# Patient Record
Sex: FEMALE | Race: WHITE | Employment: UNEMPLOYED | URBAN - METROPOLITAN AREA
[De-identification: names, ages, dates, MRNs, and addresses within clinical notes are randomized per-mention and may not be internally consistent; named-entity substitution may affect disease eponyms.]

---

## 2018-09-07 ENCOUNTER — HOSPITAL ENCOUNTER (OUTPATIENT)
Dept: WOMENS IMAGING | Age: 58
Discharge: HOME | End: 2018-09-09
Payer: COMMERCIAL

## 2018-09-07 ENCOUNTER — HOSPITAL ENCOUNTER (OUTPATIENT)
Age: 58
Discharge: HOME | End: 2018-09-07
Payer: COMMERCIAL

## 2018-09-07 DIAGNOSIS — I10 ESSENTIAL HYPERTENSION: ICD-10-CM

## 2018-09-07 DIAGNOSIS — Z12.31 BREAST CANCER SCREENING BY MAMMOGRAM: ICD-10-CM

## 2018-09-07 DIAGNOSIS — Z80.3 FAMILY HISTORY OF BREAST CANCER: ICD-10-CM

## 2018-09-07 LAB
CHOLESTEROL/HDL RATIO: 6.7
CHOLESTEROL: 329 MG/DL
HDLC SERPL-MCNC: 49 MG/DL
LDL CHOLESTEROL: 236 MG/DL (ref 0–130)
TRIGL SERPL-MCNC: 222 MG/DL
VLDLC SERPL CALC-MCNC: ABNORMAL MG/DL (ref 1–30)

## 2018-09-07 PROCEDURE — 80061 LIPID PANEL: CPT

## 2018-09-07 PROCEDURE — 36415 COLL VENOUS BLD VENIPUNCTURE: CPT

## 2018-09-07 PROCEDURE — 77063 BREAST TOMOSYNTHESIS BI: CPT

## 2018-09-14 PROBLEM — Z80.3 FAMILY HISTORY OF BREAST CANCER: Status: ACTIVE | Noted: 2018-09-14

## 2018-09-14 PROBLEM — F41.9 ANXIETY: Status: ACTIVE | Noted: 2018-09-14

## 2018-09-14 PROBLEM — I10 ESSENTIAL HYPERTENSION: Status: ACTIVE | Noted: 2018-09-14

## 2018-09-14 PROBLEM — F33.1 MODERATE EPISODE OF RECURRENT MAJOR DEPRESSIVE DISORDER (HCC): Status: ACTIVE | Noted: 2018-09-14

## 2018-09-14 PROBLEM — E78.2 MIXED HYPERLIPIDEMIA: Status: ACTIVE | Noted: 2018-09-14

## 2018-11-15 ENCOUNTER — TELEPHONE (OUTPATIENT)
Dept: GASTROENTEROLOGY | Age: 58
End: 2018-11-15

## 2019-02-27 PROBLEM — K21.9 GASTROESOPHAGEAL REFLUX DISEASE WITHOUT ESOPHAGITIS: Status: ACTIVE | Noted: 2019-02-27

## 2019-03-27 ENCOUNTER — HOSPITAL ENCOUNTER (OUTPATIENT)
Age: 59
Setting detail: SPECIMEN
Discharge: HOME | End: 2019-03-27
Payer: COMMERCIAL

## 2019-03-27 DIAGNOSIS — F33.1 MODERATE EPISODE OF RECURRENT MAJOR DEPRESSIVE DISORDER (HCC): ICD-10-CM

## 2019-03-27 DIAGNOSIS — K21.9 GASTROESOPHAGEAL REFLUX DISEASE WITHOUT ESOPHAGITIS: ICD-10-CM

## 2019-03-27 DIAGNOSIS — E78.2 MIXED HYPERLIPIDEMIA: ICD-10-CM

## 2019-03-27 DIAGNOSIS — E55.9 VITAMIN D INSUFFICIENCY: ICD-10-CM

## 2019-03-27 DIAGNOSIS — F41.9 ANXIETY: ICD-10-CM

## 2019-03-27 DIAGNOSIS — R13.10 DYSPHAGIA, UNSPECIFIED TYPE: ICD-10-CM

## 2019-03-27 DIAGNOSIS — I10 ESSENTIAL HYPERTENSION: ICD-10-CM

## 2019-03-27 LAB
ABSOLUTE EOS #: 0.23 K/UL (ref 0–0.44)
ABSOLUTE IMMATURE GRANULOCYTE: <0.03 K/UL (ref 0–0.3)
ABSOLUTE LYMPH #: 2.65 K/UL (ref 1.1–3.7)
ABSOLUTE MONO #: 0.45 K/UL (ref 0.1–1.2)
ALBUMIN SERPL-MCNC: 4.7 G/DL (ref 3.5–5.2)
ALBUMIN/GLOBULIN RATIO: 1.8 (ref 1–2.5)
ALP BLD-CCNC: 74 U/L (ref 35–104)
ALT SERPL-CCNC: 22 U/L (ref 5–33)
ANION GAP SERPL CALCULATED.3IONS-SCNC: 16 MMOL/L (ref 9–17)
AST SERPL-CCNC: 14 U/L
BASOPHILS # BLD: 1 % (ref 0–2)
BASOPHILS ABSOLUTE: 0.05 K/UL (ref 0–0.2)
BILIRUB SERPL-MCNC: 0.26 MG/DL (ref 0.3–1.2)
BUN BLDV-MCNC: 22 MG/DL (ref 8–23)
BUN/CREAT BLD: ABNORMAL (ref 9–20)
CALCIUM SERPL-MCNC: 10.3 MG/DL (ref 8.6–10.4)
CHLORIDE BLD-SCNC: 96 MMOL/L (ref 98–107)
CHOLESTEROL/HDL RATIO: 6
CHOLESTEROL: 251 MG/DL
CO2: 23 MMOL/L (ref 20–31)
CREAT SERPL-MCNC: 1.21 MG/DL (ref 0.5–0.9)
DIFFERENTIAL TYPE: NORMAL
EOSINOPHILS RELATIVE PERCENT: 4 % (ref 1–4)
GFR AFRICAN AMERICAN: 55 ML/MIN
GFR NON-AFRICAN AMERICAN: 45 ML/MIN
GFR SERPL CREATININE-BSD FRML MDRD: ABNORMAL ML/MIN/{1.73_M2}
GFR SERPL CREATININE-BSD FRML MDRD: ABNORMAL ML/MIN/{1.73_M2}
GLUCOSE BLD-MCNC: 91 MG/DL (ref 70–99)
HCT VFR BLD CALC: 40.7 % (ref 36.3–47.1)
HDLC SERPL-MCNC: 42 MG/DL
HEMOGLOBIN: 13.3 G/DL (ref 11.9–15.1)
IMMATURE GRANULOCYTES: 0 %
LDL CHOLESTEROL: 131 MG/DL (ref 0–130)
LYMPHOCYTES # BLD: 40 % (ref 24–43)
MCH RBC QN AUTO: 30.5 PG (ref 25.2–33.5)
MCHC RBC AUTO-ENTMCNC: 32.7 G/DL (ref 28.4–34.8)
MCV RBC AUTO: 93.3 FL (ref 82.6–102.9)
MONOCYTES # BLD: 7 % (ref 3–12)
NRBC AUTOMATED: 0 PER 100 WBC
PDW BLD-RTO: 12.6 % (ref 11.8–14.4)
PLATELET # BLD: 374 K/UL (ref 138–453)
PLATELET ESTIMATE: NORMAL
PMV BLD AUTO: 9.9 FL (ref 8.1–13.5)
POTASSIUM SERPL-SCNC: 4.3 MMOL/L (ref 3.7–5.3)
RBC # BLD: 4.36 M/UL (ref 3.95–5.11)
RBC # BLD: NORMAL 10*6/UL
SEG NEUTROPHILS: 48 % (ref 36–65)
SEGMENTED NEUTROPHILS ABSOLUTE COUNT: 3.17 K/UL (ref 1.5–8.1)
SODIUM BLD-SCNC: 135 MMOL/L (ref 135–144)
THYROXINE, FREE: 1.05 NG/DL (ref 0.93–1.7)
TOTAL PROTEIN: 7.3 G/DL (ref 6.4–8.3)
TRIGL SERPL-MCNC: 392 MG/DL
TSH SERPL DL<=0.05 MIU/L-ACNC: 1.23 MIU/L (ref 0.3–5)
VITAMIN D 25-HYDROXY: 23.6 NG/ML (ref 30–100)
VLDLC SERPL CALC-MCNC: ABNORMAL MG/DL (ref 1–30)
WBC # BLD: 6.6 K/UL (ref 3.5–11.3)
WBC # BLD: NORMAL 10*3/UL

## 2019-04-24 ENCOUNTER — TELEPHONE (OUTPATIENT)
Dept: GASTROENTEROLOGY | Age: 59
End: 2019-04-24

## 2019-04-24 NOTE — TELEPHONE ENCOUNTER
Patient LVM to schedule colon/egd she has referrals for colon screening and for dysphagia. Patient was contacted last year to schedule colon but it was not scheduled. Due to the new dx of dysphagia, patient will need an office visit. Please contact patient to schedule consult.

## 2019-04-29 NOTE — TELEPHONE ENCOUNTER
Tried calling pt back to schedule and phone # on file is restricted and will not take our call.  - 1 attempt on this call

## 2019-10-29 ENCOUNTER — CARE COORDINATION (OUTPATIENT)
Dept: CARE COORDINATION | Age: 59
End: 2019-10-29

## 2019-11-08 ENCOUNTER — CARE COORDINATION (OUTPATIENT)
Dept: CARE COORDINATION | Age: 59
End: 2019-11-08

## 2019-11-12 ENCOUNTER — HOSPITAL ENCOUNTER (OUTPATIENT)
Age: 59
Setting detail: SPECIMEN
Discharge: HOME | End: 2019-11-12
Payer: COMMERCIAL

## 2019-11-12 DIAGNOSIS — E55.9 VITAMIN D INSUFFICIENCY: ICD-10-CM

## 2019-11-12 DIAGNOSIS — I10 ESSENTIAL HYPERTENSION: ICD-10-CM

## 2019-11-12 DIAGNOSIS — F33.1 MODERATE EPISODE OF RECURRENT MAJOR DEPRESSIVE DISORDER (HCC): ICD-10-CM

## 2019-11-12 DIAGNOSIS — K21.9 GASTROESOPHAGEAL REFLUX DISEASE WITHOUT ESOPHAGITIS: ICD-10-CM

## 2019-11-12 DIAGNOSIS — E78.2 MIXED HYPERLIPIDEMIA: ICD-10-CM

## 2019-11-12 DIAGNOSIS — Z11.4 ENCOUNTER FOR SCREENING FOR HIV: ICD-10-CM

## 2019-11-12 DIAGNOSIS — Z11.59 ENCOUNTER FOR HEPATITIS C SCREENING TEST FOR LOW RISK PATIENT: ICD-10-CM

## 2019-11-12 DIAGNOSIS — F41.9 ANXIETY: ICD-10-CM

## 2019-11-12 LAB
ABSOLUTE EOS #: 0.22 K/UL (ref 0–0.44)
ABSOLUTE IMMATURE GRANULOCYTE: <0.03 K/UL (ref 0–0.3)
ABSOLUTE LYMPH #: 2.71 K/UL (ref 1.1–3.7)
ABSOLUTE MONO #: 0.52 K/UL (ref 0.1–1.2)
ALBUMIN SERPL-MCNC: 4.3 G/DL (ref 3.5–5.2)
ALBUMIN/GLOBULIN RATIO: 1.7 (ref 1–2.5)
ALP BLD-CCNC: 71 U/L (ref 35–104)
ALT SERPL-CCNC: 16 U/L (ref 5–33)
ANION GAP SERPL CALCULATED.3IONS-SCNC: 15 MMOL/L (ref 9–17)
AST SERPL-CCNC: 12 U/L
BASOPHILS # BLD: 1 % (ref 0–2)
BASOPHILS ABSOLUTE: 0.06 K/UL (ref 0–0.2)
BILIRUB SERPL-MCNC: 0.2 MG/DL (ref 0.3–1.2)
BUN BLDV-MCNC: 18 MG/DL (ref 8–23)
BUN/CREAT BLD: ABNORMAL (ref 9–20)
CALCIUM SERPL-MCNC: 9.8 MG/DL (ref 8.6–10.4)
CHLORIDE BLD-SCNC: 103 MMOL/L (ref 98–107)
CHOLESTEROL/HDL RATIO: 5.5
CHOLESTEROL: 227 MG/DL
CO2: 24 MMOL/L (ref 20–31)
CREAT SERPL-MCNC: 0.89 MG/DL (ref 0.5–0.9)
DIFFERENTIAL TYPE: NORMAL
EOSINOPHILS RELATIVE PERCENT: 3 % (ref 1–4)
GFR AFRICAN AMERICAN: >60 ML/MIN
GFR NON-AFRICAN AMERICAN: >60 ML/MIN
GFR SERPL CREATININE-BSD FRML MDRD: ABNORMAL ML/MIN/{1.73_M2}
GFR SERPL CREATININE-BSD FRML MDRD: ABNORMAL ML/MIN/{1.73_M2}
GLUCOSE BLD-MCNC: 97 MG/DL (ref 70–99)
HCT VFR BLD CALC: 37.1 % (ref 36.3–47.1)
HDLC SERPL-MCNC: 41 MG/DL
HEMOGLOBIN: 12.8 G/DL (ref 11.9–15.1)
HEPATITIS C ANTIBODY: NONREACTIVE
HIV AG/AB: NONREACTIVE
IMMATURE GRANULOCYTES: 0 %
LDL CHOLESTEROL: 107 MG/DL (ref 0–130)
LYMPHOCYTES # BLD: 40 % (ref 24–43)
MCH RBC QN AUTO: 31.1 PG (ref 25.2–33.5)
MCHC RBC AUTO-ENTMCNC: 34.5 G/DL (ref 28.4–34.8)
MCV RBC AUTO: 90.3 FL (ref 82.6–102.9)
MONOCYTES # BLD: 8 % (ref 3–12)
NRBC AUTOMATED: 0 PER 100 WBC
PDW BLD-RTO: 13.4 % (ref 11.8–14.4)
PLATELET # BLD: 364 K/UL (ref 138–453)
PLATELET ESTIMATE: NORMAL
PMV BLD AUTO: 10.1 FL (ref 8.1–13.5)
POTASSIUM SERPL-SCNC: 3.9 MMOL/L (ref 3.7–5.3)
RBC # BLD: 4.11 M/UL (ref 3.95–5.11)
RBC # BLD: NORMAL 10*6/UL
SEG NEUTROPHILS: 48 % (ref 36–65)
SEGMENTED NEUTROPHILS ABSOLUTE COUNT: 3.26 K/UL (ref 1.5–8.1)
SODIUM BLD-SCNC: 142 MMOL/L (ref 135–144)
TOTAL PROTEIN: 6.9 G/DL (ref 6.4–8.3)
TRIGL SERPL-MCNC: 396 MG/DL
VITAMIN D 25-HYDROXY: 24.7 NG/ML (ref 30–100)
VLDLC SERPL CALC-MCNC: ABNORMAL MG/DL (ref 1–30)
WBC # BLD: 6.8 K/UL (ref 3.5–11.3)
WBC # BLD: NORMAL 10*3/UL

## 2019-12-06 ENCOUNTER — HOSPITAL ENCOUNTER (OUTPATIENT)
Dept: WOMENS IMAGING | Age: 59
Discharge: HOME | End: 2019-12-08
Payer: COMMERCIAL

## 2019-12-06 DIAGNOSIS — Z12.31 ENCOUNTER FOR SCREENING MAMMOGRAM FOR BREAST CANCER: ICD-10-CM

## 2019-12-06 PROCEDURE — 77063 BREAST TOMOSYNTHESIS BI: CPT

## 2019-12-11 ENCOUNTER — HOSPITAL ENCOUNTER (OUTPATIENT)
Dept: WOMENS IMAGING | Age: 59
Discharge: HOME | End: 2019-12-13
Payer: COMMERCIAL

## 2019-12-11 DIAGNOSIS — N64.89 BREAST ASYMMETRY IN FEMALE: ICD-10-CM

## 2019-12-11 PROCEDURE — 76642 ULTRASOUND BREAST LIMITED: CPT

## 2019-12-11 PROCEDURE — G0279 TOMOSYNTHESIS, MAMMO: HCPCS

## 2020-06-11 ENCOUNTER — HOSPITAL ENCOUNTER (OUTPATIENT)
Age: 60
Setting detail: SPECIMEN
Discharge: HOME | End: 2020-06-11
Payer: COMMERCIAL

## 2020-06-11 LAB
ABSOLUTE EOS #: 0.22 K/UL (ref 0–0.44)
ABSOLUTE IMMATURE GRANULOCYTE: 0.03 K/UL (ref 0–0.3)
ABSOLUTE LYMPH #: 1.85 K/UL (ref 1.1–3.7)
ABSOLUTE MONO #: 0.42 K/UL (ref 0.1–1.2)
ALBUMIN SERPL-MCNC: 4.6 G/DL (ref 3.5–5.2)
ALBUMIN/GLOBULIN RATIO: 2.1 (ref 1–2.5)
ALP BLD-CCNC: 31 U/L (ref 35–104)
ALT SERPL-CCNC: 49 U/L (ref 5–33)
ANION GAP SERPL CALCULATED.3IONS-SCNC: 16 MMOL/L (ref 9–17)
AST SERPL-CCNC: 37 U/L
BASOPHILS # BLD: 1 % (ref 0–2)
BASOPHILS ABSOLUTE: 0.05 K/UL (ref 0–0.2)
BILIRUB SERPL-MCNC: 0.19 MG/DL (ref 0.3–1.2)
BUN BLDV-MCNC: 24 MG/DL (ref 8–23)
BUN/CREAT BLD: ABNORMAL (ref 9–20)
CALCIUM SERPL-MCNC: 9.7 MG/DL (ref 8.6–10.4)
CHLORIDE BLD-SCNC: 100 MMOL/L (ref 98–107)
CHOLESTEROL/HDL RATIO: 5.1
CHOLESTEROL: 200 MG/DL
CO2: 22 MMOL/L (ref 20–31)
CREAT SERPL-MCNC: 1.23 MG/DL (ref 0.5–0.9)
DIFFERENTIAL TYPE: ABNORMAL
EOSINOPHILS RELATIVE PERCENT: 4 % (ref 1–4)
GFR AFRICAN AMERICAN: 54 ML/MIN
GFR NON-AFRICAN AMERICAN: 44 ML/MIN
GFR SERPL CREATININE-BSD FRML MDRD: ABNORMAL ML/MIN/{1.73_M2}
GFR SERPL CREATININE-BSD FRML MDRD: ABNORMAL ML/MIN/{1.73_M2}
GLUCOSE BLD-MCNC: 105 MG/DL (ref 70–99)
HCT VFR BLD CALC: 37 % (ref 36.3–47.1)
HDLC SERPL-MCNC: 39 MG/DL
HEMOGLOBIN: 11.7 G/DL (ref 11.9–15.1)
IMMATURE GRANULOCYTES: 1 %
LDL CHOLESTEROL: 101 MG/DL (ref 0–130)
LYMPHOCYTES # BLD: 32 % (ref 24–43)
MCH RBC QN AUTO: 30 PG (ref 25.2–33.5)
MCHC RBC AUTO-ENTMCNC: 31.6 G/DL (ref 28.4–34.8)
MCV RBC AUTO: 94.9 FL (ref 82.6–102.9)
MONOCYTES # BLD: 7 % (ref 3–12)
NRBC AUTOMATED: 0 PER 100 WBC
PDW BLD-RTO: 12.8 % (ref 11.8–14.4)
PLATELET # BLD: 389 K/UL (ref 138–453)
PLATELET ESTIMATE: ABNORMAL
PMV BLD AUTO: 10.3 FL (ref 8.1–13.5)
POTASSIUM SERPL-SCNC: 4.5 MMOL/L (ref 3.7–5.3)
RBC # BLD: 3.9 M/UL (ref 3.95–5.11)
RBC # BLD: ABNORMAL 10*6/UL
SEG NEUTROPHILS: 55 % (ref 36–65)
SEGMENTED NEUTROPHILS ABSOLUTE COUNT: 3.29 K/UL (ref 1.5–8.1)
SODIUM BLD-SCNC: 138 MMOL/L (ref 135–144)
TOTAL PROTEIN: 6.8 G/DL (ref 6.4–8.3)
TRIGL SERPL-MCNC: 298 MG/DL
TSH SERPL DL<=0.05 MIU/L-ACNC: 1.19 MIU/L (ref 0.3–5)
VITAMIN D 25-HYDROXY: 21.3 NG/ML (ref 30–100)
VLDLC SERPL CALC-MCNC: ABNORMAL MG/DL (ref 1–30)
WBC # BLD: 5.9 K/UL (ref 3.5–11.3)
WBC # BLD: ABNORMAL 10*3/UL

## 2021-07-15 ENCOUNTER — HOSPITAL ENCOUNTER (OUTPATIENT)
Dept: WOMENS IMAGING | Age: 61
Discharge: HOME | End: 2021-07-17
Payer: COMMERCIAL

## 2021-07-15 DIAGNOSIS — Z12.31 ENCOUNTER FOR SCREENING MAMMOGRAM FOR BREAST CANCER: ICD-10-CM

## 2021-07-15 PROCEDURE — 77063 BREAST TOMOSYNTHESIS BI: CPT

## 2021-07-21 ENCOUNTER — HOSPITAL ENCOUNTER (OUTPATIENT)
Age: 61
Setting detail: SPECIMEN
Discharge: HOME | End: 2021-07-21
Payer: COMMERCIAL

## 2021-07-21 DIAGNOSIS — E55.9 VITAMIN D INSUFFICIENCY: ICD-10-CM

## 2021-07-21 DIAGNOSIS — R73.01 ELEVATED FASTING GLUCOSE: ICD-10-CM

## 2021-07-21 DIAGNOSIS — F41.9 ANXIETY: ICD-10-CM

## 2021-07-21 DIAGNOSIS — I10 ESSENTIAL HYPERTENSION: ICD-10-CM

## 2021-07-21 DIAGNOSIS — K21.9 GASTROESOPHAGEAL REFLUX DISEASE WITHOUT ESOPHAGITIS: ICD-10-CM

## 2021-07-21 DIAGNOSIS — E78.2 MIXED HYPERLIPIDEMIA: ICD-10-CM

## 2021-07-21 DIAGNOSIS — F33.1 MODERATE EPISODE OF RECURRENT MAJOR DEPRESSIVE DISORDER (HCC): ICD-10-CM

## 2021-07-21 DIAGNOSIS — Z13.1 ENCOUNTER FOR SCREENING FOR DIABETES MELLITUS: ICD-10-CM

## 2021-07-21 DIAGNOSIS — F17.200 NICOTINE DEPENDENCE WITH CURRENT USE: ICD-10-CM

## 2021-07-21 LAB
ABSOLUTE EOS #: 0.19 K/UL (ref 0–0.44)
ABSOLUTE IMMATURE GRANULOCYTE: 0.03 K/UL (ref 0–0.3)
ABSOLUTE LYMPH #: 2.15 K/UL (ref 1.1–3.7)
ABSOLUTE MONO #: 0.55 K/UL (ref 0.1–1.2)
ALBUMIN SERPL-MCNC: 4.9 G/DL (ref 3.5–5.2)
ALBUMIN/GLOBULIN RATIO: 1.8 (ref 1–2.5)
ALP BLD-CCNC: 33 U/L (ref 35–104)
ALT SERPL-CCNC: 30 U/L (ref 5–33)
ANION GAP SERPL CALCULATED.3IONS-SCNC: 19 MMOL/L (ref 9–17)
AST SERPL-CCNC: 26 U/L
BASOPHILS # BLD: 1 % (ref 0–2)
BASOPHILS ABSOLUTE: 0.05 K/UL (ref 0–0.2)
BILIRUB SERPL-MCNC: 0.28 MG/DL (ref 0.3–1.2)
BUN BLDV-MCNC: 27 MG/DL (ref 8–23)
BUN/CREAT BLD: ABNORMAL (ref 9–20)
CALCIUM SERPL-MCNC: 10.6 MG/DL (ref 8.6–10.4)
CHLORIDE BLD-SCNC: 104 MMOL/L (ref 98–107)
CHOLESTEROL, FASTING: 190 MG/DL
CHOLESTEROL/HDL RATIO: 3.9
CO2: 20 MMOL/L (ref 20–31)
CREAT SERPL-MCNC: 1.3 MG/DL (ref 0.5–0.9)
DIFFERENTIAL TYPE: NORMAL
EOSINOPHILS RELATIVE PERCENT: 3 % (ref 1–4)
GFR AFRICAN AMERICAN: 50 ML/MIN
GFR NON-AFRICAN AMERICAN: 42 ML/MIN
GFR SERPL CREATININE-BSD FRML MDRD: ABNORMAL ML/MIN/{1.73_M2}
GFR SERPL CREATININE-BSD FRML MDRD: ABNORMAL ML/MIN/{1.73_M2}
GLUCOSE BLD-MCNC: 90 MG/DL (ref 70–99)
HCT VFR BLD CALC: 41.3 % (ref 36.3–47.1)
HDLC SERPL-MCNC: 49 MG/DL
HEMOGLOBIN: 12.8 G/DL (ref 11.9–15.1)
IMMATURE GRANULOCYTES: 0 %
LDL CHOLESTEROL: 97 MG/DL (ref 0–130)
LYMPHOCYTES # BLD: 30 % (ref 24–43)
MCH RBC QN AUTO: 29.6 PG (ref 25.2–33.5)
MCHC RBC AUTO-ENTMCNC: 31 G/DL (ref 28.4–34.8)
MCV RBC AUTO: 95.6 FL (ref 82.6–102.9)
MONOCYTES # BLD: 8 % (ref 3–12)
NRBC AUTOMATED: 0 PER 100 WBC
PDW BLD-RTO: 13.3 % (ref 11.8–14.4)
PLATELET # BLD: 438 K/UL (ref 138–453)
PLATELET ESTIMATE: NORMAL
PMV BLD AUTO: 10.4 FL (ref 8.1–13.5)
POTASSIUM SERPL-SCNC: 4.5 MMOL/L (ref 3.7–5.3)
RBC # BLD: 4.32 M/UL (ref 3.95–5.11)
RBC # BLD: NORMAL 10*6/UL
SEG NEUTROPHILS: 58 % (ref 36–65)
SEGMENTED NEUTROPHILS ABSOLUTE COUNT: 4.14 K/UL (ref 1.5–8.1)
SODIUM BLD-SCNC: 143 MMOL/L (ref 135–144)
TOTAL PROTEIN: 7.6 G/DL (ref 6.4–8.3)
TRIGLYCERIDE, FASTING: 219 MG/DL
VLDLC SERPL CALC-MCNC: ABNORMAL MG/DL (ref 1–30)
WBC # BLD: 7.1 K/UL (ref 3.5–11.3)
WBC # BLD: NORMAL 10*3/UL

## 2021-07-22 LAB
ESTIMATED AVERAGE GLUCOSE: 128 MG/DL
HBA1C MFR BLD: 6.1 % (ref 4–6)
VITAMIN D 25-HYDROXY: 36.9 NG/ML (ref 30–100)

## 2021-07-28 ENCOUNTER — HOSPITAL ENCOUNTER (OUTPATIENT)
Dept: ULTRASOUND IMAGING | Age: 61
Discharge: HOME | End: 2021-07-30
Payer: COMMERCIAL

## 2021-07-28 DIAGNOSIS — R79.9 ELEVATED BUN: ICD-10-CM

## 2021-07-28 DIAGNOSIS — R79.89 ELEVATED SERUM CREATININE: ICD-10-CM

## 2021-07-28 DIAGNOSIS — R94.4 DECREASED GFR: ICD-10-CM

## 2021-07-28 PROCEDURE — 76770 US EXAM ABDO BACK WALL COMP: CPT

## 2021-09-07 ENCOUNTER — HOSPITAL ENCOUNTER (OUTPATIENT)
Age: 61
Setting detail: SPECIMEN
Discharge: HOME | End: 2021-09-07
Payer: COMMERCIAL

## 2021-09-07 DIAGNOSIS — I12.9 BENIGN HYPERTENSION WITH CKD (CHRONIC KIDNEY DISEASE) STAGE III (HCC): ICD-10-CM

## 2021-09-07 DIAGNOSIS — N18.32 STAGE 3B CHRONIC KIDNEY DISEASE (HCC): ICD-10-CM

## 2021-09-07 DIAGNOSIS — N18.30 BENIGN HYPERTENSION WITH CKD (CHRONIC KIDNEY DISEASE) STAGE III (HCC): ICD-10-CM

## 2021-09-07 DIAGNOSIS — N18.32 ANEMIA IN STAGE 3B CHRONIC KIDNEY DISEASE (HCC): ICD-10-CM

## 2021-09-07 DIAGNOSIS — E83.52 HYPERCALCEMIA: ICD-10-CM

## 2021-09-07 DIAGNOSIS — D63.1 ANEMIA IN STAGE 3B CHRONIC KIDNEY DISEASE (HCC): ICD-10-CM

## 2021-09-07 LAB
-: NORMAL
ABSOLUTE EOS #: 0.25 K/UL (ref 0–0.44)
ABSOLUTE IMMATURE GRANULOCYTE: 0.03 K/UL (ref 0–0.3)
ABSOLUTE LYMPH #: 2.26 K/UL (ref 1.1–3.7)
ABSOLUTE MONO #: 0.56 K/UL (ref 0.1–1.2)
AMORPHOUS: NORMAL
ANION GAP SERPL CALCULATED.3IONS-SCNC: 16 MMOL/L (ref 9–17)
BACTERIA: NORMAL
BASOPHILS # BLD: 1 % (ref 0–2)
BASOPHILS ABSOLUTE: 0.06 K/UL (ref 0–0.2)
BILIRUBIN URINE: NEGATIVE
BUN BLDV-MCNC: 32 MG/DL (ref 8–23)
CALCIUM SERPL-MCNC: 10.4 MG/DL (ref 8.6–10.4)
CASTS UA: NORMAL /LPF (ref 0–8)
CHLORIDE BLD-SCNC: 103 MMOL/L (ref 98–107)
CO2: 20 MMOL/L (ref 20–31)
COLOR: YELLOW
CREAT SERPL-MCNC: 1.25 MG/DL (ref 0.5–0.9)
CREATININE URINE: 46.5 MG/DL (ref 28–217)
CRYSTALS, UA: NORMAL /HPF
DIFFERENTIAL TYPE: NORMAL
EOSINOPHILS RELATIVE PERCENT: 4 % (ref 1–4)
EPITHELIAL CELLS UA: NORMAL /HPF (ref 0–5)
GFR AFRICAN AMERICAN: 53 ML/MIN
GFR NON-AFRICAN AMERICAN: 43 ML/MIN
GFR SERPL CREATININE-BSD FRML MDRD: ABNORMAL ML/MIN/{1.73_M2}
GFR SERPL CREATININE-BSD FRML MDRD: ABNORMAL ML/MIN/{1.73_M2}
GLUCOSE URINE: NEGATIVE
HCT VFR BLD CALC: 38.4 % (ref 36.3–47.1)
HEMOGLOBIN: 12.3 G/DL (ref 11.9–15.1)
IMMATURE GRANULOCYTES: 0 %
KETONES, URINE: NEGATIVE
LEUKOCYTE ESTERASE, URINE: NEGATIVE
LYMPHOCYTES # BLD: 31 % (ref 24–43)
MAGNESIUM: 2 MG/DL (ref 1.6–2.6)
MCH RBC QN AUTO: 29.1 PG (ref 25.2–33.5)
MCHC RBC AUTO-ENTMCNC: 32 G/DL (ref 28.4–34.8)
MCV RBC AUTO: 91 FL (ref 82.6–102.9)
MICROALBUMIN/CREAT 24H UR: <12 MG/L
MICROALBUMIN/CREAT UR-RTO: NORMAL MCG/MG CREAT
MONOCYTES # BLD: 8 % (ref 3–12)
MUCUS: NORMAL
NITRITE, URINE: NEGATIVE
NRBC AUTOMATED: 0 PER 100 WBC
OTHER OBSERVATIONS UA: NORMAL
PDW BLD-RTO: 13 % (ref 11.8–14.4)
PH UA: 6.5 (ref 5–8)
PHOSPHORUS: 3.2 MG/DL (ref 2.6–4.5)
PLATELET # BLD: 445 K/UL (ref 138–453)
PLATELET ESTIMATE: NORMAL
PMV BLD AUTO: 10.4 FL (ref 8.1–13.5)
POTASSIUM SERPL-SCNC: 4.2 MMOL/L (ref 3.7–5.3)
PROTEIN UA: NEGATIVE
RBC # BLD: 4.22 M/UL (ref 3.95–5.11)
RBC # BLD: NORMAL 10*6/UL
RBC UA: NORMAL /HPF (ref 0–4)
RENAL EPITHELIAL, UA: NORMAL /HPF
SEG NEUTROPHILS: 56 % (ref 36–65)
SEGMENTED NEUTROPHILS ABSOLUTE COUNT: 4.03 K/UL (ref 1.5–8.1)
SODIUM BLD-SCNC: 139 MMOL/L (ref 135–144)
SPECIFIC GRAVITY UA: 1.01 (ref 1–1.03)
TRICHOMONAS: NORMAL
TURBIDITY: CLEAR
URINE HGB: NEGATIVE
UROBILINOGEN, URINE: NORMAL
VITAMIN D 25-HYDROXY: 33.7 NG/ML (ref 30–100)
WBC # BLD: 7.2 K/UL (ref 3.5–11.3)
WBC # BLD: NORMAL 10*3/UL
WBC UA: NORMAL /HPF (ref 0–5)
YEAST: NORMAL

## 2021-10-25 ENCOUNTER — HOSPITAL ENCOUNTER (OUTPATIENT)
Age: 61
Setting detail: SPECIMEN
Discharge: HOME | End: 2021-10-25
Payer: COMMERCIAL

## 2021-10-25 DIAGNOSIS — I12.9 BENIGN HYPERTENSION WITH CKD (CHRONIC KIDNEY DISEASE) STAGE III (HCC): ICD-10-CM

## 2021-10-25 DIAGNOSIS — N18.32 STAGE 3B CHRONIC KIDNEY DISEASE (HCC): ICD-10-CM

## 2021-10-25 DIAGNOSIS — N18.30 BENIGN HYPERTENSION WITH CKD (CHRONIC KIDNEY DISEASE) STAGE III (HCC): ICD-10-CM

## 2021-10-25 LAB
CALCIUM TIMED UR: ABNORMAL MG/X H
CALCIUM URINE: 16.3 MG/DL
CALCIUM, URINE: 345 MG/24 H (ref 20–275)
CREAT SERPL-MCNC: 1.38 MG/DL (ref 0.5–0.9)
CREATININE CLEARANCE: 55.2 ML/MIN/BSA (ref 71–151)
CREATININE URINE: 54.7 MG/DL (ref 28–217)
HOURS COLLECTED: 24 H
LENGTH OF COLLECTION: 24 H
PATIENT HEIGHT: 163 CM
PATIENT WEIGHT: 77 KG
PROTEIN 24 HOUR URINE: <85 MG/24 H
PROTEIN,TOT TIMED UR: NORMAL MG/X H
SODIUM 24 HOUR URINE: 140 MMOL/24 H (ref 40–220)
SODIUM TIMED UR: NORMAL MMOL/X H
SODIUM URINE: 66 MMOL/L
URINE TOTAL PROTEIN: <4 MG/DL
VOLUME: 2117 ML

## 2021-11-05 PROBLEM — M65.30 ACQUIRED TRIGGER FINGER: Status: ACTIVE | Noted: 2021-08-19

## 2022-07-21 ENCOUNTER — HOSPITAL ENCOUNTER (OUTPATIENT)
Age: 62
Setting detail: SPECIMEN
Discharge: HOME | End: 2022-07-21

## 2022-07-21 ENCOUNTER — OFFICE VISIT (OUTPATIENT)
Dept: GASTROENTEROLOGY | Age: 62
End: 2022-07-21
Payer: COMMERCIAL

## 2022-07-21 VITALS
BODY MASS INDEX: 29.91 KG/M2 | HEIGHT: 64 IN | HEART RATE: 76 BPM | DIASTOLIC BLOOD PRESSURE: 82 MMHG | SYSTOLIC BLOOD PRESSURE: 138 MMHG | WEIGHT: 175.2 LBS

## 2022-07-21 DIAGNOSIS — K21.9 GASTROESOPHAGEAL REFLUX DISEASE, UNSPECIFIED WHETHER ESOPHAGITIS PRESENT: ICD-10-CM

## 2022-07-21 DIAGNOSIS — K21.9 GASTROESOPHAGEAL REFLUX DISEASE, UNSPECIFIED WHETHER ESOPHAGITIS PRESENT: Primary | ICD-10-CM

## 2022-07-21 LAB
ABSOLUTE EOS #: 0.12 K/UL (ref 0–0.44)
ABSOLUTE IMMATURE GRANULOCYTE: 0.03 K/UL (ref 0–0.3)
ABSOLUTE LYMPH #: 2.66 K/UL (ref 1.1–3.7)
ABSOLUTE MONO #: 0.73 K/UL (ref 0.1–1.2)
ALBUMIN SERPL-MCNC: 5.1 G/DL (ref 3.5–5.2)
ALBUMIN/GLOBULIN RATIO: 2.1 (ref 1–2.5)
ALP BLD-CCNC: 42 U/L (ref 35–104)
ALT SERPL-CCNC: 31 U/L (ref 5–33)
AST SERPL-CCNC: 34 U/L
BASOPHILS # BLD: 1 % (ref 0–2)
BASOPHILS ABSOLUTE: 0.09 K/UL (ref 0–0.2)
BILIRUB SERPL-MCNC: 0.25 MG/DL (ref 0.3–1.2)
BILIRUBIN DIRECT: <0.08 MG/DL
BILIRUBIN, INDIRECT: ABNORMAL MG/DL (ref 0–1)
EOSINOPHILS RELATIVE PERCENT: 1 % (ref 1–4)
HCT VFR BLD CALC: 42.5 % (ref 36.3–47.1)
HEMOGLOBIN: 13.7 G/DL (ref 11.9–15.1)
IMMATURE GRANULOCYTES: 0 %
LYMPHOCYTES # BLD: 30 % (ref 24–43)
MCH RBC QN AUTO: 29.5 PG (ref 25.2–33.5)
MCHC RBC AUTO-ENTMCNC: 32.2 G/DL (ref 28.4–34.8)
MCV RBC AUTO: 91.6 FL (ref 82.6–102.9)
MONOCYTES # BLD: 8 % (ref 3–12)
NRBC AUTOMATED: 0 PER 100 WBC
PDW BLD-RTO: 12.8 % (ref 11.8–14.4)
PLATELET # BLD: 430 K/UL (ref 138–453)
PMV BLD AUTO: 10.4 FL (ref 8.1–13.5)
RBC # BLD: 4.64 M/UL (ref 3.95–5.11)
SEG NEUTROPHILS: 60 % (ref 36–65)
SEGMENTED NEUTROPHILS ABSOLUTE COUNT: 5.31 K/UL (ref 1.5–8.1)
TOTAL PROTEIN: 7.5 G/DL (ref 6.4–8.3)
WBC # BLD: 8.9 K/UL (ref 3.5–11.3)

## 2022-07-21 PROCEDURE — 3017F COLORECTAL CA SCREEN DOC REV: CPT | Performed by: INTERNAL MEDICINE

## 2022-07-21 PROCEDURE — 99203 OFFICE O/P NEW LOW 30 MIN: CPT | Performed by: INTERNAL MEDICINE

## 2022-07-21 PROCEDURE — G8417 CALC BMI ABV UP PARAM F/U: HCPCS | Performed by: INTERNAL MEDICINE

## 2022-07-21 PROCEDURE — 1036F TOBACCO NON-USER: CPT | Performed by: INTERNAL MEDICINE

## 2022-07-21 PROCEDURE — G8427 DOCREV CUR MEDS BY ELIG CLIN: HCPCS | Performed by: INTERNAL MEDICINE

## 2022-07-21 ASSESSMENT — ENCOUNTER SYMPTOMS
SHORTNESS OF BREATH: 0
ABDOMINAL DISTENTION: 0
TROUBLE SWALLOWING: 1
VOICE CHANGE: 0
CONSTIPATION: 0
RECTAL PAIN: 0
VOMITING: 1
BLOOD IN STOOL: 0
DIARRHEA: 1
NAUSEA: 1
ABDOMINAL PAIN: 0
COUGH: 1
ANAL BLEEDING: 0
WHEEZING: 0
CHOKING: 1

## 2022-07-21 NOTE — PROGRESS NOTES
Reason for Referral: GERD      Sam Logan PA-C  [address] [facility]  [address] [facility],[address]  [place],  [address] [place] [address]    Chief Complaint   Patient presents with    New Patient    Gastroesophageal Reflux     Burning in chest, chest pressure, back pain. Everything she eats causes heartburn. HISTORY OF PRESENT ILLNESS: Ms.Cynthia Kitty De La Fuente is a 60 y.o. female with a past history remarkable for active smoker approximately 2 to 4 cigarettes/day x 30 years, chronic GERD symptoms, unresponsive to PPI therapy, currently on Pepcid twice daily which has been quite effective for the patient, referred for evaluation of longstanding acid reflux symptoms. Patient reports prior upper endoscopy performed at [facility] in [address] [place] [address]. Negative for any obvious structural causes. He denies any postprandial dyspepsia. Denies any dysphagia symptoms. No obvious alarming symptoms aside from longstanding acid reflux. Patient reports approximately 3 more symptoms per week of acid reflux heartburn and a mild associated postprandial dyspepsia. Denies any NSAID use. Smoker: active smoker 2-4 cig/day, x 30 yrs. Drinking history: none   Illicit drugs: Cannibus, edibles, smoke,   Abdominal surgeries: Appendectomy, hysterectomy. Prior Colonoscopy: Cologuard-- negative    Prior EGD: [place] [address]   FH of GI issues: None       Past Medical,Family, and Social History reviewed and does contribute to the patient presentingcondition. Patient's PMH/PSH,SH,PSYCH Hx, MEDs, ALLERGIES, and ROS were all reviewed and updated in the appropriate sections.     PAST MEDICAL HISTORY:  Past Medical History:   Diagnosis Date    Anxiety 9/14/2018    Gastroesophageal reflux disease without esophagitis 2/27/2019    Hyperlipidemia     Hypertension     Moderate episode of recurrent major depressive disorder ([facility] [address] 9/14/2018       Past Surgical History:   Procedure Laterality Date    APPENDECTOMY      TOTAL ABDOMINAL HYSTERECTOMY         CURRENT MEDICATIONS:    Current Outpatient Medications:     Multiple Vitamins-Minerals (THERAPEUTIC MULTIVITAMIN-MINERALS) tablet, Take 1 tablet by mouth daily women's 50+ advanced, Disp: , Rfl:     metoprolol succinate (TOPROL XL) 100 MG extended release tablet, take 1 tablet by mouth once daily, Disp: 90 tablet, Rfl: 2    hydroCHLOROthiazide (HYDRODIURIL) 50 MG tablet, Take 1 tablet by mouth every morning, Disp: 90 tablet, Rfl: 2    hydrALAZINE (APRESOLINE) 25 MG tablet, Take one pill TID, Disp: 270 tablet, Rfl: 2    fenofibrate (TRIGLIDE) 160 MG tablet, take 1 tablet by mouth once daily, Disp: 90 tablet, Rfl: 2    buPROPion (WELLBUTRIN SR) 150 MG extended release tablet, take 1 tablet by mouth twice a day, Disp: 180 tablet, Rfl: 2    atorvastatin (LIPITOR) 40 MG tablet, take 1 tablet by mouth at bedtime, Disp: 90 tablet, Rfl: 2    triamcinolone (KENALOG) 0.1 % ointment, triamcinolone acetonide 0.1 % topical ointment  apply to affected area twice a day, Disp: , Rfl:     aspirin 81 MG chewable tablet, aspirin 81 mg chewable tablet  Chew 1 tablet every day by oral route., Disp: , Rfl:     Cholecalciferol (VITAMIN D) 50 MCG (2000 UT) CAPS capsule, Take by mouth, Disp: , Rfl:     famotidine (PEPCID) 20 MG tablet, take 1 tablet by mouth twice a day, Disp: 60 tablet, Rfl: 2    ALLERGIES:   No Known Allergies    FAMILY HISTORY:       Problem Relation Age of Onset    Cancer Mother     Breast Cancer Mother     Diabetes Father     High Blood Pressure Father     High Cholesterol Father          SOCIAL HISTORY:   Social History     Socioeconomic History    Marital status:      Spouse name: Not on file    Number of children: Not on file    Years of education: Not on file    Highest education level: Not on file   Occupational History    Not on file   Tobacco Use    Smoking status: Former     Packs/day: 0.50     Years: 18.00     Pack years: 9.00     Types: Cigarettes     Quit date: 2020     Years since quittin.5 Genitourinary:  Negative for difficulty urinating. Neurological:  Positive for dizziness (when food is stuck in throat). Negative for seizures, weakness, numbness and headaches. Hematological:  Does not bruise/bleed easily. Psychiatric/Behavioral:  Negative for confusion and sleep disturbance. The patient is not nervous/anxious. PHYSICAL EXAMINATION: Vital signs reviewed per the nursing documentation. /82   Pulse 76   Ht 5' 4\" (1.626 m)   Wt 175 lb 3.2 oz (79.5 kg)   BMI 30.07 kg/m²   Body mass index is 30.07 kg/m². Physical Exam    Physical Exam   Constitutional: Patient is oriented to person, place, and time. Patient appears well-developed and well-nourished. HENT:   Head: Normocephalic and atraumatic. Eyes: Pupils are equal, round, and reactive to light. EOM are normal.   Neck: Normal range of motion. Neck supple. No JVD present. No tracheal deviation present. No thyromegaly present. Cardiovascular: Normal rate, regular rhythm, normal heart sounds and intact distal pulses. Pulmonary/Chest: Effort normal and breath sounds normal. No stridor. No respiratory distress. He has no wheezes. He has no rales. He exhibits no tenderness. Abdominal: Soft. Bowel sounds are normal. He exhibits no distension and no mass. There is no tenderness. There is no rebound and no guarding. No hernia. Musculoskeletal: Normal range of motion. Lymphadenopathy:    Patient has no cervical adenopathy. Neurological: Patient is alert and oriented to person, place, and time. Psychiatric: Patient has a normal mood and affect.  Patient behavior is normal.       LABORATORY DATA: Reviewed  Lab Results   Component Value Date    WBC 8.9 07/21/2022    HGB 13.7 07/21/2022    HCT 42.5 07/21/2022    MCV 91.6 07/21/2022     07/21/2022     09/07/2021    K 4.2 09/07/2021     09/07/2021    CO2 20 09/07/2021    BUN 32 (H) 09/07/2021    CREATININE 1.38 (H) 10/25/2021    LABALBU 5.1 07/21/2022 BILITOT 0.25 (L) 07/21/2022    ALKPHOS 42 07/21/2022    AST 34 (H) 07/21/2022    ALT 31 07/21/2022         Lab Results   Component Value Date    RBC 4.64 07/21/2022    HGB 13.7 07/21/2022    MCV 91.6 07/21/2022    MCH 29.5 07/21/2022    MCHC 32.2 07/21/2022    RDW 12.8 07/21/2022    MPV 10.4 07/21/2022    BASOPCT 1 07/21/2022    LYMPHSABS 2.66 07/21/2022    MONOSABS 0.73 07/21/2022    NEUTROABS 5.31 07/21/2022    EOSABS 0.12 07/21/2022    BASOSABS 0.09 07/21/2022         DIAGNOSTIC TESTING:     No results found. IMPRESSION: Ms.Cynthia Aniat Marx is a 60 y.o. female with a past history remarkable for active smoker approximately 2 to 4 cigarettes/day x 30 years, chronic GERD symptoms, unresponsive to PPI therapy, currently on Pepcid twice daily which has been quite effective for the patient, referred for evaluation of longstanding acid reflux symptoms. Patient reports prior upper endoscopy performed at [facility] in [address] [place] [address]. Negative for any obvious structural causes. He denies any postprandial dyspepsia. Denies any dysphagia symptoms. No obvious alarming symptoms aside from longstanding acid reflux. Patient reports approximately 3 more symptoms per week of acid reflux heartburn and a mild associated postprandial dyspepsia. Denies any NSAID use. Assessment  1. Gastroesophageal reflux disease, unspecified whether esophagitis present        [facility] was [facility] today for new patient and gastroesophageal reflux. Diagnoses and all orders for this visit:    Gastroesophageal reflux disease, unspecified whether esophagitis present-typical symptoms, frequent symptoms and only responsive to Pepcid complete over-the-counter twice daily. Patient continue with this for the time being. May consider alternative regimens in the future. Patient was unresponsive to PPI therapy in the past, has tried Prevacid, Nexium, Protonix and Prilosec without significant relief. Strongly by smoking cessation.   Dietary modifications recommended. We will plan for diagnostic upper endoscopy to further evaluate for any possible structural causes of the patient's longstanding acid reflux.  -     Hepatic Function Panel; Future  -     CBC with Auto Differential; Future  -     EGD; Future           RTC: 3 months. Additional comments: Thank you for allowing me to participate in the care of Ms. Cantua. For any further questions please do not hesitate to contact me. I have reviewed and agree with the MA/LPN ROS please refer to their documentation from today's encounter on a separate note. Kassandra Canchola MD, MPH   Board Certified in Gastroenterology  Board Certified in [address] [place] [address] #: [phone]          this note is created with the assistance of a speech recognition program.  While intending to generate a document that actually reflects the content of the visit, the document can still have some errors including those of syntax and sound a like substitutions which may escape proof reading. It such instances, actual meaning can be extrapolated by contextual diversion.

## 2022-08-01 ENCOUNTER — HOSPITAL ENCOUNTER (OUTPATIENT)
Dept: WOMENS IMAGING | Age: 62
Discharge: HOME | End: 2022-08-03
Payer: COMMERCIAL

## 2022-08-01 DIAGNOSIS — Z12.31 ENCOUNTER FOR SCREENING MAMMOGRAM FOR BREAST CANCER: ICD-10-CM

## 2022-08-01 PROCEDURE — 77063 BREAST TOMOSYNTHESIS BI: CPT

## 2022-08-08 ENCOUNTER — ANESTHESIA EVENT (OUTPATIENT)
Dept: OPERATING ROOM | Age: 62
End: 2022-08-08
Payer: COMMERCIAL

## 2022-08-08 ENCOUNTER — HOSPITAL ENCOUNTER (OUTPATIENT)
Age: 62
Setting detail: SPECIMEN
Discharge: HOME | End: 2022-08-08

## 2022-08-08 DIAGNOSIS — N18.30 BENIGN HYPERTENSION WITH CKD (CHRONIC KIDNEY DISEASE) STAGE III (HCC): ICD-10-CM

## 2022-08-08 DIAGNOSIS — F33.1 MODERATE EPISODE OF RECURRENT MAJOR DEPRESSIVE DISORDER (HCC): ICD-10-CM

## 2022-08-08 DIAGNOSIS — N18.31 STAGE 3A CHRONIC KIDNEY DISEASE (HCC): ICD-10-CM

## 2022-08-08 DIAGNOSIS — E78.2 MIXED HYPERLIPIDEMIA: ICD-10-CM

## 2022-08-08 DIAGNOSIS — E55.9 VITAMIN D INSUFFICIENCY: ICD-10-CM

## 2022-08-08 DIAGNOSIS — I10 ESSENTIAL HYPERTENSION: ICD-10-CM

## 2022-08-08 DIAGNOSIS — F17.200 NICOTINE DEPENDENCE WITH CURRENT USE: ICD-10-CM

## 2022-08-08 DIAGNOSIS — K21.9 GASTROESOPHAGEAL REFLUX DISEASE WITHOUT ESOPHAGITIS: ICD-10-CM

## 2022-08-08 DIAGNOSIS — I12.9 BENIGN HYPERTENSION WITH CKD (CHRONIC KIDNEY DISEASE) STAGE III (HCC): ICD-10-CM

## 2022-08-08 DIAGNOSIS — F41.9 ANXIETY: ICD-10-CM

## 2022-08-08 DIAGNOSIS — R73.03 PRE-DIABETES: ICD-10-CM

## 2022-08-08 LAB
ABSOLUTE EOS #: 0.17 K/UL (ref 0–0.44)
ABSOLUTE IMMATURE GRANULOCYTE: 0.03 K/UL (ref 0–0.3)
ABSOLUTE LYMPH #: 2.2 K/UL (ref 1.1–3.7)
ABSOLUTE MONO #: 0.56 K/UL (ref 0.1–1.2)
ALBUMIN SERPL-MCNC: 4.9 G/DL (ref 3.5–5.2)
ALBUMIN/GLOBULIN RATIO: 2.1 (ref 1–2.5)
ALP BLD-CCNC: 39 U/L (ref 35–104)
ALT SERPL-CCNC: 38 U/L (ref 5–33)
ANION GAP SERPL CALCULATED.3IONS-SCNC: 16 MMOL/L (ref 9–17)
AST SERPL-CCNC: 27 U/L
BASOPHILS # BLD: 1 % (ref 0–2)
BASOPHILS ABSOLUTE: 0.06 K/UL (ref 0–0.2)
BILIRUB SERPL-MCNC: 0.32 MG/DL (ref 0.3–1.2)
BUN BLDV-MCNC: 28 MG/DL (ref 8–23)
CALCIUM SERPL-MCNC: 10.5 MG/DL (ref 8.6–10.4)
CHLORIDE BLD-SCNC: 102 MMOL/L (ref 98–107)
CHOLESTEROL/HDL RATIO: 4
CHOLESTEROL: 152 MG/DL
CO2: 25 MMOL/L (ref 20–31)
CREAT SERPL-MCNC: 1.27 MG/DL (ref 0.5–0.9)
EOSINOPHILS RELATIVE PERCENT: 2 % (ref 1–4)
ESTIMATED AVERAGE GLUCOSE: 128 MG/DL
GFR AFRICAN AMERICAN: 52 ML/MIN
GFR NON-AFRICAN AMERICAN: 43 ML/MIN
GFR SERPL CREATININE-BSD FRML MDRD: ABNORMAL ML/MIN/{1.73_M2}
GLUCOSE BLD-MCNC: 112 MG/DL (ref 70–99)
HBA1C MFR BLD: 6.1 % (ref 4–6)
HCT VFR BLD CALC: 41.1 % (ref 36.3–47.1)
HDLC SERPL-MCNC: 38 MG/DL
HEMOGLOBIN: 14 G/DL (ref 11.9–15.1)
IMMATURE GRANULOCYTES: 0 %
LDL CHOLESTEROL: 66 MG/DL (ref 0–130)
LYMPHOCYTES # BLD: 31 % (ref 24–43)
MCH RBC QN AUTO: 30.9 PG (ref 25.2–33.5)
MCHC RBC AUTO-ENTMCNC: 34.1 G/DL (ref 28.4–34.8)
MCV RBC AUTO: 90.7 FL (ref 82.6–102.9)
MONOCYTES # BLD: 8 % (ref 3–12)
NRBC AUTOMATED: 0 PER 100 WBC
PDW BLD-RTO: 12.6 % (ref 11.8–14.4)
PLATELET # BLD: 424 K/UL (ref 138–453)
PMV BLD AUTO: 10.5 FL (ref 8.1–13.5)
POTASSIUM SERPL-SCNC: 4.2 MMOL/L (ref 3.7–5.3)
RBC # BLD: 4.53 M/UL (ref 3.95–5.11)
SEG NEUTROPHILS: 58 % (ref 36–65)
SEGMENTED NEUTROPHILS ABSOLUTE COUNT: 4.18 K/UL (ref 1.5–8.1)
SODIUM BLD-SCNC: 143 MMOL/L (ref 135–144)
TOTAL PROTEIN: 7.2 G/DL (ref 6.4–8.3)
TRIGL SERPL-MCNC: 240 MG/DL
VITAMIN D 25-HYDROXY: 41.7 NG/ML
WBC # BLD: 7.2 K/UL (ref 3.5–11.3)

## 2022-08-08 RX ORDER — SODIUM CHLORIDE 0.9 % (FLUSH) 0.9 %
5-40 SYRINGE (ML) INJECTION EVERY 12 HOURS SCHEDULED
Status: CANCELLED | OUTPATIENT
Start: 2022-08-08

## 2022-08-08 RX ORDER — SODIUM CHLORIDE 9 MG/ML
INJECTION, SOLUTION INTRAVENOUS PRN
Status: CANCELLED | OUTPATIENT
Start: 2022-08-08

## 2022-08-08 RX ORDER — SODIUM CHLORIDE 0.9 % (FLUSH) 0.9 %
5-40 SYRINGE (ML) INJECTION PRN
Status: CANCELLED | OUTPATIENT
Start: 2022-08-08

## 2022-08-08 RX ORDER — SODIUM CHLORIDE, SODIUM LACTATE, POTASSIUM CHLORIDE, CALCIUM CHLORIDE 600; 310; 30; 20 MG/100ML; MG/100ML; MG/100ML; MG/100ML
INJECTION, SOLUTION INTRAVENOUS CONTINUOUS
Status: CANCELLED | OUTPATIENT
Start: 2022-08-08

## 2022-08-08 RX ORDER — LIDOCAINE HYDROCHLORIDE 10 MG/ML
1 INJECTION, SOLUTION EPIDURAL; INFILTRATION; INTRACAUDAL; PERINEURAL
Status: CANCELLED | OUTPATIENT
Start: 2022-08-08 | End: 2022-08-08

## 2022-08-08 NOTE — DISCHARGE INSTRUCTIONS
Normal changes you may experience after a EGD:  Activity   You have had anesthesia today  Do not drive, operate heavy equipment, consume alcoholic beverages, or make any important decisions  for 24 hours   Take your time changing positions today. You may feel light headed or dizzy if you move too quickly. Rest for the next 24 hours. Diet   You can eat your normal diet when you feel well. You should start off with bland foods like chicken soup, toast, or yogurt. Then advance as tolerated. Drink plenty of fluids (unless your doctor tells you not to). Your urine should be very lightly colored without a strong odor. Medicines   Continue your home medications as ordered by your physician.      Call your doctor now or seek immediate medical care if: [phone]  You are passing blood rectally or vomiting blood (color of blood may be red or black)  You have coffee ground looking vomit  Severe abdominal pain or tenderness    You have a fever, chills or excessive sweating   You have persistent nausea or vomiting   Redness or swelling at the IV site

## 2022-08-09 ENCOUNTER — ANESTHESIA (OUTPATIENT)
Dept: OPERATING ROOM | Age: 62
End: 2022-08-09
Payer: COMMERCIAL

## 2022-08-09 ENCOUNTER — HOSPITAL ENCOUNTER (OUTPATIENT)
Age: 62
Setting detail: OUTPATIENT SURGERY
Discharge: HOME | End: 2022-08-09
Attending: INTERNAL MEDICINE | Admitting: INTERNAL MEDICINE
Payer: COMMERCIAL

## 2022-08-09 VITALS
OXYGEN SATURATION: 97 % | TEMPERATURE: 98.2 F | SYSTOLIC BLOOD PRESSURE: 141 MMHG | HEART RATE: 62 BPM | BODY MASS INDEX: 29.88 KG/M2 | RESPIRATION RATE: 13 BRPM | WEIGHT: 175 LBS | DIASTOLIC BLOOD PRESSURE: 90 MMHG | HEIGHT: 64 IN

## 2022-08-09 DIAGNOSIS — K21.9 GASTROESOPHAGEAL REFLUX DISEASE, UNSPECIFIED WHETHER ESOPHAGITIS PRESENT: ICD-10-CM

## 2022-08-09 PROCEDURE — 2709999900 HC NON-CHARGEABLE SUPPLY: Performed by: INTERNAL MEDICINE

## 2022-08-09 PROCEDURE — 6360000002 HC RX W HCPCS: Performed by: NURSE ANESTHETIST, CERTIFIED REGISTERED

## 2022-08-09 PROCEDURE — 7100000011 HC PHASE II RECOVERY - ADDTL 15 MIN: Performed by: INTERNAL MEDICINE

## 2022-08-09 PROCEDURE — 88305 TISSUE EXAM BY PATHOLOGIST: CPT

## 2022-08-09 PROCEDURE — 3700000000 HC ANESTHESIA ATTENDED CARE: Performed by: INTERNAL MEDICINE

## 2022-08-09 PROCEDURE — 43239 EGD BIOPSY SINGLE/MULTIPLE: CPT | Performed by: INTERNAL MEDICINE

## 2022-08-09 PROCEDURE — 7100000010 HC PHASE II RECOVERY - FIRST 15 MIN: Performed by: INTERNAL MEDICINE

## 2022-08-09 PROCEDURE — 2500000003 HC RX 250 WO HCPCS: Performed by: NURSE ANESTHETIST, CERTIFIED REGISTERED

## 2022-08-09 PROCEDURE — 2580000003 HC RX 258: Performed by: NURSE ANESTHETIST, CERTIFIED REGISTERED

## 2022-08-09 PROCEDURE — 3609012400 HC EGD TRANSORAL BIOPSY SINGLE/MULTIPLE: Performed by: INTERNAL MEDICINE

## 2022-08-09 RX ORDER — SODIUM CHLORIDE, SODIUM LACTATE, POTASSIUM CHLORIDE, CALCIUM CHLORIDE 600; 310; 30; 20 MG/100ML; MG/100ML; MG/100ML; MG/100ML
INJECTION, SOLUTION INTRAVENOUS CONTINUOUS PRN
Status: DISCONTINUED | OUTPATIENT
Start: 2022-08-09 | End: 2022-08-09 | Stop reason: SDUPTHER

## 2022-08-09 RX ORDER — MORPHINE SULFATE 2 MG/ML
1 INJECTION, SOLUTION INTRAMUSCULAR; INTRAVENOUS EVERY 5 MIN PRN
Status: DISCONTINUED | OUTPATIENT
Start: 2022-08-09 | End: 2022-08-09 | Stop reason: HOSPADM

## 2022-08-09 RX ORDER — SODIUM CHLORIDE 0.9 % (FLUSH) 0.9 %
5-40 SYRINGE (ML) INJECTION PRN
Status: DISCONTINUED | OUTPATIENT
Start: 2022-08-09 | End: 2022-08-09 | Stop reason: HOSPADM

## 2022-08-09 RX ORDER — ONDANSETRON 2 MG/ML
4 INJECTION INTRAMUSCULAR; INTRAVENOUS
Status: DISCONTINUED | OUTPATIENT
Start: 2022-08-09 | End: 2022-08-09 | Stop reason: HOSPADM

## 2022-08-09 RX ORDER — LIDOCAINE HYDROCHLORIDE 10 MG/ML
INJECTION, SOLUTION EPIDURAL; INFILTRATION; INTRACAUDAL; PERINEURAL PRN
Status: DISCONTINUED | OUTPATIENT
Start: 2022-08-09 | End: 2022-08-09 | Stop reason: SDUPTHER

## 2022-08-09 RX ORDER — SODIUM CHLORIDE 9 MG/ML
25 INJECTION, SOLUTION INTRAVENOUS PRN
Status: DISCONTINUED | OUTPATIENT
Start: 2022-08-09 | End: 2022-08-09 | Stop reason: HOSPADM

## 2022-08-09 RX ORDER — DIPHENHYDRAMINE HYDROCHLORIDE 50 MG/ML
12.5 INJECTION INTRAMUSCULAR; INTRAVENOUS
Status: DISCONTINUED | OUTPATIENT
Start: 2022-08-09 | End: 2022-08-09 | Stop reason: HOSPADM

## 2022-08-09 RX ORDER — SODIUM CHLORIDE 0.9 % (FLUSH) 0.9 %
5-40 SYRINGE (ML) INJECTION EVERY 12 HOURS SCHEDULED
Status: DISCONTINUED | OUTPATIENT
Start: 2022-08-09 | End: 2022-08-09 | Stop reason: HOSPADM

## 2022-08-09 RX ORDER — PROPOFOL 10 MG/ML
INJECTION, EMULSION INTRAVENOUS PRN
Status: DISCONTINUED | OUTPATIENT
Start: 2022-08-09 | End: 2022-08-09 | Stop reason: SDUPTHER

## 2022-08-09 RX ADMIN — LIDOCAINE HYDROCHLORIDE 50 MG: 10 INJECTION, SOLUTION EPIDURAL; INFILTRATION; INTRACAUDAL; PERINEURAL at 09:25

## 2022-08-09 RX ADMIN — PROPOFOL 50 MG: 10 INJECTION, EMULSION INTRAVENOUS at 09:28

## 2022-08-09 RX ADMIN — PROPOFOL 100 MG: 10 INJECTION, EMULSION INTRAVENOUS at 09:26

## 2022-08-09 RX ADMIN — SODIUM CHLORIDE, POTASSIUM CHLORIDE, SODIUM LACTATE AND CALCIUM CHLORIDE: 600; 310; 30; 20 INJECTION, SOLUTION INTRAVENOUS at 09:20

## 2022-08-09 RX ADMIN — PROPOFOL 30 MG: 10 INJECTION, EMULSION INTRAVENOUS at 09:30

## 2022-08-09 ASSESSMENT — PAIN - FUNCTIONAL ASSESSMENT: PAIN_FUNCTIONAL_ASSESSMENT: 0-10

## 2022-08-09 NOTE — ANESTHESIA POSTPROCEDURE EVALUATION
POST- ANESTHESIA EVALUATION       Pt Name: Stella Austin  MRN: [id number]  [place]: 1960  Date of evaluation: 8/9/2022  Time:  10:18 AM      BP (!) 141/90   Pulse 62   Temp 98.2 °F (36.8 °C) (Temporal)   Resp 13   Ht 5' 4\" (1.626 m)   Wt 175 lb (79.4 kg)   SpO2 97%   BMI 30.04 kg/m²      Consciousness Level  Awake  Cardiopulmonary Status  Stable  Pain Adequately Treated YES  Nausea / Vomiting  NO  Adequate Hydration  YES  Anesthesia Related Complications NONE      Electronically signed by iKa Rojas MD on 8/9/2022 at 10:18 AM       Department of Anesthesiology  Postprocedure Note    Patient: Stella Austin  MRN: [id number]  [place]: 1960  Date of evaluation: 8/9/2022      Procedure Summary     Date: 08/09/22 Room / Location: [address]    Anesthesia Start: 1881 Anesthesia Stop: 7778    Procedure: EGD BIOPSY Diagnosis:       Gastroesophageal reflux disease, unspecified whether esophagitis present      (Gastroesophageal reflux disease, unspecified whether esophagitis present [K21.9])    Surgeons: Stephen Carter MD Responsible Provider: Kia Rojas MD    Anesthesia Type: MAC ASA Status: 2          Anesthesia Type: No value filed.     Zoë Phase I: Zoë Score: 10    Zoë Phase II: Zoë Score: 10      Anesthesia Post Evaluation

## 2022-08-09 NOTE — H&P
Procedure History and Physical    Pre-Procedural Diagnosis: GERD    Indications:  same    Procedure Planned: endoscopy     History Obtained From:  patient    HISTORY OF PRESENT ILLNESS:       The patient is a 60 y.o. female who presents for the above procedure. Past Medical History:    Past Medical History:   Diagnosis Date    Anxiety 2018    Gastroesophageal reflux disease without esophagitis 2019    Hyperlipidemia     Hypertension     Moderate episode of recurrent major depressive disorder ([facility] [address] 2018       Past Surgical History:    Past Surgical History:   Procedure Laterality Date    APPENDECTOMY      FINGER TRIGGER RELEASE Right     thumb    HYSTERECTOMY, TOTAL ABDOMINAL (CERVIX REMOVED)         Medications:  No current facility-administered medications for this encounter. Allergies:   No Known Allergies              Social   Social History     Tobacco Use    Smoking status: Former     Packs/day: 0.50     Years: 18.00     Pack years: 9.00     Types: Cigarettes     Quit date: 2020     Years since quittin.6    Smokeless tobacco: Never   Substance Use Topics    Alcohol use: No        PSYCH HISTORY:  Depression No  Anxiety No  Suicide No       Family History   Problem Relation Age of Onset    Cancer Mother     Breast Cancer Mother     Diabetes Father     High Blood Pressure Father     High Cholesterol Father       No family history of colon cancer, Crohn's disease, or ulcerative colitis    Problems with Sedation/Anesthesia in the past? no    REVIEW OF SYSTEMS:  12 point review of systems negative other than mentioned above.       PHYSICAL EXAM:    Vitals:  BP (!) 174/89   Pulse 67   Temp 97.6 °F (36.4 °C) (Temporal)   Resp 16   Ht 5' 4\" (1.626 m)   Wt 175 lb (79.4 kg)   SpO2 95%   BMI 30.04 kg/m²     Focused Exam related to procedure:    General appearance: NAD, conversant   Eyes: anicteric sclerae, moist conjunctivae; no lid-lag; PERRLA   Lungs: CTA, with normal

## 2022-08-09 NOTE — OP NOTE
Operative Note      Patient: Dipak Merino  YOB: 1960  MRN: [id number]    Date of Procedure: 8/9/2022    Pre-Op Diagnosis: Gastroesophageal reflux disease, unspecified whether esophagitis present [K21.9]    Post-Op Diagnosis: Hiatal hernia, gastritis. Procedure(s):  EGD BIOPSY    Surgeon(s):  Sally Murrieta MD    Assistant:   * No surgical staff found *    Anesthesia: Monitor Anesthesia Care    Estimated Blood Loss (mL): Minimal    Complications: None    Specimens:   ID Type Source Tests Collected by Time Destination   A :  STOMACH BIOPSY R/O H.PYLORI  Tissue Stomach SURGICAL PATHOLOGY Sally Murrieta MD 8/9/2022 8433        Implants:  * No implants in log *      Drains: * No LDAs found *              [place] [facility]    EGD    PROCEDURE DATE: 08/09/22    REFERRING PHYSICIAN: No ref. provider found     PRIMARY CARE PROVIDER: Norah Puente PA-C    ATTENDING PHYSICIAN: Sally Murrieta MD     HISTORY: Ms. Dipak Merino is a 60 y.o. female who presents to the  Endoscopy unit for upper endoscopy. The patient's clinical history is remarkable for anxiety, HL, GERD, referred for refractory GERD symptoms. She is currently medically stable and appropriate for the planned procedure. PREOPERATIVE DIAGNOSIS: refractory GERD symptoms. PROCEDURES:   1) Transoral Upper Endoscopy with cold biopsy. POSTOPERATIVE DIAGNOSIS:    1) Small sliding 2 cm hiatal hernia. Type 1. No esophagitis. 2) Mild gastritis, no ulcerations, no erosions. 3) Normal appearing duodenal mucosa     MEDICATIONS:   MAC per anesthesia     EBL:  <10cc    INSTRUMENT: Olympus GIF-H190  flexible Gastroscope. PREPARATION: The nature and character of the procedure as well as risks, benefits, and alternatives were discussed with the patient and informed consent was obtained.  Complications were said to include, but were not limited to: medication allergy, medication reaction, cardiovascular and respiratory problems, bleeding, perforation, infection, and/or missed diagnosis. Following arrival in the endoscopy room, the patient was placed in the left lateral decubitus position and final time-out accomplished in the presence of the nursing staff. Baseline vital signs were obtained and reviewed, and IV sedation was subsequently initiated. FINDINGS:   Esophagus: The esophagus was inspected to the Z-line. The endoscopic exam showed hiatal hernia. Stomach: The stomach was inspected in both forward and retroflex fashion and was appropriately distensible. The cardia, fundus, incisura, antrum and pylorus were identified via direct visualization. The endoscopic exam showed mild gastritis. Duodenum: The proximal small bowel was inspected through the bulb, sweep, and second portion of the duodenum. The endoscopic exam showed normal appearing duodenal mucosa. IMPRESSION:    1) Small sliding 2 cm hiatal hernia. Type 1. No esophagitis. 2) Mild gastritis, no ulcerations, no erosions. 3) Normal appearing duodenal mucosa       RECOMMENDATIONS:   1) Follow up path in GI clinic. Continue with anti-reflux lifestyle, continue with anti-reflux therapy. [facility]  Gastroenterology   08/09/22    this note is created with the assistance of a speech recognition program.  While intending to generate a document that actually reflects the content of the visit, the document can still have some errors including those of syntax and sound a like substitutions which may escape proof reading. It such instances, actual meaning can be extrapolated by contextual diversion. The patient was counseled at length about the risks of siva Covid-19 during their perioperative period and any recovery window from their procedure. The patient was made aware that siva Covid-19  may worsen their prognosis for recovering from their procedure  and lend to a higher morbidity and/or mortality risk.   All material

## 2022-08-09 NOTE — ANESTHESIA PRE PROCEDURE
Department of Anesthesiology  Preprocedure Note       Name:  Rafiq Rivers   Age:  60 y.o.  :  1960                                          MRN:  [id number]         Date:  2022      Surgeon: Francesca Madrigal):  Stanislav Carlos MD    Procedure: Procedure(s):  EGD BIOPSY    Medications prior to admission:   Prior to Admission medications    Medication Sig Start Date End Date Taking? Authorizing Provider   famotidine (PEPCID) 20 MG tablet take 1 tablet by mouth twice a day 22   Сергей Mendosa PA-C   Multiple Vitamins-Minerals (THERAPEUTIC MULTIVITAMIN-MINERALS) tablet Take 1 tablet by mouth daily women's 50+ advanced    Historical Provider, MD   metoprolol succinate (TOPROL XL) 100 MG extended release tablet take 1 tablet by mouth once daily 22   Сергей Mendosa PA-C   hydroCHLOROthiazide (HYDRODIURIL) 50 MG tablet Take 1 tablet by mouth every morning 22   Сергей Mendosa PA-C   hydrALAZINE (APRESOLINE) 25 MG tablet Take one pill TID 22   Сергей Mendosa PA-C   fenofibrate (TRIGLIDE) 160 MG tablet take 1 tablet by mouth once daily 22   Сергей Mendosa PA-C   buPROPion [facility] SR) 150 MG extended release tablet take 1 tablet by mouth twice a day 22   Сергей Mendosa PA-C   atorvastatin (LIPITOR) 40 MG tablet take 1 tablet by mouth at bedtime 22   Сергей Mendosa PA-C   triamcinolone (KENALOG) 0.1 % ointment triamcinolone acetonide 0.1 % topical ointment   apply to affected area twice a day    Historical Provider, MD   aspirin 81 MG chewable tablet aspirin 81 mg chewable tablet   Chew 1 tablet every day by oral route. Historical Provider, MD   Cholecalciferol (VITAMIN D) 50 MCG (2000) CAPS capsule Take by mouth    Historical Provider, MD       Current medications:    No current facility-administered medications for this encounter.        Allergies:  No Known Allergies    Problem List:    Patient Active Problem List   Diagnosis Code    Family history of breast cancer Z80.3  Essential hypertension I10    Moderate episode of recurrent major depressive disorder (HCC) F33.1    Anxiety F41.9    Mixed hyperlipidemia E78.2    Gastroesophageal reflux disease without esophagitis K21.9    Acquired trigger finger M65.30       Past Medical History:        Diagnosis Date    Anxiety 2018    Gastroesophageal reflux disease without esophagitis 2019    Hyperlipidemia     Hypertension     Moderate episode of recurrent major depressive disorder ([address] 2018       Past Surgical History:        Procedure Laterality Date    APPENDECTOMY      FINGER TRIGGER RELEASE Right     thumb    HYSTERECTOMY, TOTAL ABDOMINAL (CERVIX REMOVED)         Social History:    Social History     Tobacco Use    Smoking status: Former     Packs/day: 0.50     Years: 18.00     Pack years: 9.00     Types: Cigarettes     Quit date: 2020     Years since quittin.6    Smokeless tobacco: Never   Substance Use Topics    Alcohol use: No                                Counseling given: Not Answered      Vital Signs (Current):   Vitals:    22 0801   BP: (!) 174/89   Pulse: 67   Resp: 16   Temp: 97.6 °F (36.4 °C)   TempSrc: Temporal   SpO2: 95%   Weight: 175 lb (79.4 kg)   Height: 5' 4\" (1.626 m)                                              BP Readings from Last 3 Encounters:   22 (!) 174/89   22 138/82   22 110/80       NPO Status: Time of last liquid consumption:                         Time of last solid consumption:                         Date of last liquid consumption: 22                        Date of last solid food consumption: 22    BMI:   Wt Readings from Last 3 Encounters:   22 175 lb (79.4 kg)   22 175 lb 3.2 oz (79.5 kg)   22 176 lb 9.6 oz (80.1 kg)     Body mass index is 30.04 kg/m².     CBC:   Lab Results   Component Value Date/Time    WBC 7.2 2022 08:23 AM    RBC 4.53 2022 08:23 AM    HGB 14.0 2022 08:23 AM HCT 41.1 08/08/2022 08:23 AM    MCV 90.7 08/08/2022 08:23 AM    RDW 12.6 08/08/2022 08:23 AM     08/08/2022 08:23 AM       CMP:   Lab Results   Component Value Date/Time     08/08/2022 08:23 AM    K 4.2 08/08/2022 08:23 AM     08/08/2022 08:23 AM    CO2 25 08/08/2022 08:23 AM    BUN 28 08/08/2022 08:23 AM    CREATININE 1.27 08/08/2022 08:23 AM    GFRAA 52 08/08/2022 08:23 AM    LABGLOM 43 08/08/2022 08:23 AM    GLUCOSE 112 08/08/2022 08:23 AM    PROT 7.2 08/08/2022 08:23 AM    CALCIUM 10.5 08/08/2022 08:23 AM    BILITOT 0.32 08/08/2022 08:23 AM    ALKPHOS 39 08/08/2022 08:23 AM    AST 27 08/08/2022 08:23 AM    ALT 38 08/08/2022 08:23 AM       POC Tests: No results for input(s): POCGLU, POCNA, POCK, POCCL, POCBUN, POCHEMO, POCHCT in the last 72 hours.     Coags: No results found for: PROTIME, INR, APTT    HCG (If Applicable): No results found for: PREGTESTUR, PREGSERUM, HCG, HCGQUANT     ABGs: No results found for: PHART, PO2ART, QIA7XNK, AGI8NSL, BEART, R7ATXHNF     Type & Screen (If Applicable):  No results found for: LABABO, LABRH    Drug/Infectious Status (If Applicable):  Lab Results   Component Value Date/Time    HEPCAB NONREACTIVE 11/12/2019 10:55 AM       COVID-19 Screening (If Applicable): No results found for: COVID19        Anesthesia Evaluation  Patient summary reviewed and Nursing notes reviewed no history of anesthetic complications:   Airway: Mallampati: II  TM distance: >3 FB   Neck ROM: full  Mouth opening: > = 3 FB   Dental: normal exam         Pulmonary:Negative Pulmonary ROS and normal exam  breath sounds clear to auscultation                             Cardiovascular:    (+) hypertension: no interval change, hyperlipidemia        Rhythm: regular  Rate: normal                    Neuro/Psych:   (+) psychiatric history: stable with treatmentdepression/anxiety             GI/Hepatic/Renal:   (+) GERD: no interval change,           Endo/Other: Negative Endo/Other ROS Abdominal:       Abdomen: soft. Vascular: negative vascular ROS. Other Findings:           Anesthesia Plan      MAC     ASA 2             Anesthetic plan and risks discussed with patient. Plan discussed with CRNA.                     Mervin Mendoza MD   8/9/2022

## 2022-08-11 LAB — SURGICAL PATHOLOGY REPORT: NORMAL

## 2022-10-04 ENCOUNTER — HOSPITAL ENCOUNTER (OUTPATIENT)
Age: 62
Setting detail: SPECIMEN
Discharge: HOME | End: 2022-10-04

## 2022-10-04 DIAGNOSIS — N18.30 BENIGN HYPERTENSION WITH CKD (CHRONIC KIDNEY DISEASE) STAGE III (HCC): ICD-10-CM

## 2022-10-04 DIAGNOSIS — I12.9 BENIGN HYPERTENSION WITH CKD (CHRONIC KIDNEY DISEASE) STAGE III (HCC): ICD-10-CM

## 2022-10-04 DIAGNOSIS — N18.31 STAGE 3A CHRONIC KIDNEY DISEASE (HCC): ICD-10-CM

## 2022-10-04 LAB
ABSOLUTE EOS #: 0.23 K/UL (ref 0–0.44)
ABSOLUTE IMMATURE GRANULOCYTE: 0.04 K/UL (ref 0–0.3)
ABSOLUTE LYMPH #: 2.88 K/UL (ref 1.1–3.7)
ABSOLUTE MONO #: 0.63 K/UL (ref 0.1–1.2)
ANION GAP SERPL CALCULATED.3IONS-SCNC: 24 MMOL/L (ref 9–17)
BASOPHILS # BLD: 1 % (ref 0–2)
BASOPHILS ABSOLUTE: 0.09 K/UL (ref 0–0.2)
BUN BLDV-MCNC: 32 MG/DL (ref 8–23)
CALCIUM SERPL-MCNC: 10.9 MG/DL (ref 8.6–10.4)
CHLORIDE BLD-SCNC: 101 MMOL/L (ref 98–107)
CO2: 19 MMOL/L (ref 20–31)
CREAT SERPL-MCNC: 1.55 MG/DL (ref 0.5–0.9)
EOSINOPHILS RELATIVE PERCENT: 3 % (ref 1–4)
GFR SERPL CREATININE-BSD FRML MDRD: 37 ML/MIN/1.73M2
HCT VFR BLD CALC: 43.3 % (ref 36.3–47.1)
HEMOGLOBIN: 14.1 G/DL (ref 11.9–15.1)
IMMATURE GRANULOCYTES: 1 %
LYMPHOCYTES # BLD: 36 % (ref 24–43)
MAGNESIUM: 1.8 MG/DL (ref 1.6–2.6)
MCH RBC QN AUTO: 30.4 PG (ref 25.2–33.5)
MCHC RBC AUTO-ENTMCNC: 32.6 G/DL (ref 28.4–34.8)
MCV RBC AUTO: 93.3 FL (ref 82.6–102.9)
MONOCYTES # BLD: 8 % (ref 3–12)
NRBC AUTOMATED: 0 PER 100 WBC
PDW BLD-RTO: 13.3 % (ref 11.8–14.4)
PHOSPHORUS: 3.6 MG/DL (ref 2.6–4.5)
PLATELET # BLD: 470 K/UL (ref 138–453)
PMV BLD AUTO: 10.9 FL (ref 8.1–13.5)
POTASSIUM SERPL-SCNC: 4 MMOL/L (ref 3.7–5.3)
RBC # BLD: 4.64 M/UL (ref 3.95–5.11)
SEG NEUTROPHILS: 51 % (ref 36–65)
SEGMENTED NEUTROPHILS ABSOLUTE COUNT: 4.16 K/UL (ref 1.5–8.1)
SODIUM BLD-SCNC: 144 MMOL/L (ref 135–144)
WBC # BLD: 8 K/UL (ref 3.5–11.3)

## 2022-10-10 ENCOUNTER — HOSPITAL ENCOUNTER (OUTPATIENT)
Age: 62
Setting detail: SPECIMEN
Discharge: HOME | End: 2022-10-10

## 2022-10-10 DIAGNOSIS — I12.9 BENIGN HYPERTENSION WITH CKD (CHRONIC KIDNEY DISEASE) STAGE III (HCC): ICD-10-CM

## 2022-10-10 DIAGNOSIS — N18.30 BENIGN HYPERTENSION WITH CKD (CHRONIC KIDNEY DISEASE) STAGE III (HCC): ICD-10-CM

## 2022-10-10 DIAGNOSIS — N18.32 STAGE 3B CHRONIC KIDNEY DISEASE (HCC): ICD-10-CM

## 2022-10-10 LAB
FREE KAPPA/LAMBDA RATIO: 0.8 (ref 0.26–1.65)
KAPPA FREE LIGHT CHAINS QNT: 1.86 MG/DL (ref 0.37–1.94)
LAMBDA FREE LIGHT CHAINS QNT: 2.32 MG/DL (ref 0.57–2.63)
PTH INTACT: 14.2 PG/ML (ref 14–72)

## 2022-10-12 ENCOUNTER — TELEPHONE (OUTPATIENT)
Dept: ONCOLOGY | Age: 62
End: 2022-10-12

## 2022-10-12 NOTE — TELEPHONE ENCOUNTER
CT Lung Screening scheduled for 10/21/22. Patient does not meet criteria based on pack history. Patient has pack year of 9, criteria is pack year of 20 or greater. Scheduling to call patient to cancel. CT LUNG SCREENING CRITERIA  Patient is between the ages of 49-80. Is currently smoking or is a former smoker who has quit smoking within the last 15 years  Has at least a 20 pack-year smoking history (regardless of patient being a current or former smoker). Pack history is packs per day times years smoked equals pack history. IE:  1 pack a day X 20 years = 20 pack year history. Has not had a CT lung screening or any CT chest exam within the last year  Patient is asymptomatic (no signs/symptoms of lung cancer such as shortness of breath, cough, coughing up blood or unexplained significant weight loss). This patient does not have a condition that limits life expectancy to <5 years  This patient has participated in a shared decision-making session during which potential risks and benefits of CT Lung Screening were discussed. This patient was informed of the importance of adherence to annual screening, impact of comorbidities, and ability/willingness to undergo diagnosis and treatment. This patient was informed of the importance of smoking cessation and/or maintaining smoking abstinence. If applicable, this patient was offered information on smoking cessation counseling services. ORDER MUST INCLUDE: THIS INFORMATION IS AUTOMATICALLY ON ORDER IF ENTERED IN [facility]  PACK HISTORY   QUIT DATE IF THEY HAVE QUIT  DIAGNOSIS: Z87.891 PERSONAL HISTORY OF TABACCO USE OR PERSONAL HISTORY OF NICOTINE DEPENDENCE. (BOTH HAS THE SAME Z CODE) THIS IS REQUIRED FOR MEDICARE/MEDICAID. FOR PRIVATE INSURANCES DIAGNOSIS CODE CAN BE: Z12.2 ENCOUNTER FOR SCREENING FOR MALIGNANT NEOPLASM OF RESPIRATORY ORGANS OR Z87.891 PERSONAL HISTORY OF TABACCO USE OR PERSONAL HISTORY OF NICOTINE DEPENDENCE.

## 2022-10-25 ENCOUNTER — OFFICE VISIT (OUTPATIENT)
Dept: GASTROENTEROLOGY | Age: 62
End: 2022-10-25
Payer: COMMERCIAL

## 2022-10-25 VITALS
DIASTOLIC BLOOD PRESSURE: 102 MMHG | WEIGHT: 177 LBS | HEIGHT: 64 IN | SYSTOLIC BLOOD PRESSURE: 160 MMHG | BODY MASS INDEX: 30.22 KG/M2

## 2022-10-25 DIAGNOSIS — K21.9 GASTROESOPHAGEAL REFLUX DISEASE WITHOUT ESOPHAGITIS: Primary | ICD-10-CM

## 2022-10-25 PROCEDURE — 1036F TOBACCO NON-USER: CPT | Performed by: INTERNAL MEDICINE

## 2022-10-25 PROCEDURE — G8484 FLU IMMUNIZE NO ADMIN: HCPCS | Performed by: INTERNAL MEDICINE

## 2022-10-25 PROCEDURE — G8417 CALC BMI ABV UP PARAM F/U: HCPCS | Performed by: INTERNAL MEDICINE

## 2022-10-25 PROCEDURE — 3017F COLORECTAL CA SCREEN DOC REV: CPT | Performed by: INTERNAL MEDICINE

## 2022-10-25 PROCEDURE — G8428 CUR MEDS NOT DOCUMENT: HCPCS | Performed by: INTERNAL MEDICINE

## 2022-10-25 PROCEDURE — 99213 OFFICE O/P EST LOW 20 MIN: CPT | Performed by: INTERNAL MEDICINE

## 2022-10-25 RX ORDER — OMEPRAZOLE 20 MG/1
20 TABLET, DELAYED RELEASE ORAL 2 TIMES DAILY
Qty: 60 TABLET | Refills: 3 | Status: SHIPPED | OUTPATIENT
Start: 2022-10-25

## 2022-10-25 ASSESSMENT — ENCOUNTER SYMPTOMS
DIARRHEA: 0
WHEEZING: 0
VOMITING: 0
SHORTNESS OF BREATH: 0
NAUSEA: 1
ANAL BLEEDING: 0
ABDOMINAL PAIN: 0
BLOOD IN STOOL: 0
CHOKING: 0
VOICE CHANGE: 0
ABDOMINAL DISTENTION: 0
CONSTIPATION: 0
TROUBLE SWALLOWING: 1
COUGH: 0
RECTAL PAIN: 0

## 2022-10-25 NOTE — PROGRESS NOTES
Reason for Referral: GERD      No referring provider defined for this encounter. Chief Complaint   Patient presents with    Gastroesophageal Reflux     Pt feeling good,  Pt had made diet changes. HISTORY OF PRESENT ILLNESS: Ms.Cynthia Eugene Traore is a 60 y.o. female with a past history remarkable for active smoker approximately 2 to 4 cigarettes/day x 30 years, chronic GERD symptoms, unresponsive to PPI therapy, currently on Pepcid twice daily which has been quite effective for the patient, referred for evaluation of longstanding acid reflux symptoms. Patient reports prior upper endoscopy performed at [facility] in [address] [place] [address]. Negative for any obvious structural causes. He denies any postprandial dyspepsia. Denies any dysphagia symptoms. No obvious alarming symptoms aside from longstanding acid reflux. Patient reports approximately 3 more symptoms per week of acid reflux heartburn and a mild associated postprandial dyspepsia. Denies any NSAID use. Smoker: active smoker 2-4 cig/day, x 30 yrs. Drinking history: none   Illicit drugs: Cannibus, edibles, smoke,   Abdominal surgeries: Appendectomy, hysterectomy. Prior Colonoscopy: Cologuard-- negative    Prior EGD: \[facility] [place] [facility]\""og 22   FH of GI issues: None       Past Medical,Family, and Social History reviewed and does contribute to the patient presentingcondition. Patient's PMH/PSH,SH,PSYCH Hx, MEDs, ALLERGIES, and ROS were all reviewed and updated in the appropriate sections.     PAST MEDICAL HISTORY:  Past Medical History:   Diagnosis Date    Anxiety 9/14/2018    Gastroesophageal reflux disease without esophagitis 2/27/2019    Hyperlipidemia     Hypertension     Moderate episode of recurrent major depressive disorder ([facility] [address] 9/14/2018       Past Surgical History:   Procedure Laterality Date    APPENDECTOMY      FINGER TRIGGER RELEASE Right     thumb    HYSTERECTOMY, TOTAL ABDOMINAL (CERVIX REMOVED)      UPPER GASTROINTESTINAL ENDOSCOPY N/A 8/9/2022    EGD BIOPSY performed by Zoraida Wheeler MD at [address]:    Current Outpatient Medications:     omeprazole (PRILOSEC OTC) 20 MG tablet, Take 1 tablet by mouth in the morning and at bedtime, Disp: 60 tablet, Rfl: 3    famotidine (PEPCID) 20 MG tablet, take 1 tablet by mouth twice a day, Disp: 60 tablet, Rfl: 2    Omega-3 Fatty Acids (FISH OIL) 1200 MG CAPS, Take by mouth in the morning and at bedtime, Disp: , Rfl:     hydrALAZINE (APRESOLINE) 25 MG tablet, Take 2 tablets by mouth 3 times daily Take one pill TID, Disp: 90 tablet, Rfl: 2    Multiple Vitamins-Minerals (THERAPEUTIC MULTIVITAMIN-MINERALS) tablet, Take 1 tablet by mouth daily women's 50+ advanced, Disp: , Rfl:     metoprolol succinate (TOPROL XL) 100 MG extended release tablet, take 1 tablet by mouth once daily, Disp: 90 tablet, Rfl: 2    fenofibrate (TRIGLIDE) 160 MG tablet, take 1 tablet by mouth once daily, Disp: 90 tablet, Rfl: 2    buPROPion (WELLBUTRIN SR) 150 MG extended release tablet, take 1 tablet by mouth twice a day, Disp: 180 tablet, Rfl: 2    atorvastatin (LIPITOR) 40 MG tablet, take 1 tablet by mouth at bedtime, Disp: 90 tablet, Rfl: 2    triamcinolone (KENALOG) 0.1 % ointment, , Disp: , Rfl:     aspirin 81 MG chewable tablet, aspirin 81 mg chewable tablet  Chew 1 tablet every day by oral route., Disp: , Rfl:     Cholecalciferol (VITAMIN D) 50 MCG (2000 UT) CAPS capsule, Take by mouth, Disp: , Rfl:     ALLERGIES:   No Known Allergies    FAMILY HISTORY:       Problem Relation Age of Onset    Cancer Mother     Breast Cancer Mother     Diabetes Father     High Blood Pressure Father     High Cholesterol Father          SOCIAL HISTORY:   Social History     Socioeconomic History    Marital status:      Spouse name: Not on file    Number of children: Not on file    Years of education: Not on file    Highest education level: Not on file   Occupational History    Not on file   Tobacco Use    Smoking status: Former     Packs/day: 0.50     Years: 18.00     Pack years: 9.00     Types: Cigarettes     Quit date: 2020     Years since quittin.8    Smokeless tobacco: Never   Vaping Use    Vaping Use: Never used   Substance and Sexual Activity    Alcohol use: No    Drug use: No    Sexual activity: Not on file   Other Topics Concern    Not on file   Social History Narrative    Not on file     Social Determinants of Health     Financial Resource Strain: High Risk    Difficulty of Paying Living Expenses: Hard   Food Insecurity: Food Insecurity Present    Worried About Running Out of Food in the Last Year: Sometimes true    Ran Out of Food in the Last Year: Sometimes true   Transportation Needs: Not on file   Physical Activity: Not on file   Stress: Not on file   Social Connections: Not on file   Intimate Partner Violence: Not on file   Housing Stability: Not on file         REVIEW OF SYSTEMS: A 12-point review of systems was obtained and pertinent positives and negatives were listed below. REVIEW OF SYSTEMS:     Constitutional: No fever, no chills, no lethargy, no weakness. HEENT:  No headache, otalgia, itchy eyes, nasal discharge or sore throat. Cardiac:  No chest pain, dyspnea, orthopnea or PND. Chest:   No cough, phlegm or wheezing. Abdomen:      Detailed by MA   Neuro:  No focal weakness, abnormal movements or seizure like activity. Skin:   No rashes, no itching. :   No hematuria, no pyuria, no dysuria, no flank pain. Extremities:  No swelling or joint pains. ROS was otherwise negative    Review of Systems   Constitutional:  Negative for appetite change, fatigue and unexpected weight change. HENT:  Positive for trouble swallowing. Negative for voice change. Eyes:  Negative for visual disturbance. Respiratory:  Negative for cough, choking, shortness of breath and wheezing. Cardiovascular:  Negative for chest pain, palpitations and leg swelling.    Gastrointestinal:  Positive for nausea. Negative for abdominal distention, abdominal pain, anal bleeding, blood in stool, constipation, diarrhea, rectal pain and vomiting. Genitourinary:  Negative for difficulty urinating. Neurological:  Positive for dizziness (when food is stuck in throat). Negative for seizures, weakness, numbness and headaches. Hematological:  Does not bruise/bleed easily. Psychiatric/Behavioral:  Negative for confusion and sleep disturbance. The patient is not nervous/anxious. PHYSICAL EXAMINATION: Vital signs reviewed per the nursing documentation. BP (!) 160/102 (Site: Left Upper Arm, Position: Sitting, Cuff Size: Small Adult)   Ht 5' 4\" (1.626 m)   Wt 177 lb (80.3 kg)   BMI 30.38 kg/m²   Body mass index is 30.38 kg/m². Physical Exam    Physical Exam   Constitutional: Patient is oriented to person, place, and time. Patient appears well-developed and well-nourished. HENT:   Head: Normocephalic and atraumatic. Eyes: Pupils are equal, round, and reactive to light. EOM are normal.   Neck: Normal range of motion. Neck supple. No JVD present. No tracheal deviation present. No thyromegaly present. Cardiovascular: Normal rate, regular rhythm, normal heart sounds and intact distal pulses. Pulmonary/Chest: Effort normal and breath sounds normal. No stridor. No respiratory distress. He has no wheezes. He has no rales. He exhibits no tenderness. Abdominal: Soft. Bowel sounds are normal. He exhibits no distension and no mass. There is no tenderness. There is no rebound and no guarding. No hernia. Musculoskeletal: Normal range of motion. Lymphadenopathy:    Patient has no cervical adenopathy. Neurological: Patient is alert and oriented to person, place, and time. Psychiatric: Patient has a normal mood and affect.  Patient behavior is normal.       LABORATORY DATA: Reviewed  Lab Results   Component Value Date    WBC 8.0 10/04/2022    HGB 14.1 10/04/2022    HCT 43.3 10/04/2022    MCV 93.3 10/04/2022     (H) 10/04/2022     10/04/2022    K 4.0 10/04/2022     10/04/2022    CO2 19 (L) 10/04/2022    BUN 32 (H) 10/04/2022    CREATININE 1.55 (H) 10/04/2022    LABALBU 4.9 08/08/2022    BILITOT 0.32 08/08/2022    ALKPHOS 39 08/08/2022    AST 27 08/08/2022    ALT 38 (H) 08/08/2022         Lab Results   Component Value Date    RBC 4.64 10/04/2022    HGB 14.1 10/04/2022    MCV 93.3 10/04/2022    MCH 30.4 10/04/2022    MCHC 32.6 10/04/2022    RDW 13.3 10/04/2022    MPV 10.9 10/04/2022    BASOPCT 1 10/04/2022    LYMPHSABS 2.88 10/04/2022    MONOSABS 0.63 10/04/2022    NEUTROABS 4.16 10/04/2022    EOSABS 0.23 10/04/2022    BASOSABS 0.09 10/04/2022         DIAGNOSTIC TESTING:     No results found. IMPRESSION: Ms.Cynthia Lilly Michelle is a 60 y.o. female with a past history remarkable for active smoker approximately 2 to 4 cigarettes/day x 30 years, chronic GERD symptoms, unresponsive to PPI therapy, currently on Pepcid twice daily which has been quite effective for the patient, referred for evaluation of longstanding acid reflux symptoms. Patient reports prior upper endoscopy performed at [facility] in [facility]. Negative for any obvious structural causes. He denies any postprandial dyspepsia. Denies any dysphagia symptoms. No obvious alarming symptoms aside from longstanding acid reflux. Patient reports approximately 3 more symptoms per week of acid reflux heartburn and a mild associated postprandial dyspepsia. Denies any NSAID use. Assessment  1. Gastroesophageal reflux disease without esophagitis        Adriana Jimenez was seen today for new patient and gastroesophageal reflux. Diagnoses and all orders for this visit:    Gastroesophageal reflux disease, unspecified whether esophagitis present-typical symptoms, frequent symptoms and only responsive to Pepcid complete over-the-counter twice daily. Patient continue with this for the time being.   May consider alternative regimens in the future. Patient was unresponsive to PPI therapy in the past, has tried Prevacid, Nexium, Protonix and Prilosec without significant relief. Strongly advised smoking cessation. Per patient, able to do so dietary modifications recommended. Patient has mild refractory symptoms despite no obvious abdominal cholecystectomy the patients symptoms. Patient was identified to have a 2 cm hiatal hernia. RTC: 3 months. Additional comments: Thank you for allowing me to participate in the care of Ms. Cantua. For any further questions please do not hesitate to contact me. I have reviewed and agree with the MA/LPN ROS please refer to their documentation from today's encounter on a separate note. Keith Carpenter MD, MPH   Board Certified in Gastroenterology  Board Certified in [address] [place] [address] #: [phone]          this note is created with the assistance of a speech recognition program.  While intending to generate a document that actually reflects the content of the visit, the document can still have some errors including those of syntax and sound a like substitutions which may escape proof reading. It such instances, actual meaning can be extrapolated by contextual diversion.

## 2023-02-02 ENCOUNTER — TELEPHONE (OUTPATIENT)
Dept: ONCOLOGY | Age: 63
End: 2023-02-02

## 2023-02-02 NOTE — LETTER
2/2/2023        [facility]    Dear Saida Romero:    Your healthcare provider has ordered a low dose CT scan of the chest for lung cancer screening. Enclosed you will find information about CT lung screening and smoking cessation resources. If you are unable to keep you appointment for you CT lung screening, please call our scheduling department at [phone]. Keep in mind that CT lung screening does not take the place of smoking cessation. Please do not hesitate to contact me if you have any questions or concerns.     [address] [facility] Screening [facility]  [phone]

## 2023-02-09 ENCOUNTER — HOSPITAL ENCOUNTER (OUTPATIENT)
Dept: CT IMAGING | Age: 63
Discharge: HOME | End: 2023-02-11
Payer: COMMERCIAL

## 2023-02-09 ENCOUNTER — HOSPITAL ENCOUNTER (OUTPATIENT)
Age: 63
Discharge: HOME | End: 2023-02-09
Payer: COMMERCIAL

## 2023-02-09 ENCOUNTER — HOSPITAL ENCOUNTER (OUTPATIENT)
Dept: NON INVASIVE DIAGNOSTICS | Age: 63
Discharge: HOME | End: 2023-02-09
Payer: COMMERCIAL

## 2023-02-09 VITALS — HEIGHT: 64 IN | BODY MASS INDEX: 29.02 KG/M2 | WEIGHT: 170 LBS

## 2023-02-09 DIAGNOSIS — Z82.49 FAMILY HISTORY OF CARDIAC DISORDER IN FATHER: ICD-10-CM

## 2023-02-09 DIAGNOSIS — R07.89 CHEST PRESSURE: ICD-10-CM

## 2023-02-09 DIAGNOSIS — Z87.891 FORMER CIGARETTE SMOKER: ICD-10-CM

## 2023-02-09 DIAGNOSIS — R00.2 PALPITATIONS: ICD-10-CM

## 2023-02-09 DIAGNOSIS — I10 ESSENTIAL HYPERTENSION: ICD-10-CM

## 2023-02-09 DIAGNOSIS — Z76.89 ESTABLISHING CARE WITH NEW DOCTOR, ENCOUNTER FOR: ICD-10-CM

## 2023-02-09 LAB
EKG ATRIAL RATE: 71 BPM
EKG P AXIS: 79 DEGREES
EKG P-R INTERVAL: 176 MS
EKG Q-T INTERVAL: 382 MS
EKG QRS DURATION: 86 MS
EKG QTC CALCULATION (BAZETT): 415 MS
EKG R AXIS: 48 DEGREES
EKG T AXIS: 65 DEGREES
EKG VENTRICULAR RATE: 71 BPM
LV EF: 55 %
LVEF MODALITY: NORMAL

## 2023-02-09 PROCEDURE — 71271 CT THORAX LUNG CANCER SCR C-: CPT

## 2023-02-09 PROCEDURE — 93306 TTE W/DOPPLER COMPLETE: CPT

## 2023-02-09 PROCEDURE — 93005 ELECTROCARDIOGRAM TRACING: CPT

## 2023-02-10 ENCOUNTER — HOSPITAL ENCOUNTER (OUTPATIENT)
Age: 63
Setting detail: SPECIMEN
Discharge: HOME | End: 2023-02-10

## 2023-02-10 DIAGNOSIS — R00.2 PALPITATIONS: ICD-10-CM

## 2023-02-10 DIAGNOSIS — Z82.49 FAMILY HISTORY OF CARDIAC DISORDER IN FATHER: ICD-10-CM

## 2023-02-10 DIAGNOSIS — I10 ESSENTIAL HYPERTENSION: ICD-10-CM

## 2023-02-10 DIAGNOSIS — R07.89 CHEST PRESSURE: ICD-10-CM

## 2023-02-10 DIAGNOSIS — F41.9 ANXIETY: ICD-10-CM

## 2023-02-10 DIAGNOSIS — Z87.891 FORMER CIGARETTE SMOKER: ICD-10-CM

## 2023-02-10 DIAGNOSIS — E78.2 MIXED HYPERLIPIDEMIA: ICD-10-CM

## 2023-02-10 DIAGNOSIS — E66.3 OVERWEIGHT (BMI 25.0-29.9): ICD-10-CM

## 2023-02-10 DIAGNOSIS — Z76.89 ESTABLISHING CARE WITH NEW DOCTOR, ENCOUNTER FOR: ICD-10-CM

## 2023-02-10 DIAGNOSIS — E55.9 VITAMIN D DEFICIENCY: ICD-10-CM

## 2023-02-10 DIAGNOSIS — E78.1 HYPERTRIGLYCERIDEMIA: ICD-10-CM

## 2023-02-10 LAB
25(OH)D3 SERPL-MCNC: 53.8 NG/ML
ABSOLUTE EOS #: 0.14 K/UL (ref 0–0.44)
ABSOLUTE IMMATURE GRANULOCYTE: <0.03 K/UL (ref 0–0.3)
ABSOLUTE LYMPH #: 2.15 K/UL (ref 1.1–3.7)
ABSOLUTE MONO #: 0.57 K/UL (ref 0.1–1.2)
ALBUMIN SERPL-MCNC: 4.8 G/DL (ref 3.5–5.2)
ALBUMIN/GLOBULIN RATIO: 2 (ref 1–2.5)
ALP SERPL-CCNC: 58 U/L (ref 35–104)
ALT SERPL-CCNC: 23 U/L (ref 5–33)
ANION GAP SERPL CALCULATED.3IONS-SCNC: 15 MMOL/L (ref 9–17)
AST SERPL-CCNC: 21 U/L
BASOPHILS # BLD: 1 % (ref 0–2)
BASOPHILS ABSOLUTE: 0.07 K/UL (ref 0–0.2)
BILIRUB SERPL-MCNC: 0.2 MG/DL (ref 0.3–1.2)
BUN SERPL-MCNC: 24 MG/DL (ref 8–23)
CALCIUM SERPL-MCNC: 10.4 MG/DL (ref 8.6–10.4)
CHLORIDE SERPL-SCNC: 105 MMOL/L (ref 98–107)
CHOLEST SERPL-MCNC: 149 MG/DL
CHOLESTEROL/HDL RATIO: 3.2
CO2 SERPL-SCNC: 21 MMOL/L (ref 20–31)
CREAT SERPL-MCNC: 1.03 MG/DL (ref 0.5–0.9)
EOSINOPHILS RELATIVE PERCENT: 2 % (ref 1–4)
GFR SERPL CREATININE-BSD FRML MDRD: >60 ML/MIN/1.73M2
GLUCOSE SERPL-MCNC: 90 MG/DL (ref 70–99)
HCT VFR BLD AUTO: 44.1 % (ref 36.3–47.1)
HDLC SERPL-MCNC: 46 MG/DL
HGB BLD-MCNC: 13.7 G/DL (ref 11.9–15.1)
IMMATURE GRANULOCYTES: 0 %
LDLC SERPL CALC-MCNC: 77 MG/DL (ref 0–130)
LYMPHOCYTES # BLD: 29 % (ref 24–43)
MCH RBC QN AUTO: 28.9 PG (ref 25.2–33.5)
MCHC RBC AUTO-ENTMCNC: 31.1 G/DL (ref 28.4–34.8)
MCV RBC AUTO: 93 FL (ref 82.6–102.9)
MONOCYTES # BLD: 8 % (ref 3–12)
NRBC AUTOMATED: 0 PER 100 WBC
PDW BLD-RTO: 12.7 % (ref 11.8–14.4)
PLATELET # BLD AUTO: 460 K/UL (ref 138–453)
PMV BLD AUTO: 10.6 FL (ref 8.1–13.5)
POTASSIUM SERPL-SCNC: 5 MMOL/L (ref 3.7–5.3)
PROT SERPL-MCNC: 7.2 G/DL (ref 6.4–8.3)
RBC # BLD: 4.74 M/UL (ref 3.95–5.11)
SEG NEUTROPHILS: 60 % (ref 36–65)
SEGMENTED NEUTROPHILS ABSOLUTE COUNT: 4.36 K/UL (ref 1.5–8.1)
SODIUM SERPL-SCNC: 141 MMOL/L (ref 135–144)
TRIGL SERPL-MCNC: 129 MG/DL
TSH SERPL-ACNC: 0.59 UIU/ML (ref 0.3–5)
WBC # BLD AUTO: 7.3 K/UL (ref 3.5–11.3)

## 2023-02-12 LAB
EST. AVERAGE GLUCOSE BLD GHB EST-MCNC: 120 MG/DL
HBA1C MFR BLD: 5.8 % (ref 4–6)

## 2023-02-15 RX ORDER — OMEPRAZOLE 20 MG/1
CAPSULE, DELAYED RELEASE ORAL
Qty: 60 CAPSULE | Refills: 2 | Status: SHIPPED | OUTPATIENT
Start: 2023-02-15

## 2023-03-23 ENCOUNTER — OFFICE VISIT (OUTPATIENT)
Dept: GASTROENTEROLOGY | Age: 63
End: 2023-03-23
Payer: COMMERCIAL

## 2023-03-23 VITALS
SYSTOLIC BLOOD PRESSURE: 138 MMHG | DIASTOLIC BLOOD PRESSURE: 78 MMHG | WEIGHT: 176 LBS | BODY MASS INDEX: 30.05 KG/M2 | HEIGHT: 64 IN

## 2023-03-23 DIAGNOSIS — K21.9 GASTROESOPHAGEAL REFLUX DISEASE WITHOUT ESOPHAGITIS: Primary | ICD-10-CM

## 2023-03-23 PROCEDURE — G8428 CUR MEDS NOT DOCUMENT: HCPCS | Performed by: INTERNAL MEDICINE

## 2023-03-23 PROCEDURE — 3078F DIAST BP <80 MM HG: CPT | Performed by: INTERNAL MEDICINE

## 2023-03-23 PROCEDURE — 99213 OFFICE O/P EST LOW 20 MIN: CPT | Performed by: INTERNAL MEDICINE

## 2023-03-23 PROCEDURE — 1036F TOBACCO NON-USER: CPT | Performed by: INTERNAL MEDICINE

## 2023-03-23 PROCEDURE — G8482 FLU IMMUNIZE ORDER/ADMIN: HCPCS | Performed by: INTERNAL MEDICINE

## 2023-03-23 PROCEDURE — 3075F SYST BP GE 130 - 139MM HG: CPT | Performed by: INTERNAL MEDICINE

## 2023-03-23 PROCEDURE — G8417 CALC BMI ABV UP PARAM F/U: HCPCS | Performed by: INTERNAL MEDICINE

## 2023-03-23 PROCEDURE — 3017F COLORECTAL CA SCREEN DOC REV: CPT | Performed by: INTERNAL MEDICINE

## 2023-03-23 RX ORDER — AMLODIPINE BESYLATE 5 MG/1
5 TABLET ORAL DAILY
COMMUNITY
Start: 2023-03-17

## 2023-03-23 RX ORDER — OMEPRAZOLE 20 MG/1
20 CAPSULE, DELAYED RELEASE ORAL 2 TIMES DAILY
Qty: 60 CAPSULE | Refills: 3 | Status: SHIPPED | OUTPATIENT
Start: 2023-03-23

## 2023-03-23 ASSESSMENT — ENCOUNTER SYMPTOMS
SORE THROAT: 0
TROUBLE SWALLOWING: 0
VOICE CHANGE: 0
VOMITING: 0
DIARRHEA: 0
WHEEZING: 0
CHOKING: 0
ABDOMINAL PAIN: 0
BLOOD IN STOOL: 0
ANAL BLEEDING: 0
COUGH: 0
ABDOMINAL DISTENTION: 0
RECTAL PAIN: 0
CONSTIPATION: 1
NAUSEA: 0
SHORTNESS OF BREATH: 0

## 2023-03-23 NOTE — PROGRESS NOTES
obtained and pertinent positives and negatives were listed below. REVIEW OF SYSTEMS:     Constitutional: No fever, no chills, no lethargy, no weakness. HEENT:  No headache, otalgia, itchy eyes, nasal discharge or sore throat. Cardiac:  No chest pain, dyspnea, orthopnea or PND. Chest:   No cough, phlegm or wheezing. Abdomen:      Detailed by MA   Neuro:  No focal weakness, abnormal movements or seizure like activity. Skin:   No rashes, no itching. :   No hematuria, no pyuria, no dysuria, no flank pain. Extremities:  No swelling or joint pains. ROS was otherwise negative    Review of Systems   Constitutional:  Negative for appetite change, fatigue and unexpected weight change. HENT:  Negative for sore throat, trouble swallowing and voice change. Eyes:  Negative for visual disturbance. Respiratory:  Negative for cough, choking, shortness of breath and wheezing. Cardiovascular:  Negative for chest pain, palpitations and leg swelling. Gastrointestinal:  Positive for constipation. Negative for abdominal distention, abdominal pain, anal bleeding, blood in stool, diarrhea, nausea, rectal pain and vomiting. Genitourinary:  Negative for difficulty urinating. Neurological:  Positive for dizziness (when food is stuck in throat). Negative for seizures, weakness, numbness and headaches. Hematological:  Does not bruise/bleed easily. Psychiatric/Behavioral:  Negative for confusion and sleep disturbance. The patient is not nervous/anxious. PHYSICAL EXAMINATION: Vital signs reviewed per the nursing documentation. /78 (Site: Left Lower Arm, Position: Sitting, Cuff Size: Small Adult)   Ht 5' 4\" (1.626 m)   Wt 176 lb (79.8 kg)   BMI 30.21 kg/m²   Body mass index is 30.21 kg/m². Physical Exam    Physical Exam   Constitutional: Patient is oriented to person, place, and time. Patient appears well-developed and well-nourished. HENT:   Head: Normocephalic and atraumatic.    Eyes: Pupils

## 2023-03-29 ENCOUNTER — HOSPITAL ENCOUNTER (EMERGENCY)
Age: 63
Discharge: HOME | End: 2023-03-29
Attending: EMERGENCY MEDICINE
Payer: COMMERCIAL

## 2023-03-29 ENCOUNTER — NURSE TRIAGE (OUTPATIENT)
Dept: OTHER | Facility: CLINIC | Age: 63
End: 2023-03-29

## 2023-03-29 VITALS
HEART RATE: 81 BPM | WEIGHT: 175 LBS | HEIGHT: 64 IN | TEMPERATURE: 97.7 F | SYSTOLIC BLOOD PRESSURE: 189 MMHG | OXYGEN SATURATION: 97 % | RESPIRATION RATE: 16 BRPM | BODY MASS INDEX: 29.88 KG/M2 | DIASTOLIC BLOOD PRESSURE: 102 MMHG

## 2023-03-29 DIAGNOSIS — R51.9 FACIAL PAIN: ICD-10-CM

## 2023-03-29 DIAGNOSIS — R22.0 FACIAL SWELLING: Primary | ICD-10-CM

## 2023-03-29 PROCEDURE — 99282 EMERGENCY DEPT VISIT SF MDM: CPT

## 2023-03-29 PROCEDURE — 82947 ASSAY GLUCOSE BLOOD QUANT: CPT

## 2023-03-29 ASSESSMENT — ENCOUNTER SYMPTOMS
ABDOMINAL PAIN: 0
DIARRHEA: 0
BACK PAIN: 0
SORE THROAT: 0
EYE PAIN: 0
COUGH: 0
FACIAL SWELLING: 1
EYE REDNESS: 0
VOMITING: 0
SHORTNESS OF BREATH: 0

## 2023-03-29 ASSESSMENT — LIFESTYLE VARIABLES
HOW OFTEN DO YOU HAVE A DRINK CONTAINING ALCOHOL: NEVER
HOW MANY STANDARD DRINKS CONTAINING ALCOHOL DO YOU HAVE ON A TYPICAL DAY: PATIENT DOES NOT DRINK

## 2023-03-29 NOTE — ED PROVIDER NOTES
Years of education: None    Highest education level: None   Tobacco Use    Smoking status: Former     Packs/day: 1.00     Years: 30.00     Pack years: 30.00     Types: Cigarettes     Quit date: 2022     Years since quittin.6    Smokeless tobacco: Never    Tobacco comments:     I quit smoking 2022   Vaping Use    Vaping Use: Never used   Substance and Sexual Activity    Alcohol use: Not Currently     Comment: I quit drinking alcohol 5+yrs ago when I quit opiods    Drug use: Yes     Frequency: 4.0 times per week     Types: Marijuana [place])     Comment: Gummies    Sexual activity: Not Currently     Partners: Male     Social Determinants of Health     Financial Resource Strain: Low Risk     Difficulty of Paying Living Expenses: Not hard at all   Food Insecurity: No Food Insecurity    Worried About Running Out of Food in the Last Year: Never true    Ran Out of Food in the Last Year: Never true   Physical Activity: Insufficiently Active    Days of Exercise per Week: 2 days    Minutes of Exercise per Session: 30 min   Intimate Partner Violence: Not At Risk    Fear of Current or Ex-Partner: No    Emotionally Abused: No    Physically Abused: No    Sexually Abused: No       REVIEW OF SYSTEMS     Review of Systems   Constitutional:  Negative for fever. HENT:  Positive for facial swelling. Negative for ear pain and sore throat. Eyes:  Negative for pain and redness. Respiratory:  Negative for cough and shortness of breath. Cardiovascular:  Negative for chest pain. Gastrointestinal:  Negative for abdominal pain, diarrhea and vomiting. Genitourinary:  Negative for dysuria and frequency. Musculoskeletal:  Negative for arthralgias and back pain. Neurological:  Negative for dizziness and weakness. Except as noted above the remainder of the review of systems was reviewed and is negative.   SCREENINGS        [place] Coma Scale  Eye Opening: Spontaneous  Best Verbal Response: Oriented  Best Motor available at the time of this note:  No orders to display       LABS:  Labs Reviewed - No data to display    All other labs were within normal range or not returned as of this dictation. Please note, any cultures that may have been sent were not resulted at the time of this patient visit. EMERGENCY DEPARTMENT COURSE and Medical Decision Making:     Vitals:    Vitals:    03/29/23 1251   BP: (!) 189/102   Pulse: 81   Resp: 16   Temp: 97.7 °F (36.5 °C)   TempSrc: Oral   SpO2: 97%   Weight: 175 lb (79.4 kg)   Height: 5' 4\" (1.626 m)       PROCEDURES: (None if blank)  Procedures       MDM  Number of Diagnoses or Management Options  Facial pain  Facial swelling  Diagnosis management comments: Patient could have symptoms consistent with early shingles with pins-and-needles to the left side of her face in a dermatomal pattern. This also could be from things such as salivary stone. Advised patient to follow-up closely with her PCP. She has an appointment next week. I do not feel like these are stroke symptoms. Patient has no weakness to her face arms or legs. She denies any numbness in a strokelike pattern. She also reports a painful process. Strict return precautions and follow up instructions were discussed with the patient with which the patient agrees    ED Medications administered this visit:  Medications - No data to display      FINAL IMPRESSION      1. Facial swelling    2.  Facial pain          DISPOSITION/PLAN   DISPOSITION Decision To Discharge 03/29/2023 01:23:04 PM      PATIENT REFERRED TO:  Sherry Walsh, DEMOND - [facility]  [address] [address]  [phone]    Schedule an appointment as soon as possible for a visit       DISCHARGE MEDICATIONS:  Discharge Medication List as of 3/29/2023  1:35 PM                 Stacia Benitez DO (electronically signed)  Attending Physician, Emergency Department         Stacia Benitez DO  03/29/23 6808

## 2023-03-29 NOTE — DISCHARGE INSTRUCTIONS
Your symptoms could be secondary to salivary stone or early shingles. You can also try decongestants. Please follow up with PCP. Seek earlier care if rash develops. If you have numbness/weakness to arms or legs seek earlier care. You can try to take motrin for pain and swelling. Follow instructions on the bottle.

## 2023-03-30 ENCOUNTER — HOSPITAL ENCOUNTER (OUTPATIENT)
Age: 63
Setting detail: SPECIMEN
Discharge: HOME | End: 2023-03-30

## 2023-03-30 DIAGNOSIS — N18.32 STAGE 3B CHRONIC KIDNEY DISEASE (HCC): ICD-10-CM

## 2023-03-30 DIAGNOSIS — I10 ESSENTIAL HYPERTENSION: ICD-10-CM

## 2023-03-30 DIAGNOSIS — N18.30 BENIGN HYPERTENSION WITH CKD (CHRONIC KIDNEY DISEASE) STAGE III (HCC): ICD-10-CM

## 2023-03-30 DIAGNOSIS — E83.52 HYPERCALCEMIA: ICD-10-CM

## 2023-03-30 DIAGNOSIS — I12.9 BENIGN HYPERTENSION WITH CKD (CHRONIC KIDNEY DISEASE) STAGE III (HCC): ICD-10-CM

## 2023-03-30 LAB
BILIRUBIN URINE: NEGATIVE
COLOR: YELLOW
CREATININE URINE: 34 MG/DL (ref 28–217)
EPITHELIAL CELLS UA: ABNORMAL /HPF (ref 0–5)
GLUCOSE BLD-MCNC: 96 MG/DL (ref 65–105)
GLUCOSE UR STRIP.AUTO-MCNC: NEGATIVE MG/DL
HCT VFR BLD AUTO: 40.3 % (ref 36.3–47.1)
HGB BLD-MCNC: 12.6 G/DL (ref 11.9–15.1)
KETONES UR STRIP.AUTO-MCNC: NEGATIVE MG/DL
LEUKOCYTE ESTERASE UR QL STRIP.AUTO: ABNORMAL
MICROALBUMIN/CREAT 24H UR: <12 MG/L
MICROALBUMIN/CREAT UR-RTO: NORMAL MCG/MG CREAT
NITRITE UR QL STRIP.AUTO: NEGATIVE
PROT UR STRIP.AUTO-MCNC: 6.5 MG/DL (ref 5–8)
PROT UR STRIP.AUTO-MCNC: NEGATIVE MG/DL
RBC CLUMPS #/AREA URNS AUTO: ABNORMAL /HPF (ref 0–4)
SPECIFIC GRAVITY UA: 1.01 (ref 1–1.03)
TURBIDITY: CLEAR
URINE HGB: NEGATIVE
UROBILINOGEN, URINE: NORMAL
WBC UA: ABNORMAL /HPF (ref 0–5)

## 2023-03-31 LAB
25(OH)D3 SERPL-MCNC: 47 NG/ML
ANION GAP SERPL CALCULATED.3IONS-SCNC: 16 MMOL/L (ref 9–17)
BUN SERPL-MCNC: 25 MG/DL (ref 8–23)
CALCIUM SERPL-MCNC: 10.2 MG/DL (ref 8.6–10.4)
CHLORIDE SERPL-SCNC: 106 MMOL/L (ref 98–107)
CO2 SERPL-SCNC: 21 MMOL/L (ref 20–31)
CREAT SERPL-MCNC: 1.11 MG/DL (ref 0.5–0.9)
GFR SERPL CREATININE-BSD FRML MDRD: 56 ML/MIN/1.73M2
MAGNESIUM SERPL-MCNC: 1.8 MG/DL (ref 1.6–2.6)
PHOSPHATE SERPL-MCNC: 3.3 MG/DL (ref 2.6–4.5)
POTASSIUM SERPL-SCNC: 4.7 MMOL/L (ref 3.7–5.3)
SODIUM SERPL-SCNC: 143 MMOL/L (ref 135–144)

## 2023-09-11 RX ORDER — OMEPRAZOLE 20 MG/1
20 CAPSULE, DELAYED RELEASE ORAL 2 TIMES DAILY
Qty: 60 CAPSULE | Refills: 3 | Status: SHIPPED | OUTPATIENT
Start: 2023-09-11

## 2023-10-12 ENCOUNTER — HOSPITAL ENCOUNTER (OUTPATIENT)
Dept: WOMENS IMAGING | Age: 63
Discharge: HOME | End: 2023-10-14
Payer: COMMERCIAL

## 2023-10-12 DIAGNOSIS — Z12.31 VISIT FOR SCREENING MAMMOGRAM: ICD-10-CM

## 2023-10-12 PROCEDURE — 77063 BREAST TOMOSYNTHESIS BI: CPT

## 2023-12-15 ENCOUNTER — HOSPITAL ENCOUNTER (OUTPATIENT)
Age: 63
Setting detail: SPECIMEN
Discharge: HOME | End: 2023-12-15

## 2023-12-15 DIAGNOSIS — E78.2 MIXED HYPERLIPIDEMIA: ICD-10-CM

## 2023-12-15 DIAGNOSIS — I12.9 BENIGN HYPERTENSION WITH CKD (CHRONIC KIDNEY DISEASE) STAGE III (HCC): ICD-10-CM

## 2023-12-15 DIAGNOSIS — N18.32 STAGE 3B CHRONIC KIDNEY DISEASE (HCC): ICD-10-CM

## 2023-12-15 DIAGNOSIS — N18.30 BENIGN HYPERTENSION WITH CKD (CHRONIC KIDNEY DISEASE) STAGE III (HCC): ICD-10-CM

## 2023-12-15 DIAGNOSIS — Z13.21 ENCOUNTER FOR VITAMIN DEFICIENCY SCREENING: ICD-10-CM

## 2023-12-15 LAB
BACTERIA URNS QL MICRO: ABNORMAL
BILIRUB UR QL STRIP: NEGATIVE
CASTS #/AREA URNS LPF: ABNORMAL /LPF (ref 0–8)
CLARITY UR: CLEAR
COLOR UR: YELLOW
CREAT UR-MCNC: 145.7 MG/DL (ref 28–217)
EPI CELLS #/AREA URNS HPF: ABNORMAL /HPF (ref 0–5)
GLUCOSE UR STRIP-MCNC: NEGATIVE MG/DL
HCT VFR BLD AUTO: 41.6 % (ref 36.3–47.1)
HGB BLD-MCNC: 13.2 G/DL (ref 11.9–15.1)
HGB UR QL STRIP.AUTO: NEGATIVE
KETONES UR STRIP-MCNC: NEGATIVE MG/DL
LEUKOCYTE ESTERASE UR QL STRIP: ABNORMAL
MICROALBUMIN UR-MCNC: 26 MG/L
MICROALBUMIN/CREAT UR-RTO: 18 MCG/MG CREAT
NITRITE UR QL STRIP: NEGATIVE
PH UR STRIP: 5.5 [PH] (ref 5–8)
PROT UR STRIP-MCNC: NEGATIVE MG/DL
RBC #/AREA URNS HPF: ABNORMAL /HPF (ref 0–4)
SP GR UR STRIP: 1.02 (ref 1–1.03)
UROBILINOGEN UR STRIP-ACNC: NORMAL EU/DL (ref 0–1)
WBC #/AREA URNS HPF: ABNORMAL /HPF (ref 0–5)

## 2023-12-16 LAB
25(OH)D3 SERPL-MCNC: 68.3 NG/ML
ANION GAP SERPL CALCULATED.3IONS-SCNC: 15 MMOL/L (ref 9–17)
BUN SERPL-MCNC: 26 MG/DL (ref 8–23)
CALCIUM SERPL-MCNC: 9.5 MG/DL (ref 8.6–10.4)
CHLORIDE SERPL-SCNC: 105 MMOL/L (ref 98–107)
CHOLEST SERPL-MCNC: 144 MG/DL
CHOLESTEROL/HDL RATIO: 3.1
CO2 SERPL-SCNC: 20 MMOL/L (ref 20–31)
CREAT SERPL-MCNC: 1 MG/DL (ref 0.5–0.9)
GFR SERPL CREATININE-BSD FRML MDRD: >60 ML/MIN/1.73M2
HDLC SERPL-MCNC: 47 MG/DL
LDLC SERPL CALC-MCNC: 73 MG/DL (ref 0–130)
MAGNESIUM SERPL-MCNC: 2 MG/DL (ref 1.6–2.6)
PHOSPHATE SERPL-MCNC: 2.9 MG/DL (ref 2.6–4.5)
POTASSIUM SERPL-SCNC: 4.6 MMOL/L (ref 3.7–5.3)
SODIUM SERPL-SCNC: 140 MMOL/L (ref 135–144)
TRIGL SERPL-MCNC: 122 MG/DL

## 2023-12-20 PROBLEM — I05.9 MITRAL VALVE DISORDER: Status: ACTIVE | Noted: 2023-12-20

## 2024-01-17 ENCOUNTER — OFFICE VISIT (OUTPATIENT)
Dept: INFECTIOUS DISEASES | Age: 64
End: 2024-01-17
Payer: MEDICARE

## 2024-01-17 VITALS
WEIGHT: 175 LBS | BODY MASS INDEX: 29.88 KG/M2 | TEMPERATURE: 97.9 F | DIASTOLIC BLOOD PRESSURE: 80 MMHG | HEIGHT: 64 IN | SYSTOLIC BLOOD PRESSURE: 150 MMHG

## 2024-01-17 DIAGNOSIS — B00.9 RECURRENT HERPES SIMPLEX: Primary | ICD-10-CM

## 2024-01-17 PROCEDURE — 1123F ACP DISCUSS/DSCN MKR DOCD: CPT | Performed by: INTERNAL MEDICINE

## 2024-01-17 PROCEDURE — 3077F SYST BP >= 140 MM HG: CPT | Performed by: INTERNAL MEDICINE

## 2024-01-17 PROCEDURE — 99214 OFFICE O/P EST MOD 30 MIN: CPT | Performed by: INTERNAL MEDICINE

## 2024-01-17 PROCEDURE — 3079F DIAST BP 80-89 MM HG: CPT | Performed by: INTERNAL MEDICINE

## 2024-01-17 RX ORDER — ACYCLOVIR 400 MG/1
400 TABLET ORAL
Qty: 150 TABLET | Refills: 11 | Status: SHIPPED | OUTPATIENT
Start: 2024-01-17 | End: 2024-02-16

## 2024-01-17 ASSESSMENT — ENCOUNTER SYMPTOMS
EYE DISCHARGE: 0
ABDOMINAL DISTENTION: 0
APNEA: 0
COLOR CHANGE: 0

## 2024-01-17 NOTE — PROGRESS NOTES
[facility] - Initial Consult Note  Today's Date: 1/17/2024    Impression :   Referred for VZV new pt 1/17/24 for recurrent Zoster rash  Left scalp shingles 12/2023 and 11/2023 - 2 attacks vs a prolonged one ?? 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 - takes valtrex as needed  Past drug uses no iv - stopped 2017 -- no hep C or hep B - uses cannabis drops -  Healthy and no wot loss - CT chest no large LN - clinically no large LN or organs -no night sweats but the menopause sweats -  Aches all over x many years  Poor drive x many years - hence not new   Had the shingles vaccine 2018  Never had HSV 1 that she knows of  1/17/24 no lesions of herpes at this time and scalp clear  CKD stage 2 - creat 1 - Joel  HTN  Depression - GERD  Fam hx breast cancer  Pt has R invaginated nipple - old and same -   1/17/24 breast exam neg - no + LN  Sciatica x years  No etoh - stop cig 2018  3 mm LLL pulm nodule on CT 2/2023 -  FU w serial CTs w  PCP  Hysterectomy in the 30s and since fatigue and and aches and no drive      Recommendations   Get the new shingles vaccine for better immunity and less recurrences  I kind of doubt the double shingles - I suspect the buttock lesions used to be HSV2  Agrees to get daily acyclovir 200 mg x long term  - drinks lots of fluids w it  If blisters on the scalp or buttock, asking primary to unroof and take a viral, on a viral media and send for ID     Diagnosis Orders   1. Recurrent herpes simplex  acyclovir (ZOVIRAX) 400 MG tablet          Return in about 3 months (around 4/17/2024).      History of Present Illness:   Cynthia J Cantua is a 64 y.o.-year-old  female who presents with   Chief Complaint   Patient presents with    Frequent Infections     NEW PATENT    Visit 1/17/24 new pt  Referred for VZV new pt 1/17/24 for

## 2024-01-22 SDOH — HEALTH STABILITY: PHYSICAL HEALTH: ON AVERAGE, HOW MANY MINUTES DO YOU ENGAGE IN EXERCISE AT THIS LEVEL?: 40 MIN

## 2024-01-22 SDOH — HEALTH STABILITY: PHYSICAL HEALTH: ON AVERAGE, HOW MANY DAYS PER WEEK DO YOU ENGAGE IN MODERATE TO STRENUOUS EXERCISE (LIKE A BRISK WALK)?: 4 DAYS

## 2024-01-23 ENCOUNTER — OFFICE VISIT (OUTPATIENT)
Dept: ORTHOPEDIC SURGERY | Age: 64
End: 2024-01-23
Payer: MEDICARE

## 2024-01-23 VITALS — WEIGHT: 175.04 LBS | RESPIRATION RATE: 14 BRPM | HEIGHT: 64 IN | BODY MASS INDEX: 29.88 KG/M2

## 2024-01-23 DIAGNOSIS — M54.41 CHRONIC RIGHT-SIDED LOW BACK PAIN WITH RIGHT-SIDED SCIATICA: Primary | ICD-10-CM

## 2024-01-23 DIAGNOSIS — G89.29 CHRONIC RIGHT-SIDED LOW BACK PAIN WITH RIGHT-SIDED SCIATICA: Primary | ICD-10-CM

## 2024-01-23 PROCEDURE — 1123F ACP DISCUSS/DSCN MKR DOCD: CPT | Performed by: ORTHOPAEDIC SURGERY

## 2024-01-23 PROCEDURE — 99203 OFFICE O/P NEW LOW 30 MIN: CPT | Performed by: ORTHOPAEDIC SURGERY

## 2024-01-23 NOTE — PROGRESS NOTES
Patient ID: Cynthia Cantua is a 64 y.o. female    Chief Compliant:  Chief Complaint   Patient presents with    Lower Back Pain        Diagnostic imaging:    AP lateral lumbar spine largely age-appropriate normal hips no acute fractures age-appropriate spondylosis    Assessment and Plan:  1. Chronic right-sided low back pain with right-sided sciatica        Patient with chronic low back right radicular leg pain into predominantly the buttock patient with a history of severe sciatica with all 3 of her children this was over 20 years ago.    Patient started walking and her neurogenic claudication and buttock pain worsened recently this year    PT lumbar spine    Follow up 6 weeks    HPI:  This is a 64 y.o. female who presents to the clinic today as a new patient for low back evaluation.     Patient complains of right sided low back pain that radiates into her right buttock that has been ongoing for over 20 years.     She reports that she was bed ridden with sciatic pain during all three of her pregnancies.     Recently she re-aggravated this with walking and notes that this is interfering with her daily life and she is not able to walk as long as she used to.     Review of Systems   All other systems reviewed and are negative.      Past History:    Current Outpatient Medications:     acyclovir (ZOVIRAX) 400 MG tablet, Take 1 tablet by mouth 5 times daily, Disp: 150 tablet, Rfl: 11    hydrALAZINE (APRESOLINE) 50 MG tablet, take 1 tablet by mouth three times a day, Disp: 270 tablet, Rfl: 1    gabapentin (NEURONTIN) 100 MG capsule, Take 1 capsule by mouth 2 times daily for 60 days. Intended supply: 30 days, Disp: 60 capsule, Rfl: 1    amLODIPine (NORVASC) 10 MG tablet, Take 1 tablet by mouth daily, Disp: 90 tablet, Rfl: 1    omeprazole (PRILOSEC) 20 MG delayed release capsule, Take 1 capsule by mouth 2 times daily, Disp: 60 capsule, Rfl: 3    Elastic Bandages & Supports (LUMBAR BACK BRACE/SUPPORT PAD) MISC, 1 each by Does

## 2024-02-05 ENCOUNTER — TELEPHONE (OUTPATIENT)
Dept: ONCOLOGY | Age: 64
End: 2024-02-05

## 2024-02-12 ENCOUNTER — HOSPITAL ENCOUNTER (OUTPATIENT)
Dept: CT IMAGING | Age: 64
Discharge: HOME | End: 2024-02-14
Payer: MEDICARE

## 2024-02-12 VITALS — BODY MASS INDEX: 29.88 KG/M2 | WEIGHT: 175 LBS | HEIGHT: 64 IN

## 2024-02-12 DIAGNOSIS — Z87.891 FORMER SMOKER: ICD-10-CM

## 2024-02-12 PROCEDURE — 71271 CT THORAX LUNG CANCER SCR C-: CPT

## 2024-03-19 ENCOUNTER — OFFICE VISIT (OUTPATIENT)
Dept: ORTHOPEDIC SURGERY | Age: 64
End: 2024-03-19

## 2024-03-19 VITALS — BODY MASS INDEX: 29.88 KG/M2 | HEIGHT: 64 IN | WEIGHT: 175 LBS | RESPIRATION RATE: 14 BRPM

## 2024-03-19 DIAGNOSIS — M72.2 PLANTAR FASCIITIS OF RIGHT FOOT: ICD-10-CM

## 2024-03-19 DIAGNOSIS — M54.41 CHRONIC RIGHT-SIDED LOW BACK PAIN WITH RIGHT-SIDED SCIATICA: Primary | ICD-10-CM

## 2024-03-19 DIAGNOSIS — G89.29 CHRONIC RIGHT-SIDED LOW BACK PAIN WITH RIGHT-SIDED SCIATICA: Primary | ICD-10-CM

## 2024-03-19 RX ORDER — BETAMETHASONE SODIUM PHOSPHATE AND BETAMETHASONE ACETATE 3; 3 MG/ML; MG/ML
12 INJECTION, SUSPENSION INTRA-ARTICULAR; INTRALESIONAL; INTRAMUSCULAR; SOFT TISSUE ONCE
Status: COMPLETED | OUTPATIENT
Start: 2024-03-19 | End: 2024-03-19

## 2024-03-19 RX ORDER — LIDOCAINE HYDROCHLORIDE 10 MG/ML
2 INJECTION, SOLUTION INFILTRATION; PERINEURAL ONCE
Status: COMPLETED | OUTPATIENT
Start: 2024-03-19 | End: 2024-03-19

## 2024-03-19 RX ADMIN — LIDOCAINE HYDROCHLORIDE 2 ML: 10 INJECTION, SOLUTION INFILTRATION; PERINEURAL at 08:47

## 2024-03-19 RX ADMIN — BETAMETHASONE SODIUM PHOSPHATE AND BETAMETHASONE ACETATE 12 MG: 3; 3 INJECTION, SUSPENSION INTRA-ARTICULAR; INTRALESIONAL; INTRAMUSCULAR; SOFT TISSUE at 08:46

## 2024-03-19 NOTE — PROGRESS NOTES
Patient ID: Cynthia Cantua is a 64 y.o. female    Chief Compliant:  Chief Complaint   Patient presents with    Lower Back Pain        Diagnostic imaging:        Assessment and Plan:  1. Chronic right-sided low back pain with right-sided sciatica    2. Plantar fasciitis of right foot        Chronic low back pain with right radicular leg pain into the buttock    Patient has been through physical therapy with aggravation of her pain.    Patient with new complaint of rather severe plantar fasciitis of the right foot    Ordered a splint for the patient to wear on her foot at night and advised patient to stretch her calves and hamstrings regularly, injection provided    MRI lumbar spine    Follow up after imaging    Procedure Note: right  heel plantar fasciitis injection  Celestone Injection   An informed verbal consent for the procedure was obtained and risks including, but not limited to: allergy to medications, injection, bleeding, stiffness of joint, recurrence of symptoms, loss of function, swelling, drainage, irrigation, need for surgery and pseudo-septic inflammation, were explained to the patient. Also, discussed was the potential for further injections, irrigation and debridement and surgery. Alternate means of treatment have also been discussed with the patient.    Following an appropriate discussion with the patient regarding the risks and benefits of the procedure she consented to proceed. her right  plantar fascia  was prepped using alcohol swab. Using aseptic technique and through a plantar approach, her right  plantar fascia  was injected superficially with 6 cc mixture of 2 cc Lidocaine without epi, 2 cc of Marcaine and 2 cc of 6 mg/mL Celestone into the right  plantar fascia .A band aid was applied to the injection site. she tolerated the injection with no immediate adverse reactions.      HPI:  This is a 64 y.o. female who presents to the clinic today for chronic low back pain with right radicular leg

## 2024-03-26 ENCOUNTER — HOSPITAL ENCOUNTER (OUTPATIENT)
Dept: MRI IMAGING | Age: 64
Discharge: HOME | End: 2024-03-28
Attending: ORTHOPAEDIC SURGERY
Payer: MEDICARE

## 2024-03-26 DIAGNOSIS — G89.29 CHRONIC RIGHT-SIDED LOW BACK PAIN WITH RIGHT-SIDED SCIATICA: ICD-10-CM

## 2024-03-26 DIAGNOSIS — M54.41 CHRONIC RIGHT-SIDED LOW BACK PAIN WITH RIGHT-SIDED SCIATICA: ICD-10-CM

## 2024-03-26 PROCEDURE — 72148 MRI LUMBAR SPINE W/O DYE: CPT

## 2024-04-09 ENCOUNTER — OFFICE VISIT (OUTPATIENT)
Dept: ORTHOPEDIC SURGERY | Age: 64
End: 2024-04-09
Payer: COMMERCIAL

## 2024-04-09 VITALS — HEIGHT: 64 IN | BODY MASS INDEX: 29.88 KG/M2 | RESPIRATION RATE: 14 BRPM | WEIGHT: 175.04 LBS

## 2024-04-09 DIAGNOSIS — M72.2 PLANTAR FASCIITIS OF RIGHT FOOT: ICD-10-CM

## 2024-04-09 DIAGNOSIS — M54.41 CHRONIC RIGHT-SIDED LOW BACK PAIN WITH RIGHT-SIDED SCIATICA: Primary | ICD-10-CM

## 2024-04-09 DIAGNOSIS — G89.29 CHRONIC RIGHT-SIDED LOW BACK PAIN WITH RIGHT-SIDED SCIATICA: Primary | ICD-10-CM

## 2024-04-09 PROCEDURE — 3017F COLORECTAL CA SCREEN DOC REV: CPT | Performed by: ORTHOPAEDIC SURGERY

## 2024-04-09 PROCEDURE — G8400 PT W/DXA NO RESULTS DOC: HCPCS | Performed by: ORTHOPAEDIC SURGERY

## 2024-04-09 PROCEDURE — 1036F TOBACCO NON-USER: CPT | Performed by: ORTHOPAEDIC SURGERY

## 2024-04-09 PROCEDURE — G8417 CALC BMI ABV UP PARAM F/U: HCPCS | Performed by: ORTHOPAEDIC SURGERY

## 2024-04-09 PROCEDURE — 1090F PRES/ABSN URINE INCON ASSESS: CPT | Performed by: ORTHOPAEDIC SURGERY

## 2024-04-09 PROCEDURE — 99213 OFFICE O/P EST LOW 20 MIN: CPT | Performed by: ORTHOPAEDIC SURGERY

## 2024-04-09 PROCEDURE — 1123F ACP DISCUSS/DSCN MKR DOCD: CPT | Performed by: ORTHOPAEDIC SURGERY

## 2024-04-09 PROCEDURE — G8427 DOCREV CUR MEDS BY ELIG CLIN: HCPCS | Performed by: ORTHOPAEDIC SURGERY

## 2024-04-09 NOTE — PROGRESS NOTES
Patient ID: Cynthia Cantua is a 64 y.o. female    Chief Compliant:  Chief Complaint   Patient presents with    Lower Back Pain     MRI lumbar review        Diagnostic imaging:    MRI lumbar spine is reviewed age-appropriate surprisingly little degenerative changes and nerve impingement actually no nerve impingement noted    Assessment and Plan:  1. Chronic right-sided low back pain with right-sided sciatica    2. Plantar fasciitis of right foot        Chronic right sided low back pain with right radicular leg pain into the buttock    Pain management for right SI joint injection versus spinal injection    Severe plantar fasciitis of right foot, significant improvement post injection    Follow up 10 weeks    HPI:  This is a 64 y.o. female who presents to the clinic today for MRI results.     Patient complains of chronic low back pain with right radicular leg pain into the buttock.     Patient also had complains of severe plantar fasciitis of her right foot. Patient was provided a plantar fasciitis injection at last visit with drastic improvement.     Review of Systems   All other systems reviewed and are negative.      Past History:    Current Outpatient Medications:     gabapentin (NEURONTIN) 100 MG capsule, Take 1 capsule by mouth 2 times daily for 180 days., Disp: 60 capsule, Rfl: 5    hydrALAZINE (APRESOLINE) 50 MG tablet, take 1 tablet by mouth three times a day, Disp: 270 tablet, Rfl: 1    amLODIPine (NORVASC) 10 MG tablet, Take 1 tablet by mouth daily, Disp: 90 tablet, Rfl: 1    omeprazole (PRILOSEC) 20 MG delayed release capsule, Take 1 capsule by mouth 2 times daily, Disp: 60 capsule, Rfl: 3    Elastic Bandages & Supports (LUMBAR BACK BRACE/SUPPORT PAD) MISC, 1 each by Does not apply route daily, Disp: 1 each, Rfl: 0    tiZANidine (ZANAFLEX) 2 MG tablet, Take 1 tablet by mouth 3 times daily as needed (back pain), Disp: 30 tablet, Rfl: 0    buPROPion (WELLBUTRIN SR) 150 MG extended release tablet, take 1

## 2024-04-22 ENCOUNTER — HOSPITAL ENCOUNTER (OUTPATIENT)
Dept: PAIN MANAGEMENT | Age: 64
Discharge: HOME | End: 2024-04-22
Payer: COMMERCIAL

## 2024-04-22 VITALS — WEIGHT: 175 LBS | HEIGHT: 64 IN | BODY MASS INDEX: 29.88 KG/M2

## 2024-04-22 DIAGNOSIS — G89.29 CHRONIC RIGHT-SIDED LOW BACK PAIN WITHOUT SCIATICA: ICD-10-CM

## 2024-04-22 DIAGNOSIS — M51.36 LUMBAR DEGENERATIVE DISC DISEASE: ICD-10-CM

## 2024-04-22 DIAGNOSIS — M54.50 CHRONIC RIGHT-SIDED LOW BACK PAIN WITHOUT SCIATICA: ICD-10-CM

## 2024-04-22 DIAGNOSIS — M53.3 SACROILIAC JOINT PAIN: Primary | ICD-10-CM

## 2024-04-22 DIAGNOSIS — M47.817 LUMBOSACRAL SPONDYLOSIS WITHOUT MYELOPATHY: ICD-10-CM

## 2024-04-22 PROBLEM — M51.369 LUMBAR DEGENERATIVE DISC DISEASE: Status: ACTIVE | Noted: 2024-04-22

## 2024-04-22 PROCEDURE — 99203 OFFICE O/P NEW LOW 30 MIN: CPT

## 2024-04-22 PROCEDURE — 99204 OFFICE O/P NEW MOD 45 MIN: CPT | Performed by: STUDENT IN AN ORGANIZED HEALTH CARE EDUCATION/TRAINING PROGRAM

## 2024-04-22 ASSESSMENT — PAIN SCALES - GENERAL: PAINLEVEL_OUTOF10: 5

## 2024-04-22 NOTE — PROGRESS NOTES
plan for right sacroiliac joint injections.     Neurologically, it appears the patient has full strength and normal sensation. There is no evidence of radiculopathy or myelopathy on examination. There are no red flags in the patient's history. The patient has failed conservative measures including outpatient physical therapy, greater than 3 medications for pain relief, a self-directed therapy program, as well as activity modification all within the last 6 weeks over 3 months. The patient's pain is moderate to severe that causes functional deficit measured on pain and disability scale. The patient reports worsening quality of life.    PLAN:  Medications:    For nonopioid therapy, the following medications were prescribed:    -Continue medication prescribed by primary care physician    Opioid therapy:  -Not indicated    Interventions:  -Plan for right sacroiliac joint injection    Imaging:  -Reviewed lumbar MRI with patient in room    Behavioral Therapies:  -Continue daily stretching and home exercise program    Referrals:  -None    Follow-up Plan:  -After procedure    Patient was offered intervention where appropriate.     Multi-modal Pain Therapy:  The patient was explicitly considered for multimodal and interdisciplinary therapy. Non-opioid and non-pharmacological opportunities to enhance analgesia and quality of life have been and will continue to be pursued.    Apolinar Carreno,   Interventional Pain Management/PM&R   [facility] [place]    Orders Placed This Encounter    SI JOINT INJECTIONS / SI NERVE BLOCK     Standing Status:   Future     Standing Expiration Date:   4/22/2025     Scheduling Instructions:      Right SIJ injection

## 2024-05-20 ENCOUNTER — HOSPITAL ENCOUNTER (OUTPATIENT)
Dept: PAIN MANAGEMENT | Facility: CLINIC | Age: 64
Discharge: HOME | End: 2024-05-20

## 2024-05-30 DIAGNOSIS — Z12.11 SCREEN FOR COLON CANCER: Primary | ICD-10-CM

## 2024-05-30 NOTE — TELEPHONE ENCOUNTER
Procedure scheduled/Dr Crouch  Procedure: colon  Dx: screen  Date: 9/25/24   Time: 830am   Hospital: [facility] phone call: TBD  Bowel Prep instructions given: miralax/dulc   In office/via phone: phone   Clearance needed: none

## 2024-05-31 RX ORDER — POLYETHYLENE GLYCOL 3350 17 G/17G
POWDER, FOR SOLUTION ORAL
Qty: 238 G | Refills: 0 | Status: SHIPPED | OUTPATIENT
Start: 2024-05-31

## 2024-05-31 RX ORDER — BISACODYL 5 MG/1
TABLET, DELAYED RELEASE ORAL
Qty: 4 TABLET | Refills: 0 | Status: SHIPPED | OUTPATIENT
Start: 2024-05-31

## 2024-06-04 ENCOUNTER — HOSPITAL ENCOUNTER (OUTPATIENT)
Dept: PAIN MANAGEMENT | Facility: CLINIC | Age: 64
Discharge: HOME | End: 2024-06-04
Payer: MEDICARE

## 2024-06-04 VITALS
OXYGEN SATURATION: 96 % | HEIGHT: 64 IN | RESPIRATION RATE: 13 BRPM | TEMPERATURE: 97.7 F | SYSTOLIC BLOOD PRESSURE: 135 MMHG | DIASTOLIC BLOOD PRESSURE: 80 MMHG | HEART RATE: 85 BPM | WEIGHT: 175 LBS | BODY MASS INDEX: 29.88 KG/M2

## 2024-06-04 DIAGNOSIS — R52 PAIN MANAGEMENT: ICD-10-CM

## 2024-06-04 PROCEDURE — G0260 INJ FOR SACROILIAC JT ANESTH: HCPCS

## 2024-06-04 PROCEDURE — 99152 MOD SED SAME PHYS/QHP 5/>YRS: CPT | Performed by: STUDENT IN AN ORGANIZED HEALTH CARE EDUCATION/TRAINING PROGRAM

## 2024-06-04 PROCEDURE — 27096 INJECT SACROILIAC JOINT: CPT | Performed by: STUDENT IN AN ORGANIZED HEALTH CARE EDUCATION/TRAINING PROGRAM

## 2024-06-04 PROCEDURE — 6360000002 HC RX W HCPCS: Performed by: STUDENT IN AN ORGANIZED HEALTH CARE EDUCATION/TRAINING PROGRAM

## 2024-06-04 PROCEDURE — 2500000003 HC RX 250 WO HCPCS: Performed by: STUDENT IN AN ORGANIZED HEALTH CARE EDUCATION/TRAINING PROGRAM

## 2024-06-04 RX ORDER — TRIAMCINOLONE ACETONIDE 40 MG/ML
INJECTION, SUSPENSION INTRA-ARTICULAR; INTRAMUSCULAR
Status: COMPLETED | OUTPATIENT
Start: 2024-06-04 | End: 2024-06-04

## 2024-06-04 RX ORDER — LIDOCAINE HYDROCHLORIDE 10 MG/ML
INJECTION, SOLUTION EPIDURAL; INFILTRATION; INTRACAUDAL; PERINEURAL
Status: COMPLETED | OUTPATIENT
Start: 2024-06-04 | End: 2024-06-04

## 2024-06-04 RX ORDER — MIDAZOLAM HYDROCHLORIDE 2 MG/2ML
INJECTION, SOLUTION INTRAMUSCULAR; INTRAVENOUS
Status: COMPLETED | OUTPATIENT
Start: 2024-06-04 | End: 2024-06-04

## 2024-06-04 RX ADMIN — LIDOCAINE HYDROCHLORIDE 1 ML: 10 INJECTION, SOLUTION EPIDURAL; INFILTRATION; INTRACAUDAL at 12:33

## 2024-06-04 RX ADMIN — TRIAMCINOLONE ACETONIDE 40 MG: 40 INJECTION, SUSPENSION INTRA-ARTICULAR; INTRAMUSCULAR at 12:33

## 2024-06-04 RX ADMIN — MIDAZOLAM HYDROCHLORIDE 2 MG: 1 INJECTION, SOLUTION INTRAMUSCULAR; INTRAVENOUS at 12:31

## 2024-06-04 ASSESSMENT — PAIN - FUNCTIONAL ASSESSMENT: PAIN_FUNCTIONAL_ASSESSMENT: NONE - DENIES PAIN

## 2024-06-04 ASSESSMENT — PAIN DESCRIPTION - DESCRIPTORS: DESCRIPTORS: ACHING

## 2024-06-04 ASSESSMENT — PAIN DESCRIPTION - PAIN TYPE: TYPE: CHRONIC PAIN

## 2024-06-04 ASSESSMENT — PAIN SCALES - GENERAL: PAINLEVEL_OUTOF10: 6

## 2024-06-04 ASSESSMENT — PAIN DESCRIPTION - ORIENTATION: ORIENTATION: LOWER;RIGHT

## 2024-06-04 ASSESSMENT — PAIN DESCRIPTION - FREQUENCY: FREQUENCY: CONTINUOUS

## 2024-06-04 ASSESSMENT — PAIN DESCRIPTION - LOCATION: LOCATION: BACK

## 2024-06-04 NOTE — OP NOTE
PROCEDURE PERFORMED: Right Sacroiliac joint injection    PREOPERATIVE DIAGNOSIS: Lumbago and sacroiliac joint pain    INDICATIONS: Chronic low back pain    The patient's history and physical exam were reviewed.  The risk, benefits, and alternatives of the procedure were discussed and all questions were answered to the patient's satisfaction.  The patient agreed to proceed and written informed consent was obtained.    POSTOPERATIVE DIAGNOSIS: Same    PHYSICIAN:  Dr. Apolinar Carreno DO    ANESTHESIA:  LOCAL    ASSISTANT:  NONE    PATHOLOGY:  NONE    ESTIMATED BLOOD LOSS:  N/A    IMPLANTS:  NONE    PROCEDURE DESCRIPTION: Right sacroiliac joint injection using fluoroscopy    The patient was placed on the operative bed in prone position.  The area was prepped with  Chlorhexidine.  The area was then draped in a sterile fashion.  The targeted side of the sacroiliac joint was identified and marked under AP fluoroscopy.  The fluoroscopic beam was then obliqued until the anterior and posterior margins of the joint were aligned.  The inferior margin of the joint was identified.  A 25-gauge 3-1/2 inch Quincke needle was directed toward the identified point under fluoroscopic guidance.  The joint space was then entered and negative aspiration was confirmed. Then, after negative aspiration, the injectate was easily injected.  The injectate consisted of 40 mg triamcinolone and 1 mL lidocaine 1%.  The needle was then removed.  The needle site was dressed appropriately.    The patient was transferred to the postoperative care unit in stable condition.  Written discharge instructions were given to the patient.    COMPLICATIONS:  There were no apparent complications.  The patient tolerated the procedure well.     SEDATION NOTE:     ASA CLASSIFICATION  2  MP   CLASSIFICATION  2     Moderate intravenous conscious sedation was supervised by Dr. Carreno  The patient was independently monitored by a Registered Nurse assigned to the

## 2024-06-04 NOTE — H&P
1993     Quit date: 2022     Years since quittin.8    Smokeless tobacco: Never    Tobacco comments:     I quit smoking 2022   Vaping Use    Vaping Use: Never used   Substance and Sexual Activity    Alcohol use: Not Currently     Comment: I quit drinking alcohol 5+yrs ago when I quit opiods    Drug use: Yes     Frequency: 4.0 times per week     Types: Marijuana (Weed)     Comment: Gummies    Sexual activity: Not Currently     Partners: Male   Other Topics Concern    Not on file   Social History Narrative    Not on file     Social Determinants of Health     Financial Resource Strain: Low Risk  (2024)    Overall Financial Resource Strain (CARDIA)     Difficulty of Paying Living Expenses: Not hard at all   Food Insecurity: No Food Insecurity (2024)    Hunger Vital Sign     Worried About Running Out of Food in the Last Year: Never true     Ran Out of Food in the Last Year: Never true   Transportation Needs: Unknown (2024)    PRAPARE - Transportation     Lack of Transportation (Medical): Not on file     Lack of Transportation (Non-Medical): No   Physical Activity: Sufficiently Active (2024)    Exercise Vital Sign     Days of Exercise per Week: 4 days     Minutes of Exercise per Session: 40 min   Stress: Not on file   Social Connections: Not on file   Intimate Partner Violence: Not At Risk (2023)    Humiliation, Afraid, Rape, and Kick questionnaire     Fear of Current or Ex-Partner: No     Emotionally Abused: No     Physically Abused: No     Sexually Abused: No   Housing Stability: Unknown (2024)    Housing Stability Vital Sign     Unable to Pay for Housing in the Last Year: Not on file     Number of Places Lived in the Last Year: Not on file     Unstable Housing in the Last Year: No       Medications & Allergies:   Current Outpatient Medications   Medication Instructions    amLODIPine (NORVASC) 10 mg, Oral, DAILY    aspirin 81 MG chewable tablet aspirin 81 mg chewable

## 2024-06-18 ENCOUNTER — OFFICE VISIT (OUTPATIENT)
Dept: PODIATRY | Age: 64
End: 2024-06-18
Payer: MEDICARE

## 2024-06-18 VITALS — BODY MASS INDEX: 29.88 KG/M2 | HEIGHT: 64 IN | WEIGHT: 175 LBS

## 2024-06-18 DIAGNOSIS — M72.2 PLANTAR FASCIITIS OF RIGHT FOOT: Primary | ICD-10-CM

## 2024-06-18 DIAGNOSIS — M79.671 PAIN IN RIGHT FOOT: ICD-10-CM

## 2024-06-18 PROCEDURE — G8400 PT W/DXA NO RESULTS DOC: HCPCS | Performed by: PODIATRIST

## 2024-06-18 PROCEDURE — 20550 NJX 1 TENDON SHEATH/LIGAMENT: CPT | Performed by: PODIATRIST

## 2024-06-18 PROCEDURE — G8417 CALC BMI ABV UP PARAM F/U: HCPCS | Performed by: PODIATRIST

## 2024-06-18 PROCEDURE — 1036F TOBACCO NON-USER: CPT | Performed by: PODIATRIST

## 2024-06-18 PROCEDURE — 1090F PRES/ABSN URINE INCON ASSESS: CPT | Performed by: PODIATRIST

## 2024-06-18 PROCEDURE — 1123F ACP DISCUSS/DSCN MKR DOCD: CPT | Performed by: PODIATRIST

## 2024-06-18 PROCEDURE — G8427 DOCREV CUR MEDS BY ELIG CLIN: HCPCS | Performed by: PODIATRIST

## 2024-06-18 PROCEDURE — 3017F COLORECTAL CA SCREEN DOC REV: CPT | Performed by: PODIATRIST

## 2024-06-18 PROCEDURE — 99203 OFFICE O/P NEW LOW 30 MIN: CPT | Performed by: PODIATRIST

## 2024-06-18 RX ORDER — BUPIVACAINE HYDROCHLORIDE 5 MG/ML
30 INJECTION, SOLUTION PERINEURAL ONCE
Status: COMPLETED | OUTPATIENT
Start: 2024-06-18 | End: 2024-06-18

## 2024-06-18 RX ADMIN — BUPIVACAINE HYDROCHLORIDE 150 MG: 5 INJECTION, SOLUTION PERINEURAL at 10:25

## 2024-06-18 ASSESSMENT — ENCOUNTER SYMPTOMS
DIARRHEA: 0
NAUSEA: 0
COLOR CHANGE: 0
BACK PAIN: 0
SHORTNESS OF BREATH: 0

## 2024-06-18 NOTE — PROGRESS NOTES
Cynthia J Cantua is a 64 y.o. female who presents to the office today with chief complaint of pain to the bottom of the right heel.  Chief Complaint   Patient presents with    Foot Pain     Right foot, was given injection by Dr. Saucedo, helped for a few weeks    Symptoms began about 6 month(s) ago. Patient denies injury to the right foot. Patient states that the pain is greatest when getting out of bed and after periods of rest. Pain is rated 8 out of 10 at it's worst and is described as intermittent. Treatments prior to today's visit include: Patient got an injection by an orthopedic doctor in April of this year. Patient states that this helped for a couple of weeks, but the pain has since returned.      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 I do use cannabis gummies for anxiety and pain       Prior to Admission medications    Medication Sig Start Date End Date Taking? Authorizing Provider   polyethylene glycol (GLYCOLAX) 17 GM/SCOOP powder Please follow instructions provided to you by your provider. 5/31/24  Yes Yinka Crouch MD   bisacodyl 5 MG EC tablet Please follow instructions given to you by your provider. 5/31/24  Yes Yinka Crouch MD   clobetasol (TEMOVATE) 0.05 % cream Apply topically 2 times daily. 5/29/24  Yes Tamara Muller APRN - CNP   metoprolol succinate (TOPROL XL) 100 MG extended release tablet take 1 tablet by mouth once daily 5/8/24  Yes Tamara Muller APRN - CNP   omeprazole (PRILOSEC) 20 MG delayed release capsule take 1 capsule by mouth twice a day 4/27/24  Yes Mary Jean MD

## 2024-06-26 ENCOUNTER — HOSPITAL ENCOUNTER (OUTPATIENT)
Dept: PAIN MANAGEMENT | Age: 64
Discharge: HOME | End: 2024-06-26
Payer: MEDICARE

## 2024-06-26 VITALS — WEIGHT: 175 LBS | BODY MASS INDEX: 29.88 KG/M2 | HEIGHT: 64 IN

## 2024-06-26 DIAGNOSIS — M54.16 LUMBAR RADICULOPATHY: Primary | ICD-10-CM

## 2024-06-26 DIAGNOSIS — M47.817 LUMBOSACRAL SPONDYLOSIS WITHOUT MYELOPATHY: ICD-10-CM

## 2024-06-26 DIAGNOSIS — M51.36 LUMBAR DEGENERATIVE DISC DISEASE: ICD-10-CM

## 2024-06-26 DIAGNOSIS — M53.3 SACROILIAC JOINT PAIN: ICD-10-CM

## 2024-06-26 PROCEDURE — 99214 OFFICE O/P EST MOD 30 MIN: CPT | Performed by: STUDENT IN AN ORGANIZED HEALTH CARE EDUCATION/TRAINING PROGRAM

## 2024-06-26 PROCEDURE — 99213 OFFICE O/P EST LOW 20 MIN: CPT

## 2024-06-26 ASSESSMENT — PAIN SCALES - GENERAL: PAINLEVEL_OUTOF10: 3

## 2024-06-26 NOTE — PROGRESS NOTES
Chronic Pain Clinic Note     Encounter Date: 6/26/2024     SUBJECTIVE:  Chief Complaint   Patient presents with    Back Pain       History of Present Illness:   Cynthia J Cantua is a 64 y.o. female who presents with back pain    Medication Refill: n/a    Current Complaints of Pain:   Location: back   Radiation: right leg  Severity: Moderate  Pain Numerical Score - 3 today   Average: 5     Highest: 8  Lowest: 3  Character/Quality: Complains of pain that is aching, burning, sharp  Timing: Constant  Associated symptoms: none  Numbness: no  Weakness: no  Exacerbating factors: standing sitting  Alleviating factors: laying down  Length of time pain has been present: Started about 6 months ago  Inciting event/injury: no  Bowel/Bladder incontinence: no  Falls: no  Physical Therapy: about 2/3 weeks ago at [facility]    History of Interventions:   Surgery: No previous lumbar/cervical surgeries  Injections: Yes    Imaging:    MRI LUMBAR 3/27/24    FINDINGS:  BONES/ALIGNMENT: Alignment is normal.  No acute fracture is identified.  There is mild multilevel disc desiccation.  Intervertebral disc heights are  maintained.  No spondylolysis.     SPINAL CORD: The conus medullaris is normal in size and signal intensities  and terminates normally.     SOFT TISSUES: No paraspinal mass is identified.     L1-L2: There is no disc bulge or protrusion present.  There is no significant  spinal canal stenosis or neural foraminal narrowing present.     L2-L3: A disc bulge and facet arthropathy is present.  No significant spinal  canal stenosis or neural foraminal narrowing is present.     L3-L4: A disc bulge and facet arthropathy is present.  There is mild left  neural foraminal narrowing.  No spinal canal stenosis or right neural  foraminal narrowing is present.     L4-L5: There is no disc bulge or protrusion present.  There is no significant  spinal canal stenosis or neural foraminal narrowing present.  There is  bilateral

## 2024-07-02 ENCOUNTER — OFFICE VISIT (OUTPATIENT)
Dept: PODIATRY | Age: 64
End: 2024-07-02
Payer: MEDICARE

## 2024-07-02 VITALS — WEIGHT: 175 LBS | HEIGHT: 64 IN | BODY MASS INDEX: 29.88 KG/M2

## 2024-07-02 DIAGNOSIS — M72.2 PLANTAR FASCIITIS OF RIGHT FOOT: Primary | ICD-10-CM

## 2024-07-02 DIAGNOSIS — M79.671 PAIN IN RIGHT FOOT: ICD-10-CM

## 2024-07-02 PROCEDURE — 3017F COLORECTAL CA SCREEN DOC REV: CPT | Performed by: PODIATRIST

## 2024-07-02 PROCEDURE — G8417 CALC BMI ABV UP PARAM F/U: HCPCS | Performed by: PODIATRIST

## 2024-07-02 PROCEDURE — 99213 OFFICE O/P EST LOW 20 MIN: CPT | Performed by: PODIATRIST

## 2024-07-02 PROCEDURE — 1036F TOBACCO NON-USER: CPT | Performed by: PODIATRIST

## 2024-07-02 PROCEDURE — 1123F ACP DISCUSS/DSCN MKR DOCD: CPT | Performed by: PODIATRIST

## 2024-07-02 PROCEDURE — G8400 PT W/DXA NO RESULTS DOC: HCPCS | Performed by: PODIATRIST

## 2024-07-02 PROCEDURE — 1090F PRES/ABSN URINE INCON ASSESS: CPT | Performed by: PODIATRIST

## 2024-07-02 PROCEDURE — G8427 DOCREV CUR MEDS BY ELIG CLIN: HCPCS | Performed by: PODIATRIST

## 2024-07-02 ASSESSMENT — ENCOUNTER SYMPTOMS
DIARRHEA: 0
SHORTNESS OF BREATH: 0
NAUSEA: 0
COLOR CHANGE: 0
BACK PAIN: 0

## 2024-07-02 NOTE — PROGRESS NOTES
[facility]  Return Patient Progress Note    Subjective: Cynthia J Cantua 64 y.o. female that presents for follow up evaluation of plantar fasciitis right foot.  Chief Complaint   Patient presents with    Foot Pain     Right heel injection follow up, doing a lot better    Patient's treatment thus far has included steroid injection x 1, icing and stretching.  Pain is rated 6 out of 10 and is described as intermittent.  Patient states that her pain is only at night and very infrequently.  Patient has been following my prescribed course of therapy as instructed.     Review of Systems   Constitutional:  Negative for activity change, appetite change, chills, diaphoresis, fatigue and fever.   Respiratory:  Negative for shortness of breath.    Cardiovascular:  Negative for leg swelling.   Gastrointestinal:  Negative for diarrhea and nausea.   Endocrine: Negative for cold intolerance, heat intolerance and polyuria.   Musculoskeletal:  Positive for arthralgias. Negative for back pain, gait problem, joint swelling and myalgias.   Skin:  Negative for color change, pallor, rash and wound.   Allergic/Immunologic: Negative for environmental allergies and food allergies.   Neurological:  Negative for dizziness, weakness, light-headedness and numbness.   Hematological:  Does not bruise/bleed easily.   Psychiatric/Behavioral:  Negative for behavioral problems, confusion and self-injury. The patient is not nervous/anxious.        Objective: Clinical evaluation of the patient reveals no pain today with palpation to the plantar medial calcaneal tubercle of the right foot. There is no remaining pain with palpation to the plantar central aspect of the right heel.  There is no pain with palpation to the central band of the plantar fascia of the right foot within the medial longitudinal arch.  Palpation to these areas of the right foot is much more supple today than on last visit there is no pain with palpation to the arelis

## 2024-08-06 ENCOUNTER — HOSPITAL ENCOUNTER (OUTPATIENT)
Dept: PAIN MANAGEMENT | Facility: CLINIC | Age: 64
Discharge: HOME | End: 2024-08-06
Payer: MEDICARE

## 2024-08-06 VITALS
TEMPERATURE: 98 F | SYSTOLIC BLOOD PRESSURE: 162 MMHG | WEIGHT: 175 LBS | OXYGEN SATURATION: 95 % | HEIGHT: 64 IN | DIASTOLIC BLOOD PRESSURE: 77 MMHG | RESPIRATION RATE: 8 BRPM | HEART RATE: 76 BPM | BODY MASS INDEX: 29.88 KG/M2

## 2024-08-06 DIAGNOSIS — R52 PAIN MANAGEMENT: ICD-10-CM

## 2024-08-06 PROCEDURE — 64483 NJX AA&/STRD TFRM EPI L/S 1: CPT

## 2024-08-06 PROCEDURE — 64483 NJX AA&/STRD TFRM EPI L/S 1: CPT | Performed by: STUDENT IN AN ORGANIZED HEALTH CARE EDUCATION/TRAINING PROGRAM

## 2024-08-06 PROCEDURE — 64484 NJX AA&/STRD TFRM EPI L/S EA: CPT | Performed by: STUDENT IN AN ORGANIZED HEALTH CARE EDUCATION/TRAINING PROGRAM

## 2024-08-06 PROCEDURE — 2580000003 HC RX 258: Performed by: STUDENT IN AN ORGANIZED HEALTH CARE EDUCATION/TRAINING PROGRAM

## 2024-08-06 PROCEDURE — 6360000002 HC RX W HCPCS: Performed by: STUDENT IN AN ORGANIZED HEALTH CARE EDUCATION/TRAINING PROGRAM

## 2024-08-06 PROCEDURE — 64484 NJX AA&/STRD TFRM EPI L/S EA: CPT

## 2024-08-06 PROCEDURE — 6360000004 HC RX CONTRAST MEDICATION: Performed by: STUDENT IN AN ORGANIZED HEALTH CARE EDUCATION/TRAINING PROGRAM

## 2024-08-06 PROCEDURE — 2500000003 HC RX 250 WO HCPCS: Performed by: STUDENT IN AN ORGANIZED HEALTH CARE EDUCATION/TRAINING PROGRAM

## 2024-08-06 RX ORDER — DEXAMETHASONE SODIUM PHOSPHATE 10 MG/ML
INJECTION, SOLUTION INTRAMUSCULAR; INTRAVENOUS
Status: COMPLETED | OUTPATIENT
Start: 2024-08-06 | End: 2024-08-06

## 2024-08-06 RX ORDER — LIDOCAINE HYDROCHLORIDE 10 MG/ML
INJECTION, SOLUTION EPIDURAL; INFILTRATION; INTRACAUDAL; PERINEURAL
Status: COMPLETED | OUTPATIENT
Start: 2024-08-06 | End: 2024-08-06

## 2024-08-06 RX ORDER — SODIUM CHLORIDE 0.9 % (FLUSH) 0.9 %
SYRINGE (ML) INJECTION
Status: COMPLETED | OUTPATIENT
Start: 2024-08-06 | End: 2024-08-06

## 2024-08-06 RX ADMIN — IOHEXOL 3 ML: 180 INJECTION INTRAVENOUS at 15:41

## 2024-08-06 RX ADMIN — Medication 4 ML: at 15:42

## 2024-08-06 RX ADMIN — DEXAMETHASONE SODIUM PHOSPHATE 20 MG: 10 INJECTION, SOLUTION INTRAMUSCULAR; INTRAVENOUS at 15:41

## 2024-08-06 RX ADMIN — LIDOCAINE HYDROCHLORIDE 4 ML: 10 INJECTION, SOLUTION EPIDURAL; INFILTRATION; INTRACAUDAL at 15:42

## 2024-08-06 ASSESSMENT — PAIN DESCRIPTION - DESCRIPTORS: DESCRIPTORS: ACHING

## 2024-08-06 ASSESSMENT — PAIN - FUNCTIONAL ASSESSMENT: PAIN_FUNCTIONAL_ASSESSMENT: 0-10

## 2024-08-06 NOTE — H&P
tablet every day by oral route.    atorvastatin (LIPITOR) 40 MG tablet take 1 tablet by mouth at bedtime    bisacodyl 5 MG EC tablet Please follow instructions given to you by your provider.    buPROPion (WELLBUTRIN SR) 150 MG extended release tablet take 1 tablet by mouth twice a day    Cholecalciferol (VITAMIN D) 50 MCG (2000 UT) CAPS capsule Oral    clobetasol (TEMOVATE) 0.05 % cream Apply topically 2 times daily.    Elastic Bandages & Supports (LUMBAR BACK BRACE/SUPPORT PAD) MISC 1 each, Does not apply, DAILY    fenofibrate (TRIGLIDE) 160 mg, Oral, DAILY    gabapentin (NEURONTIN) 100 mg, Oral, 2 TIMES DAILY    hydrALAZINE (APRESOLINE) 50 MG tablet take 1 tablet by mouth three times a day    metoprolol succinate (TOPROL XL) 100 MG extended release tablet take 1 tablet by mouth once daily    Multiple Vitamins-Minerals (THERAPEUTIC MULTIVITAMIN-MINERALS) tablet 1 tablet, Oral, DAILY, women's 50+ advanced     Omega-3 Fatty Acids (FISH OIL) 1200 MG CAPS Oral, 2 times daily    omeprazole (PRILOSEC) 20 mg, Oral, 2 TIMES DAILY    polyethylene glycol (GLYCOLAX) 17 GM/SCOOP powder Please follow instructions provided to you by your provider.    valACYclovir (VALTREX) 1,000 mg, Oral, 2 TIMES DAILY       No Known Allergies    Review of Systems:   Focused review of systems was performed, and negative as pertinent to diagnosis, except as stated in HPI.      Physical Exam  Constitutional:       Appearance: Normal appearance.   Pulmonary:      Effort: Pulmonary effort is normal.   Neurological:      Mental Status: alert.   Psychiatric:         Attention and Perception: Attention and perception normal.         Mood and Affect: Mood and affect normal.   Cardiovascular:      Rate: Normal rate.       Patient's current physical status, medications, medical history, and HPI have been reviewed and updated as appropriate on this date: 08/06/24    Risk/Benefit(s): The risks, benefits, alternatives, and potential complications have been

## 2024-08-06 NOTE — OP NOTE
PROCEDURE PERFORMED: Right Lumbar Transforaminal Epidural Steroid Injection using Fluoroscopy    PREOPERATIVE DIAGNOSIS: Right lumbosacral radiculopathy    INDICATIONS: Right radicular leg pain    The patient's history and physical exam were reviewed.  The risk, benefits, and alternatives of the procedure were discussed and all questions were answered to the patient's satisfaction.  The patient agreed to proceed and written informed consent was obtained.    POSTOPERATIVE DIAGNOSIS: Same    PHYSICIAN:  Dr. Apolinar Carreno DO    ANESTHESIA:  LOCAL    ASSISTANT:  NONE    PATHOLOGY:  NONE    ESTIMATED BLOOD LOSS:  N/A    IMPLANTS:  NONE    PROCEDURE DESCRIPTION: Right lumbar transforaminal epidural injection using fluoroscopy    Targeted levels: Right lumbar L4 and L5 transforaminal epidural space    The patient was placed on the operative bed in prone position.  The area was prepped with  Chlorhexidine.  The area was then draped in a sterile fashion.  An AP fluoroscopic  film was taken to identify the L5 vertebral body, pedicle and superior articulating process.  The skin and subcutaneous tissues were anesthetized with 1% lidocaine.  Then a 22-gauge 3-1/2 inch Quincke spinal needle was advanced under fluoroscopic guidance using an oblique view just inferior to the pedicle at L5.  Then utilizing AP and lateral fluoroscopic views, I confirmed the position of the needle tip to be within the neural foramen.  After negative aspiration, Omnipaque was injected under AP view at each level.  There was adequate contrast spread along the nerve root and in the epidural space.  There was no evidence of intravascular uptake or intrathecal spread in imaging.  At this point, after negative aspiration, a total volume of treatment injectate consisting of 1 mL of dexamethasone 10 mg/mL and 2 mL of normal saline preservative-free was easily injected.  The needle was then removed.  There was applied pressure to the needle insertion

## 2024-08-06 NOTE — DISCHARGE INSTRUCTIONS

## 2024-08-13 ENCOUNTER — OFFICE VISIT (OUTPATIENT)
Dept: ORTHOPEDIC SURGERY | Age: 64
End: 2024-08-13
Payer: MEDICARE

## 2024-08-13 DIAGNOSIS — G89.29 CHRONIC RIGHT-SIDED LOW BACK PAIN WITH RIGHT-SIDED SCIATICA: Primary | ICD-10-CM

## 2024-08-13 DIAGNOSIS — M54.41 CHRONIC RIGHT-SIDED LOW BACK PAIN WITH RIGHT-SIDED SCIATICA: Primary | ICD-10-CM

## 2024-08-13 PROCEDURE — 1036F TOBACCO NON-USER: CPT | Performed by: ORTHOPAEDIC SURGERY

## 2024-08-13 PROCEDURE — 1123F ACP DISCUSS/DSCN MKR DOCD: CPT | Performed by: ORTHOPAEDIC SURGERY

## 2024-08-13 PROCEDURE — 3017F COLORECTAL CA SCREEN DOC REV: CPT | Performed by: ORTHOPAEDIC SURGERY

## 2024-08-13 PROCEDURE — 1090F PRES/ABSN URINE INCON ASSESS: CPT | Performed by: ORTHOPAEDIC SURGERY

## 2024-08-13 PROCEDURE — G8417 CALC BMI ABV UP PARAM F/U: HCPCS | Performed by: ORTHOPAEDIC SURGERY

## 2024-08-13 PROCEDURE — G8400 PT W/DXA NO RESULTS DOC: HCPCS | Performed by: ORTHOPAEDIC SURGERY

## 2024-08-13 PROCEDURE — G8428 CUR MEDS NOT DOCUMENT: HCPCS | Performed by: ORTHOPAEDIC SURGERY

## 2024-08-13 PROCEDURE — 99213 OFFICE O/P EST LOW 20 MIN: CPT | Performed by: ORTHOPAEDIC SURGERY

## 2024-08-13 NOTE — PROGRESS NOTES
Patient ID: Cynthia Cantua is a 64 y.o. female    Chief Compliant:  No chief complaint on file.       Diagnostic imaging:        Assessment and Plan:  1. Chronic right-sided low back pain with right-sided sciatica      Previously had not seen any significant nerve impingement.    Patient reports that SI joint injection without significant improvement.    Patient reports she did get relief with a transforaminal L4 5 injection on 8 6.    Low back pain with right radicular leg pain into the buttock    Continue following with Dr. Parker for her foot pain.  Patient no longer complaining of pain as much in the plantar fascial region but little bit more along the base of the fifth metatarsal where she reports her foot is popping    Continue to work with Dr. Carreno    PT    Follow up 3 months    HPI:  This is a 64 y.o. female who presents to the clinic today for follow up of low back pain with right radicular leg pain into the buttock.     Patient received 2 injections which provided relief after the second injection    Patient still has complaints of plantar fascitis of her right foot, but continues to follow with Dr. Parker. She notes she has popping in her foot.    Review of Systems   All other systems reviewed and are negative.      Past History:    Current Outpatient Medications:     gabapentin (NEURONTIN) 100 MG capsule, Take 1 capsule by mouth 2 times daily for 180 days., Disp: 60 capsule, Rfl: 2    fenofibrate (TRIGLIDE) 160 MG tablet, Take 1 tablet by mouth daily, Disp: 90 tablet, Rfl: 2    hydrALAZINE (APRESOLINE) 50 MG tablet, take 1 tablet by mouth three times a day, Disp: 270 tablet, Rfl: 1    atorvastatin (LIPITOR) 40 MG tablet, take 1 tablet by mouth at bedtime, Disp: 90 tablet, Rfl: 2    amLODIPine (NORVASC) 10 MG tablet, Take 1 tablet by mouth daily, Disp: 90 tablet, Rfl: 1    polyethylene glycol (GLYCOLAX) 17 GM/SCOOP powder, Please follow instructions provided to you by your provider., Disp: 238

## 2024-08-20 ENCOUNTER — OFFICE VISIT (OUTPATIENT)
Dept: PODIATRY | Age: 64
End: 2024-08-20
Payer: MEDICARE

## 2024-08-20 VITALS — BODY MASS INDEX: 29.88 KG/M2 | WEIGHT: 175 LBS | HEIGHT: 64 IN

## 2024-08-20 DIAGNOSIS — M79.671 PAIN IN RIGHT FOOT: ICD-10-CM

## 2024-08-20 DIAGNOSIS — M72.2 PLANTAR FASCIITIS OF RIGHT FOOT: Primary | ICD-10-CM

## 2024-08-20 PROCEDURE — 1123F ACP DISCUSS/DSCN MKR DOCD: CPT | Performed by: PODIATRIST

## 2024-08-20 PROCEDURE — G8400 PT W/DXA NO RESULTS DOC: HCPCS | Performed by: PODIATRIST

## 2024-08-20 PROCEDURE — G8427 DOCREV CUR MEDS BY ELIG CLIN: HCPCS | Performed by: PODIATRIST

## 2024-08-20 PROCEDURE — 1090F PRES/ABSN URINE INCON ASSESS: CPT | Performed by: PODIATRIST

## 2024-08-20 PROCEDURE — 3017F COLORECTAL CA SCREEN DOC REV: CPT | Performed by: PODIATRIST

## 2024-08-20 PROCEDURE — 20550 NJX 1 TENDON SHEATH/LIGAMENT: CPT | Performed by: PODIATRIST

## 2024-08-20 PROCEDURE — 1036F TOBACCO NON-USER: CPT | Performed by: PODIATRIST

## 2024-08-20 PROCEDURE — G8417 CALC BMI ABV UP PARAM F/U: HCPCS | Performed by: PODIATRIST

## 2024-08-20 PROCEDURE — 99213 OFFICE O/P EST LOW 20 MIN: CPT | Performed by: PODIATRIST

## 2024-08-20 RX ORDER — BUPIVACAINE HYDROCHLORIDE 5 MG/ML
30 INJECTION, SOLUTION PERINEURAL ONCE
Status: COMPLETED | OUTPATIENT
Start: 2024-08-20 | End: 2024-08-20

## 2024-08-20 RX ADMIN — BUPIVACAINE HYDROCHLORIDE 150 MG: 5 INJECTION, SOLUTION PERINEURAL at 13:30

## 2024-08-20 ASSESSMENT — ENCOUNTER SYMPTOMS
SHORTNESS OF BREATH: 0
BACK PAIN: 0
NAUSEA: 0
COLOR CHANGE: 0
DIARRHEA: 0

## 2024-08-20 NOTE — PROGRESS NOTES
[facility]  Return Patient Progress Note    Subjective: Cynthia J Cantua 64 y.o. female that presents for follow up evaluation of plantar fasciitis to the right foot.   Chief Complaint   Patient presents with    Foot Pain     Patient is still having right foot pain    Patient's treatment thus far has included steroid injection x 1, icing and stretching.  Pain is rated 7 out of 10 and is described as intermittent. Patient has been following my prescribed course of therapy as instructed.     Review of Systems   Constitutional:  Negative for activity change, appetite change, chills, diaphoresis, fatigue and fever.   Respiratory:  Negative for shortness of breath.    Cardiovascular:  Negative for leg swelling.   Gastrointestinal:  Negative for diarrhea and nausea.   Endocrine: Negative for cold intolerance, heat intolerance and polyuria.   Musculoskeletal:  Positive for arthralgias. Negative for back pain, gait problem, joint swelling and myalgias.   Skin:  Negative for color change, pallor, rash and wound.   Allergic/Immunologic: Negative for environmental allergies and food allergies.   Neurological:  Negative for dizziness, weakness, light-headedness and numbness.   Hematological:  Does not bruise/bleed easily.   Psychiatric/Behavioral:  Negative for behavioral problems, confusion and self-injury. The patient is not nervous/anxious.        Objective: Clinical evaluation of the patient reveals a return of pain with palpation to the plantar medial calcaneal tubercle of the right foot. There is no pain with palpation to the plantar central aspect of the right heel.  There is no pain with palpation to the central band of the plantar fascia of the right foot within the medial longitudinal arch. There is no pain with palpation to the arelis pedis of the right foot. There is no pain with medial to lateral compression of the right calcaneus. Tinel's sign is negative to the right tarsal tunnel. There is no

## 2024-08-28 ENCOUNTER — HOSPITAL ENCOUNTER (OUTPATIENT)
Age: 64
Setting detail: SPECIMEN
Discharge: HOME | End: 2024-08-28

## 2024-08-28 ENCOUNTER — HOSPITAL ENCOUNTER (OUTPATIENT)
Dept: PAIN MANAGEMENT | Age: 64
Discharge: HOME | End: 2024-08-28
Payer: MEDICARE

## 2024-08-28 VITALS — BODY MASS INDEX: 29.88 KG/M2 | HEIGHT: 64 IN | WEIGHT: 175 LBS

## 2024-08-28 DIAGNOSIS — Z13.21 ENCOUNTER FOR VITAMIN DEFICIENCY SCREENING: ICD-10-CM

## 2024-08-28 DIAGNOSIS — M79.18 CHRONIC GLUTEAL PAIN: Primary | ICD-10-CM

## 2024-08-28 DIAGNOSIS — M51.36 LUMBAR DEGENERATIVE DISC DISEASE: ICD-10-CM

## 2024-08-28 DIAGNOSIS — M53.3 SACROILIAC JOINT PAIN: ICD-10-CM

## 2024-08-28 DIAGNOSIS — E78.2 MIXED HYPERLIPIDEMIA: ICD-10-CM

## 2024-08-28 DIAGNOSIS — R73.03 PREDIABETES: ICD-10-CM

## 2024-08-28 DIAGNOSIS — N18.2 BENIGN HYPERTENSION WITH CKD (CHRONIC KIDNEY DISEASE), STAGE II: ICD-10-CM

## 2024-08-28 DIAGNOSIS — I12.9 BENIGN HYPERTENSION WITH CKD (CHRONIC KIDNEY DISEASE), STAGE II: ICD-10-CM

## 2024-08-28 DIAGNOSIS — G89.29 CHRONIC GLUTEAL PAIN: Primary | ICD-10-CM

## 2024-08-28 DIAGNOSIS — M47.817 LUMBOSACRAL SPONDYLOSIS WITHOUT MYELOPATHY: ICD-10-CM

## 2024-08-28 DIAGNOSIS — N18.2 CKD (CHRONIC KIDNEY DISEASE), STAGE II: ICD-10-CM

## 2024-08-28 LAB
25(OH)D3 SERPL-MCNC: 56 NG/ML (ref 30–100)
ANION GAP SERPL CALCULATED.3IONS-SCNC: 12 MMOL/L (ref 9–16)
BACTERIA URNS QL MICRO: ABNORMAL
BILIRUB UR QL STRIP: NEGATIVE
BUN SERPL-MCNC: 31 MG/DL (ref 8–23)
CALCIUM SERPL-MCNC: 10.3 MG/DL (ref 8.6–10.4)
CASTS #/AREA URNS LPF: ABNORMAL /LPF (ref 0–8)
CHLORIDE SERPL-SCNC: 104 MMOL/L (ref 98–107)
CHOLEST SERPL-MCNC: 180 MG/DL (ref 0–199)
CHOLESTEROL/HDL RATIO: 3
CLARITY UR: CLEAR
CO2 SERPL-SCNC: 24 MMOL/L (ref 20–31)
COLOR UR: YELLOW
CREAT SERPL-MCNC: 1.1 MG/DL (ref 0.5–0.9)
EPI CELLS #/AREA URNS HPF: ABNORMAL /HPF (ref 0–5)
EST. AVERAGE GLUCOSE BLD GHB EST-MCNC: 120 MG/DL
GFR, ESTIMATED: 55 ML/MIN/1.73M2
GLUCOSE UR STRIP-MCNC: NEGATIVE MG/DL
HBA1C MFR BLD: 5.8 % (ref 4–6)
HCT VFR BLD AUTO: 42 % (ref 36.3–47.1)
HDLC SERPL-MCNC: 60 MG/DL
HGB BLD-MCNC: 13.7 G/DL (ref 11.9–15.1)
HGB UR QL STRIP.AUTO: NEGATIVE
KETONES UR STRIP-MCNC: NEGATIVE MG/DL
LDLC SERPL CALC-MCNC: 99 MG/DL (ref 0–100)
LEUKOCYTE ESTERASE UR QL STRIP: ABNORMAL
MAGNESIUM SERPL-MCNC: 2 MG/DL (ref 1.6–2.4)
NITRITE UR QL STRIP: NEGATIVE
PH UR STRIP: 7 [PH] (ref 5–8)
PHOSPHATE SERPL-MCNC: 3 MG/DL (ref 2.5–4.5)
POTASSIUM SERPL-SCNC: 4.7 MMOL/L (ref 3.7–5.3)
PROT UR STRIP-MCNC: NEGATIVE MG/DL
RBC #/AREA URNS HPF: ABNORMAL /HPF (ref 0–4)
SODIUM SERPL-SCNC: 140 MMOL/L (ref 136–145)
SP GR UR STRIP: 1 (ref 1–1.03)
TRIGL SERPL-MCNC: 105 MG/DL
UROBILINOGEN UR STRIP-ACNC: NORMAL EU/DL (ref 0–1)
VLDLC SERPL CALC-MCNC: 21 MG/DL
WBC #/AREA URNS HPF: ABNORMAL /HPF (ref 0–5)

## 2024-08-28 PROCEDURE — 99214 OFFICE O/P EST MOD 30 MIN: CPT | Performed by: STUDENT IN AN ORGANIZED HEALTH CARE EDUCATION/TRAINING PROGRAM

## 2024-08-28 PROCEDURE — 99213 OFFICE O/P EST LOW 20 MIN: CPT

## 2024-08-28 NOTE — PROGRESS NOTES
Not on file     Physically or Sexually Abused: Not on file   Housing Stability: Unknown (5/29/2024)    Housing Stability Vital Sign     Unable to Pay for Housing in the Last Year: Not on file     Number of Places Lived in the Last Year: Not on file     Unstable Housing in the Last Year: No       Medications & Allergies:   Current Outpatient Medications   Medication Instructions    amLODIPine (NORVASC) 10 mg, Oral, DAILY    aspirin 81 MG chewable tablet aspirin 81 mg chewable tablet   Chew 1 tablet every day by oral route.    atorvastatin (LIPITOR) 40 MG tablet take 1 tablet by mouth at bedtime    bisacodyl 5 MG EC tablet Please follow instructions given to you by your provider.    buPROPion (WELLBUTRIN SR) 150 MG extended release tablet take 1 tablet by mouth twice a day    Cholecalciferol (VITAMIN D) 50 MCG (2000 UT) CAPS capsule Oral    clobetasol (TEMOVATE) 0.05 % cream Apply topically 2 times daily.    Elastic Bandages & Supports (LUMBAR BACK BRACE/SUPPORT PAD) MISC 1 each, Does not apply, DAILY    fenofibrate (TRIGLIDE) 160 mg, Oral, DAILY    gabapentin (NEURONTIN) 100 mg, Oral, 2 TIMES DAILY    hydrALAZINE (APRESOLINE) 50 MG tablet take 1 tablet by mouth three times a day    metoprolol succinate (TOPROL XL) 100 MG extended release tablet take 1 tablet by mouth once daily    Multiple Vitamins-Minerals (THERAPEUTIC MULTIVITAMIN-MINERALS) tablet 1 tablet, Oral, DAILY, women's 50+ advanced     Omega-3 Fatty Acids (FISH OIL) 1200 MG CAPS Oral, 2 times daily    omeprazole (PRILOSEC) 20 mg, Oral, 2 TIMES DAILY    polyethylene glycol (GLYCOLAX) 17 GM/SCOOP powder Please follow instructions provided to you by your provider.    valACYclovir (VALTREX) 1,000 mg, Oral, 2 TIMES DAILY       No Known Allergies    Review of Systems:   Constitutional: negative for weight changes or fevers  Cardiovascular: negative for chest pain, palpitations, irregular heart beat  Respiratory: negative for dyspnea, cough,  wheezing  Gastrointestinal: negative for constipation, diarrhea, nausea  Genitourinary: negative for bowel/bladder incontinence   Musculoskeletal: positive for low back pain  Neurological: negative for radicular leg pain, leg weakness or numbness/tingling  Behavioral/Psych: negative for anxiety/depression   Hematological: negative for abnormal bleeding, anticoagulation use or antiplatelet use  All other systems reviewed and are negative    OBJECTIVE:    There were no vitals filed for this visit.    PHYSICAL EXAM    GENERAL: No acute distress, pleasant, well-appearing  HEENT: Normocephalic, atraumatic, Pupils equal and round  CARDIOVASCULAR: Well perfused, No peripheral cyanosis  PULMONARY: Good chest wall excursion, breathing unlabored  PSYCH: Appropriate affect and insight, non-pressured speech  SKIN: No rashes or lesions  MUSCULOSKELETAL:  Inspection: The back and extremities are symmetric and aligned. Muscle bulk is normal in appearance.  Palpation: There is tenderness to palpation along the lumbar paraspinal musculature bilaterally  Lumbar range of motion is full    NEUROMUSCULAR:  Patient ambulates unassisted  Gait is nonantalgic  Sensation to light touch is grossly intact in lower extremities  Strength is grossly intact in lower extremities  No ankle clonus    Special Tests:  Lumbar facet loading is positive on right  Seated straight leg raise is negative bilaterally    DIAGNOSIS:    ICD-10-CM    1. Chronic gluteal pain  M79.18 PRQ IMPLTJ NEUROSTIMULATOR ELTRD PERIPHERAL NRV    G89.29       2. Sacroiliac joint pain  M53.3       3. Lumbosacral spondylosis without myelopathy  M47.817       4. Lumbar degenerative disc disease  M51.36           ASSESSMENT:    Cynthia J Cantua is a 64 y.o.female who presents with chronic low back pain. To review, patient has had symptoms for the last year without injury or trauma.  She has not had lumbar spine surgery.  She was referred by orthopedic spine surgery for injection

## 2024-08-28 NOTE — H&P (VIEW-ONLY)
wheezing  Gastrointestinal: negative for constipation, diarrhea, nausea  Genitourinary: negative for bowel/bladder incontinence   Musculoskeletal: positive for low back pain  Neurological: negative for radicular leg pain, leg weakness or numbness/tingling  Behavioral/Psych: negative for anxiety/depression   Hematological: negative for abnormal bleeding, anticoagulation use or antiplatelet use  All other systems reviewed and are negative    OBJECTIVE:    There were no vitals filed for this visit.    PHYSICAL EXAM    GENERAL: No acute distress, pleasant, well-appearing  HEENT: Normocephalic, atraumatic, Pupils equal and round  CARDIOVASCULAR: Well perfused, No peripheral cyanosis  PULMONARY: Good chest wall excursion, breathing unlabored  PSYCH: Appropriate affect and insight, non-pressured speech  SKIN: No rashes or lesions  MUSCULOSKELETAL:  Inspection: The back and extremities are symmetric and aligned. Muscle bulk is normal in appearance.  Palpation: There is tenderness to palpation along the lumbar paraspinal musculature bilaterally  Lumbar range of motion is full    NEUROMUSCULAR:  Patient ambulates unassisted  Gait is nonantalgic  Sensation to light touch is grossly intact in lower extremities  Strength is grossly intact in lower extremities  No ankle clonus    Special Tests:  Lumbar facet loading is positive on right  Seated straight leg raise is negative bilaterally    DIAGNOSIS:    ICD-10-CM    1. Chronic gluteal pain  M79.18 PRQ IMPLTJ NEUROSTIMULATOR ELTRD PERIPHERAL NRV    G89.29       2. Sacroiliac joint pain  M53.3       3. Lumbosacral spondylosis without myelopathy  M47.817       4. Lumbar degenerative disc disease  M51.36           ASSESSMENT:    Cynthia J Cantua is a 64 y.o.female who presents with chronic low back pain. To review, patient has had symptoms for the last year without injury or trauma.  She has not had lumbar spine surgery.  She was referred by orthopedic spine surgery for injection

## 2024-09-03 ENCOUNTER — HOSPITAL ENCOUNTER (OUTPATIENT)
Dept: PAIN MANAGEMENT | Facility: CLINIC | Age: 64
Discharge: HOME | End: 2024-09-03
Payer: MEDICARE

## 2024-09-03 VITALS
TEMPERATURE: 97 F | SYSTOLIC BLOOD PRESSURE: 193 MMHG | HEIGHT: 64 IN | HEART RATE: 84 BPM | DIASTOLIC BLOOD PRESSURE: 87 MMHG | BODY MASS INDEX: 29.88 KG/M2 | RESPIRATION RATE: 12 BRPM | OXYGEN SATURATION: 98 % | WEIGHT: 175 LBS

## 2024-09-03 DIAGNOSIS — R52 PAIN MANAGEMENT: ICD-10-CM

## 2024-09-03 PROBLEM — N18.9 CHRONIC KIDNEY DISEASE (CKD): Status: ACTIVE | Noted: 2024-09-03

## 2024-09-03 PROCEDURE — 2500000003 HC RX 250 WO HCPCS: Performed by: STUDENT IN AN ORGANIZED HEALTH CARE EDUCATION/TRAINING PROGRAM

## 2024-09-03 PROCEDURE — 64555 IMPLANT NEUROELECTRODES: CPT

## 2024-09-03 PROCEDURE — 64555 IMPLANT NEUROELECTRODES: CPT | Performed by: STUDENT IN AN ORGANIZED HEALTH CARE EDUCATION/TRAINING PROGRAM

## 2024-09-03 PROCEDURE — C1778 LEAD, NEUROSTIMULATOR: HCPCS

## 2024-09-03 RX ORDER — LIDOCAINE HYDROCHLORIDE 10 MG/ML
INJECTION, SOLUTION EPIDURAL; INFILTRATION; INTRACAUDAL; PERINEURAL
Status: COMPLETED | OUTPATIENT
Start: 2024-09-03 | End: 2024-09-03

## 2024-09-03 RX ADMIN — LIDOCAINE HYDROCHLORIDE 2 ML: 10 INJECTION, SOLUTION EPIDURAL; INFILTRATION; INTRACAUDAL at 10:34

## 2024-09-03 ASSESSMENT — PAIN DESCRIPTION - DESCRIPTORS: DESCRIPTORS: ACHING

## 2024-09-03 ASSESSMENT — PAIN SCALES - GENERAL: PAINLEVEL_OUTOF10: 6

## 2024-09-03 ASSESSMENT — PAIN - FUNCTIONAL ASSESSMENT
PAIN_FUNCTIONAL_ASSESSMENT: 0-10
PAIN_FUNCTIONAL_ASSESSMENT: PREVENTS OR INTERFERES SOME ACTIVE ACTIVITIES AND ADLS

## 2024-09-03 NOTE — OP NOTE
PROCEDURE PERFORMED:  Right superior cluneal nerve peripheral nerve stimulator implantation using fluoroscopy     PREOPERATIVE DIAGNOSIS: Right lumbago/right gluteal pain     INDICATIONS: Chronic right low back pain/gluteal pain     The patient's history and physical exam were reviewed.  The risk, benefits, and alternatives of the procedure were discussed and all questions were answered to the patient's satisfaction.  The patient agreed to proceed and written informed consent was obtained.     POSTOPERATIVE DIAGNOSIS: Same     PHYSICIAN:  Dr. Apolinar Carreno DO     ANESTHESIA:  LOCAL     ASSISTANT:  NONE     PATHOLOGY:  NONE     ESTIMATED BLOOD LOSS:  N/A     IMPLANTS: Peripheral nerve stimulator     PROCEDURE DESCRIPTION:  Right superior cluneal nerve peripheral nerve stimulator implantation using fluoroscopy     The patient was placed on the operative bed in prone position.  The area was prepped with Chlorhexidine.  The area was then draped in a sterile fashion.  Fluoroscopy was used to identify the iliac spine. After identifying and marking the intended target along the course of the superior cluneal nerve, the skin around the planned entry point and the subcutaneous tissues were injected with local anesthetic.     A percutaneous sleeve and stimulating probe lead introduction system were assembled, inserted and advanced along the intended course of the superior cluneal nerve as it traverses along the superior aspect of the iliac spine, taking care to maintain the proper depth of insertion as the introducer was advanced under fluoroscopic guidance. The introducer needle was delivered to a location in proximity to the nerve.    Multiple stimulation parameters were used to deliver stimulation to the superior cluneal nerve in concert with stimulating at multiple positions around the nerve.  Nerve target acquisition was confirmed noting generation of paresthesias in the paravertebral regions corresponding to the level

## 2024-09-03 NOTE — DISCHARGE INSTRUCTIONS
Discharge Instructions following Sedation or Anesthesia:  If you have  received  a sedative/anesthetic therefore, you should not consume any alcoholic beverages for minimum of 12 hours.  Do not sign legal documents for 24 hours.  Dizziness, drowsiness, and unsteadiness may occur.  Rest when need to.  Increase diet as tolerated.  Keep up on fluids if diet allows.      *SEE SPRINT INSTRUCTION SHEET PROVIDED BY REPRESENTATIVE      Call [facility] at [phone] if you experience:   Fever, chills or temperature over 100    Vomiting, Headache, persistent stiff neck, nausea, blurred vision   Difficulty in urinating or unable to urinate with 8 hours   Increase in weakness, numbness or loss of function   Increased redness, swelling or drainage at the injection site   Monitor for any signs of infection, such as, redness, swelling, green or yellow foul smelling drainage at injection site. Increased pain or fever.

## 2024-09-03 NOTE — INTERVAL H&P NOTE
Update History & Physical    The patient's History and Physical of August 28, 2024 was reviewed with the patient and I examined the patient. There was no change. The surgical site was confirmed by the patient and me.     Plan: The risks, benefits, expected outcome, and alternative to the recommended procedure have been discussed with the patient. Patient understands and wants to proceed with the procedure.     ASA CLASSIFICATION  2  MP   CLASSIFICATION  2    Electronically signed by Apolinar aCrreno DO on 9/3/2024 at 10:11 AM

## 2024-09-13 ENCOUNTER — HOSPITAL ENCOUNTER (OUTPATIENT)
Dept: PREADMISSION TESTING | Age: 64
Discharge: HOME | End: 2024-09-17

## 2024-09-13 VITALS — WEIGHT: 177 LBS | BODY MASS INDEX: 30.22 KG/M2 | HEIGHT: 64 IN

## 2024-09-17 ENCOUNTER — HOSPITAL ENCOUNTER (OUTPATIENT)
Dept: PAIN MANAGEMENT | Facility: CLINIC | Age: 64
Discharge: HOME | End: 2024-09-17
Payer: MEDICARE

## 2024-09-17 VITALS
HEIGHT: 64 IN | WEIGHT: 179 LBS | TEMPERATURE: 97.9 F | RESPIRATION RATE: 13 BRPM | SYSTOLIC BLOOD PRESSURE: 162 MMHG | OXYGEN SATURATION: 95 % | HEART RATE: 79 BPM | BODY MASS INDEX: 30.56 KG/M2 | DIASTOLIC BLOOD PRESSURE: 83 MMHG

## 2024-09-17 DIAGNOSIS — R52 PAIN MANAGEMENT: ICD-10-CM

## 2024-09-17 PROCEDURE — 2500000003 HC RX 250 WO HCPCS: Performed by: STUDENT IN AN ORGANIZED HEALTH CARE EDUCATION/TRAINING PROGRAM

## 2024-09-17 PROCEDURE — 64555 IMPLANT NEUROELECTRODES: CPT

## 2024-09-17 PROCEDURE — 6360000002 HC RX W HCPCS: Performed by: STUDENT IN AN ORGANIZED HEALTH CARE EDUCATION/TRAINING PROGRAM

## 2024-09-17 PROCEDURE — 64555 IMPLANT NEUROELECTRODES: CPT | Performed by: STUDENT IN AN ORGANIZED HEALTH CARE EDUCATION/TRAINING PROGRAM

## 2024-09-17 PROCEDURE — 99152 MOD SED SAME PHYS/QHP 5/>YRS: CPT | Performed by: STUDENT IN AN ORGANIZED HEALTH CARE EDUCATION/TRAINING PROGRAM

## 2024-09-17 PROCEDURE — C1778 LEAD, NEUROSTIMULATOR: HCPCS

## 2024-09-17 RX ORDER — LIDOCAINE HYDROCHLORIDE 10 MG/ML
INJECTION, SOLUTION EPIDURAL; INFILTRATION; INTRACAUDAL; PERINEURAL
Status: COMPLETED | OUTPATIENT
Start: 2024-09-17 | End: 2024-09-17

## 2024-09-17 RX ORDER — MIDAZOLAM HYDROCHLORIDE 2 MG/2ML
INJECTION, SOLUTION INTRAMUSCULAR; INTRAVENOUS
Status: COMPLETED | OUTPATIENT
Start: 2024-09-17 | End: 2024-09-17

## 2024-09-17 RX ADMIN — LIDOCAINE HYDROCHLORIDE 3 ML: 10 INJECTION, SOLUTION EPIDURAL; INFILTRATION; INTRACAUDAL at 08:44

## 2024-09-17 RX ADMIN — MIDAZOLAM HYDROCHLORIDE 2 MG: 1 INJECTION, SOLUTION INTRAMUSCULAR; INTRAVENOUS at 08:42

## 2024-09-17 ASSESSMENT — PAIN DESCRIPTION - DESCRIPTORS: DESCRIPTORS: NAGGING;ACHING

## 2024-09-17 ASSESSMENT — PAIN - FUNCTIONAL ASSESSMENT: PAIN_FUNCTIONAL_ASSESSMENT: 0-10

## 2024-09-17 ASSESSMENT — PAIN SCALES - GENERAL: PAINLEVEL_OUTOF10: 6

## 2024-09-24 ENCOUNTER — ANESTHESIA EVENT (OUTPATIENT)
Dept: ENDOSCOPY | Age: 64
End: 2024-09-24
Payer: COMMERCIAL

## 2024-09-25 ENCOUNTER — ANESTHESIA (OUTPATIENT)
Dept: ENDOSCOPY | Age: 64
End: 2024-09-25
Payer: COMMERCIAL

## 2024-09-25 ENCOUNTER — HOSPITAL ENCOUNTER (OUTPATIENT)
Age: 64
Setting detail: OUTPATIENT SURGERY
Discharge: HOME | End: 2024-09-25
Attending: INTERNAL MEDICINE | Admitting: INTERNAL MEDICINE
Payer: COMMERCIAL

## 2024-09-25 VITALS
RESPIRATION RATE: 16 BRPM | SYSTOLIC BLOOD PRESSURE: 138 MMHG | DIASTOLIC BLOOD PRESSURE: 77 MMHG | HEART RATE: 78 BPM | TEMPERATURE: 97.5 F | BODY MASS INDEX: 30.22 KG/M2 | WEIGHT: 177 LBS | HEIGHT: 64 IN | OXYGEN SATURATION: 96 %

## 2024-09-25 DIAGNOSIS — Z12.11 COLON CANCER SCREENING: ICD-10-CM

## 2024-09-25 PROCEDURE — 7100000011 HC PHASE II RECOVERY - ADDTL 15 MIN: Performed by: INTERNAL MEDICINE

## 2024-09-25 PROCEDURE — 3609010600 HC COLONOSCOPY POLYPECTOMY SNARE/COLD BIOPSY: Performed by: INTERNAL MEDICINE

## 2024-09-25 PROCEDURE — C1889 IMPLANT/INSERT DEVICE, NOC: HCPCS | Performed by: INTERNAL MEDICINE

## 2024-09-25 PROCEDURE — 6360000002 HC RX W HCPCS: Performed by: INTERNAL MEDICINE

## 2024-09-25 PROCEDURE — 2500000003 HC RX 250 WO HCPCS: Performed by: NURSE ANESTHETIST, CERTIFIED REGISTERED

## 2024-09-25 PROCEDURE — 2720000010 HC SURG SUPPLY STERILE: Performed by: INTERNAL MEDICINE

## 2024-09-25 PROCEDURE — 2580000003 HC RX 258: Performed by: ANESTHESIOLOGY

## 2024-09-25 PROCEDURE — 7100000010 HC PHASE II RECOVERY - FIRST 15 MIN: Performed by: INTERNAL MEDICINE

## 2024-09-25 PROCEDURE — 3700000001 HC ADD 15 MINUTES (ANESTHESIA): Performed by: INTERNAL MEDICINE

## 2024-09-25 PROCEDURE — 3700000000 HC ANESTHESIA ATTENDED CARE: Performed by: INTERNAL MEDICINE

## 2024-09-25 PROCEDURE — 6360000002 HC RX W HCPCS: Performed by: NURSE ANESTHETIST, CERTIFIED REGISTERED

## 2024-09-25 PROCEDURE — 2709999900 HC NON-CHARGEABLE SUPPLY: Performed by: INTERNAL MEDICINE

## 2024-09-25 PROCEDURE — 88305 TISSUE EXAM BY PATHOLOGIST: CPT

## 2024-09-25 DEVICE — WORKING LENGTH 235CM, WORKING CHANNEL 2.8MM
Type: IMPLANTABLE DEVICE | Site: SIGMOID COLON | Status: FUNCTIONAL
Brand: RESOLUTION 360 CLIP

## 2024-09-25 RX ORDER — DEXAMETHASONE SODIUM PHOSPHATE 4 MG/ML
INJECTION, SOLUTION INTRA-ARTICULAR; INTRALESIONAL; INTRAMUSCULAR; INTRAVENOUS; SOFT TISSUE
Status: DISCONTINUED | OUTPATIENT
Start: 2024-09-25 | End: 2024-09-25 | Stop reason: SDUPTHER

## 2024-09-25 RX ORDER — ONDANSETRON 2 MG/ML
INJECTION INTRAMUSCULAR; INTRAVENOUS
Status: DISCONTINUED | OUTPATIENT
Start: 2024-09-25 | End: 2024-09-25 | Stop reason: SDUPTHER

## 2024-09-25 RX ORDER — PROPOFOL 10 MG/ML
INJECTION, EMULSION INTRAVENOUS
Status: DISCONTINUED | OUTPATIENT
Start: 2024-09-25 | End: 2024-09-25 | Stop reason: SDUPTHER

## 2024-09-25 RX ORDER — SODIUM CHLORIDE 0.9 % (FLUSH) 0.9 %
5-40 SYRINGE (ML) INJECTION PRN
Status: DISCONTINUED | OUTPATIENT
Start: 2024-09-25 | End: 2024-09-25 | Stop reason: HOSPADM

## 2024-09-25 RX ORDER — SODIUM CHLORIDE 0.9 % (FLUSH) 0.9 %
5-40 SYRINGE (ML) INJECTION EVERY 12 HOURS SCHEDULED
Status: DISCONTINUED | OUTPATIENT
Start: 2024-09-25 | End: 2024-09-25 | Stop reason: HOSPADM

## 2024-09-25 RX ORDER — LIDOCAINE HYDROCHLORIDE 10 MG/ML
1 INJECTION, SOLUTION EPIDURAL; INFILTRATION; INTRACAUDAL; PERINEURAL
Status: DISCONTINUED | OUTPATIENT
Start: 2024-09-25 | End: 2024-09-25 | Stop reason: HOSPADM

## 2024-09-25 RX ORDER — LIDOCAINE HYDROCHLORIDE 10 MG/ML
INJECTION, SOLUTION EPIDURAL; INFILTRATION; INTRACAUDAL; PERINEURAL
Status: DISCONTINUED | OUTPATIENT
Start: 2024-09-25 | End: 2024-09-25 | Stop reason: SDUPTHER

## 2024-09-25 RX ORDER — EPINEPHRINE 1 MG/ML(1)
AMPUL (ML) INJECTION PRN
Status: DISCONTINUED | OUTPATIENT
Start: 2024-09-25 | End: 2024-09-25 | Stop reason: ALTCHOICE

## 2024-09-25 RX ORDER — LABETALOL HYDROCHLORIDE 5 MG/ML
INJECTION, SOLUTION INTRAVENOUS
Status: DISCONTINUED | OUTPATIENT
Start: 2024-09-25 | End: 2024-09-25 | Stop reason: SDUPTHER

## 2024-09-25 RX ORDER — SODIUM CHLORIDE 9 MG/ML
INJECTION, SOLUTION INTRAVENOUS PRN
Status: DISCONTINUED | OUTPATIENT
Start: 2024-09-25 | End: 2024-09-25 | Stop reason: HOSPADM

## 2024-09-25 RX ORDER — SODIUM CHLORIDE, SODIUM LACTATE, POTASSIUM CHLORIDE, CALCIUM CHLORIDE 600; 310; 30; 20 MG/100ML; MG/100ML; MG/100ML; MG/100ML
INJECTION, SOLUTION INTRAVENOUS CONTINUOUS
Status: DISCONTINUED | OUTPATIENT
Start: 2024-09-25 | End: 2024-09-25 | Stop reason: HOSPADM

## 2024-09-25 RX ADMIN — PROPOFOL 100 MG: 10 INJECTION, EMULSION INTRAVENOUS at 09:08

## 2024-09-25 RX ADMIN — PROPOFOL 50 MG: 10 INJECTION, EMULSION INTRAVENOUS at 09:32

## 2024-09-25 RX ADMIN — PROPOFOL 50 MG: 10 INJECTION, EMULSION INTRAVENOUS at 09:14

## 2024-09-25 RX ADMIN — LABETALOL HYDROCHLORIDE 10 MG: 5 INJECTION, SOLUTION INTRAVENOUS at 09:52

## 2024-09-25 RX ADMIN — PROPOFOL 50 MG: 10 INJECTION, EMULSION INTRAVENOUS at 09:51

## 2024-09-25 RX ADMIN — PROPOFOL 50 MG: 10 INJECTION, EMULSION INTRAVENOUS at 09:10

## 2024-09-25 RX ADMIN — PROPOFOL 50 MG: 10 INJECTION, EMULSION INTRAVENOUS at 09:13

## 2024-09-25 RX ADMIN — LIDOCAINE HYDROCHLORIDE 50 MG: 10 INJECTION, SOLUTION EPIDURAL; INFILTRATION; INTRACAUDAL; PERINEURAL at 09:08

## 2024-09-25 RX ADMIN — PROPOFOL 50 MG: 10 INJECTION, EMULSION INTRAVENOUS at 09:25

## 2024-09-25 RX ADMIN — PROPOFOL 50 MG: 10 INJECTION, EMULSION INTRAVENOUS at 09:36

## 2024-09-25 RX ADMIN — PROPOFOL 50 MG: 10 INJECTION, EMULSION INTRAVENOUS at 09:22

## 2024-09-25 RX ADMIN — LABETALOL HYDROCHLORIDE 5 MG: 5 INJECTION, SOLUTION INTRAVENOUS at 09:54

## 2024-09-25 RX ADMIN — PROPOFOL 50 MG: 10 INJECTION, EMULSION INTRAVENOUS at 09:40

## 2024-09-25 RX ADMIN — SODIUM CHLORIDE, POTASSIUM CHLORIDE, SODIUM LACTATE AND CALCIUM CHLORIDE: 600; 310; 30; 20 INJECTION, SOLUTION INTRAVENOUS at 08:10

## 2024-09-25 RX ADMIN — ONDANSETRON 4 MG: 2 INJECTION INTRAMUSCULAR; INTRAVENOUS at 09:14

## 2024-09-25 RX ADMIN — DEXAMETHASONE SODIUM PHOSPHATE 4 MG: 4 INJECTION INTRA-ARTICULAR; INTRALESIONAL; INTRAMUSCULAR; INTRAVENOUS; SOFT TISSUE at 09:14

## 2024-09-25 RX ADMIN — PROPOFOL 50 MG: 10 INJECTION, EMULSION INTRAVENOUS at 09:44

## 2024-09-25 RX ADMIN — PROPOFOL 50 MG: 10 INJECTION, EMULSION INTRAVENOUS at 09:19

## 2024-09-25 ASSESSMENT — ENCOUNTER SYMPTOMS
NAUSEA: 0
SHORTNESS OF BREATH: 0
ABDOMINAL PAIN: 0
SINUS PRESSURE: 0

## 2024-09-25 ASSESSMENT — PAIN - FUNCTIONAL ASSESSMENT
PAIN_FUNCTIONAL_ASSESSMENT: NONE - DENIES PAIN
PAIN_FUNCTIONAL_ASSESSMENT: 0-10

## 2024-09-26 ENCOUNTER — TELEPHONE (OUTPATIENT)
Dept: GASTROENTEROLOGY | Age: 64
End: 2024-09-26

## 2024-09-26 LAB — SURGICAL PATHOLOGY REPORT: NORMAL

## 2024-10-08 ENCOUNTER — OFFICE VISIT (OUTPATIENT)
Dept: PODIATRY | Age: 64
End: 2024-10-08
Payer: MEDICARE

## 2024-10-08 VITALS — HEIGHT: 64 IN | BODY MASS INDEX: 29.88 KG/M2 | WEIGHT: 175 LBS

## 2024-10-08 DIAGNOSIS — M72.2 PLANTAR FASCIITIS OF RIGHT FOOT: Primary | ICD-10-CM

## 2024-10-08 DIAGNOSIS — M79.671 PAIN IN RIGHT FOOT: ICD-10-CM

## 2024-10-08 PROCEDURE — G8484 FLU IMMUNIZE NO ADMIN: HCPCS | Performed by: PODIATRIST

## 2024-10-08 PROCEDURE — 99213 OFFICE O/P EST LOW 20 MIN: CPT | Performed by: PODIATRIST

## 2024-10-08 PROCEDURE — G8417 CALC BMI ABV UP PARAM F/U: HCPCS | Performed by: PODIATRIST

## 2024-10-08 PROCEDURE — G8428 CUR MEDS NOT DOCUMENT: HCPCS | Performed by: PODIATRIST

## 2024-10-08 PROCEDURE — 1036F TOBACCO NON-USER: CPT | Performed by: PODIATRIST

## 2024-10-08 PROCEDURE — 3017F COLORECTAL CA SCREEN DOC REV: CPT | Performed by: PODIATRIST

## 2024-10-08 PROCEDURE — 1123F ACP DISCUSS/DSCN MKR DOCD: CPT | Performed by: PODIATRIST

## 2024-10-08 PROCEDURE — G8400 PT W/DXA NO RESULTS DOC: HCPCS | Performed by: PODIATRIST

## 2024-10-08 PROCEDURE — 1090F PRES/ABSN URINE INCON ASSESS: CPT | Performed by: PODIATRIST

## 2024-10-08 ASSESSMENT — ENCOUNTER SYMPTOMS
COLOR CHANGE: 0
DIARRHEA: 0
BACK PAIN: 0
SHORTNESS OF BREATH: 0
NAUSEA: 0

## 2024-10-08 NOTE — PROGRESS NOTES
right foot. There is no pain with medial to lateral compression of the right calcaneus. Tinel's sign is negative to the right tarsal tunnel. There is no erythema, calor, or open lesion noted to the right foot or ankle.     Assessment:    Diagnosis Orders   1. Plantar fasciitis of right foot  External Referral To Physical Therapy      2. Pain in right foot  External Referral To Physical Therapy            Plan: 1. Clinical evaluation of the patient. 2.  Patient given a referral for physical therapy for evaluation and treatment of plantar fasciitis.  Patient encouraged to continue with icing and stretching at home.  Patient to continue with her Power Step inserts as well.  3. Return in about 6 weeks (around 11/19/2024) for Evaluation of plantar fasciitis.   10/8/2024      Refugio Parker DPM

## 2024-10-09 ENCOUNTER — OFFICE VISIT (OUTPATIENT)
Dept: OBGYN CLINIC | Age: 64
End: 2024-10-09
Payer: MEDICARE

## 2024-10-09 VITALS
DIASTOLIC BLOOD PRESSURE: 84 MMHG | HEIGHT: 64 IN | WEIGHT: 180 LBS | BODY MASS INDEX: 30.73 KG/M2 | HEART RATE: 76 BPM | SYSTOLIC BLOOD PRESSURE: 120 MMHG

## 2024-10-09 DIAGNOSIS — Z90.710 STATUS POST HYSTERECTOMY: ICD-10-CM

## 2024-10-09 DIAGNOSIS — Z80.42 FAMILY HX OF PROSTATE CANCER: ICD-10-CM

## 2024-10-09 DIAGNOSIS — Z01.419 WELL WOMAN EXAM: Primary | ICD-10-CM

## 2024-10-09 DIAGNOSIS — N81.10 CYSTOCELE WITH RECTOCELE: ICD-10-CM

## 2024-10-09 DIAGNOSIS — N81.6 CYSTOCELE WITH RECTOCELE: ICD-10-CM

## 2024-10-09 DIAGNOSIS — R21 RASH: ICD-10-CM

## 2024-10-09 DIAGNOSIS — Z80.3 FAMILY HISTORY OF BREAST CANCER: ICD-10-CM

## 2024-10-09 DIAGNOSIS — Z12.31 SCREENING MAMMOGRAM FOR BREAST CANCER: ICD-10-CM

## 2024-10-09 DIAGNOSIS — N39.3 STRESS INCONTINENCE OF URINE: ICD-10-CM

## 2024-10-09 PROCEDURE — 1036F TOBACCO NON-USER: CPT | Performed by: CLINICAL NURSE SPECIALIST

## 2024-10-09 PROCEDURE — 3079F DIAST BP 80-89 MM HG: CPT | Performed by: CLINICAL NURSE SPECIALIST

## 2024-10-09 PROCEDURE — G8417 CALC BMI ABV UP PARAM F/U: HCPCS | Performed by: CLINICAL NURSE SPECIALIST

## 2024-10-09 PROCEDURE — G8400 PT W/DXA NO RESULTS DOC: HCPCS | Performed by: CLINICAL NURSE SPECIALIST

## 2024-10-09 PROCEDURE — G8484 FLU IMMUNIZE NO ADMIN: HCPCS | Performed by: CLINICAL NURSE SPECIALIST

## 2024-10-09 PROCEDURE — 99203 OFFICE O/P NEW LOW 30 MIN: CPT | Performed by: CLINICAL NURSE SPECIALIST

## 2024-10-09 PROCEDURE — 3074F SYST BP LT 130 MM HG: CPT | Performed by: CLINICAL NURSE SPECIALIST

## 2024-10-09 PROCEDURE — 1090F PRES/ABSN URINE INCON ASSESS: CPT | Performed by: CLINICAL NURSE SPECIALIST

## 2024-10-09 PROCEDURE — 3017F COLORECTAL CA SCREEN DOC REV: CPT | Performed by: CLINICAL NURSE SPECIALIST

## 2024-10-09 PROCEDURE — G0101 CA SCREEN;PELVIC/BREAST EXAM: HCPCS | Performed by: CLINICAL NURSE SPECIALIST

## 2024-10-09 PROCEDURE — G8427 DOCREV CUR MEDS BY ELIG CLIN: HCPCS | Performed by: CLINICAL NURSE SPECIALIST

## 2024-10-09 NOTE — PROGRESS NOTES
Dermatology referral faxed with confirmation page.  
(NORVASC) 10 MG tablet Take 1 tablet by mouth daily 90 tablet 1    metoprolol succinate (TOPROL XL) 100 MG extended release tablet take 1 tablet by mouth once daily 90 tablet 2    valACYclovir (VALTREX) 1 g tablet take 1 tablet by mouth twice a day 14 tablet 1    Omega-3 Fatty Acids (FISH OIL) 1200 MG CAPS Take by mouth in the morning and at bedtime      Multiple Vitamins-Minerals (THERAPEUTIC MULTIVITAMIN-MINERALS) tablet Take 1 tablet by mouth daily women's 50+ advanced      aspirin 81 MG chewable tablet aspirin 81 mg chewable tablet   Chew 1 tablet every day by oral route.      Cholecalciferol (VITAMIN D) 50 MCG (2000 UT) CAPS capsule Take by mouth      polyethylene glycol (GLYCOLAX) 17 GM/SCOOP powder Please follow instructions provided to you by your provider. 238 g 0    bisacodyl 5 MG EC tablet Please follow instructions given to you by your provider. 4 tablet 0     No current facility-administered medications for this visit.       Allergies:  No Known Allergies    Gynecologic History:  No LMP recorded. Patient has had a hysterectomy.  Sexually Active: Yes  STD History:No  Birth Control: No HYST    OB History    Para Term  AB Living   5 3 3 0 2 3   SAB IAB Ectopic Molar Multiple Live Births   0 0 0 0 0 3     ______________________________________________________________________    Review of Systems    REVIEW OFSYSTEMS:        Constitutional:  Unexpected weight change, extreme fatigue, night sweats              no  Skin:                           Rashes raised red which has been ongoing intermittently for the past 1 yr, moles   yes  Neurological:  Frequentheadaches, seizures         no  Ophthalmic:  Recent visual changes no  ENT:   Difficulty swallowing  no  Breast:              Masses, pain, nipple discharge                            no     Respiratory:  Shortness of breath, coughing           no    Cardiovascular: Chest pain   no     Gastrointestinal: Chronic diarrhea/constipation and is seeing

## 2024-10-11 ENCOUNTER — HOSPITAL ENCOUNTER (OUTPATIENT)
Dept: WOMENS IMAGING | Age: 64
Discharge: HOME | End: 2024-10-13
Payer: MEDICARE

## 2024-10-11 VITALS — HEIGHT: 64 IN | BODY MASS INDEX: 30.73 KG/M2 | WEIGHT: 180 LBS

## 2024-10-11 DIAGNOSIS — Z78.0 POST-MENOPAUSAL: ICD-10-CM

## 2024-10-11 DIAGNOSIS — Z12.31 ENCOUNTER FOR SCREENING MAMMOGRAM FOR MALIGNANT NEOPLASM OF BREAST: ICD-10-CM

## 2024-10-11 DIAGNOSIS — Z12.31 SCREENING MAMMOGRAM FOR BREAST CANCER: ICD-10-CM

## 2024-10-11 PROCEDURE — 77080 DXA BONE DENSITY AXIAL: CPT

## 2024-10-11 PROCEDURE — 77063 BREAST TOMOSYNTHESIS BI: CPT

## 2024-10-17 ENCOUNTER — TELEPHONE (OUTPATIENT)
Dept: PAIN MANAGEMENT | Age: 64
End: 2024-10-17

## 2024-10-17 NOTE — TELEPHONE ENCOUNTER
I LM on patient's identified VM her appointment has been changed from 10/18 at 0815 to 10/20 at 0830, d/t provider being out of the office.      I asked patient to call if date and time are not convenient.

## 2024-10-31 ENCOUNTER — PROCEDURE VISIT (OUTPATIENT)
Dept: OBGYN CLINIC | Age: 64
End: 2024-10-31
Payer: MEDICARE

## 2024-10-31 VITALS
HEART RATE: 81 BPM | WEIGHT: 183 LBS | BODY MASS INDEX: 31.24 KG/M2 | DIASTOLIC BLOOD PRESSURE: 82 MMHG | SYSTOLIC BLOOD PRESSURE: 130 MMHG | HEIGHT: 64 IN

## 2024-10-31 DIAGNOSIS — Z90.710 STATUS POST HYSTERECTOMY: ICD-10-CM

## 2024-10-31 DIAGNOSIS — N81.6 CYSTOCELE WITH RECTOCELE: Primary | ICD-10-CM

## 2024-10-31 DIAGNOSIS — N39.41 URGE INCONTINENCE: ICD-10-CM

## 2024-10-31 DIAGNOSIS — N81.10 CYSTOCELE WITH RECTOCELE: Primary | ICD-10-CM

## 2024-10-31 DIAGNOSIS — N39.3 STRESS INCONTINENCE OF URINE: ICD-10-CM

## 2024-10-31 PROCEDURE — G8399 PT W/DXA RESULTS DOCUMENT: HCPCS | Performed by: STUDENT IN AN ORGANIZED HEALTH CARE EDUCATION/TRAINING PROGRAM

## 2024-10-31 PROCEDURE — 1123F ACP DISCUSS/DSCN MKR DOCD: CPT | Performed by: STUDENT IN AN ORGANIZED HEALTH CARE EDUCATION/TRAINING PROGRAM

## 2024-10-31 PROCEDURE — G8484 FLU IMMUNIZE NO ADMIN: HCPCS | Performed by: STUDENT IN AN ORGANIZED HEALTH CARE EDUCATION/TRAINING PROGRAM

## 2024-10-31 PROCEDURE — 3017F COLORECTAL CA SCREEN DOC REV: CPT | Performed by: STUDENT IN AN ORGANIZED HEALTH CARE EDUCATION/TRAINING PROGRAM

## 2024-10-31 PROCEDURE — 3075F SYST BP GE 130 - 139MM HG: CPT | Performed by: STUDENT IN AN ORGANIZED HEALTH CARE EDUCATION/TRAINING PROGRAM

## 2024-10-31 PROCEDURE — G8427 DOCREV CUR MEDS BY ELIG CLIN: HCPCS | Performed by: STUDENT IN AN ORGANIZED HEALTH CARE EDUCATION/TRAINING PROGRAM

## 2024-10-31 PROCEDURE — 1090F PRES/ABSN URINE INCON ASSESS: CPT | Performed by: STUDENT IN AN ORGANIZED HEALTH CARE EDUCATION/TRAINING PROGRAM

## 2024-10-31 PROCEDURE — G8417 CALC BMI ABV UP PARAM F/U: HCPCS | Performed by: STUDENT IN AN ORGANIZED HEALTH CARE EDUCATION/TRAINING PROGRAM

## 2024-10-31 PROCEDURE — 3079F DIAST BP 80-89 MM HG: CPT | Performed by: STUDENT IN AN ORGANIZED HEALTH CARE EDUCATION/TRAINING PROGRAM

## 2024-10-31 PROCEDURE — 1036F TOBACCO NON-USER: CPT | Performed by: STUDENT IN AN ORGANIZED HEALTH CARE EDUCATION/TRAINING PROGRAM

## 2024-10-31 PROCEDURE — 0509F URINE INCON PLAN DOCD: CPT | Performed by: STUDENT IN AN ORGANIZED HEALTH CARE EDUCATION/TRAINING PROGRAM

## 2024-10-31 PROCEDURE — 99213 OFFICE O/P EST LOW 20 MIN: CPT | Performed by: STUDENT IN AN ORGANIZED HEALTH CARE EDUCATION/TRAINING PROGRAM

## 2024-11-12 ENCOUNTER — OFFICE VISIT (OUTPATIENT)
Dept: ORTHOPEDIC SURGERY | Age: 64
End: 2024-11-12
Payer: MEDICARE

## 2024-11-12 VITALS — WEIGHT: 183 LBS | BODY MASS INDEX: 31.24 KG/M2 | HEIGHT: 64 IN | RESPIRATION RATE: 14 BRPM

## 2024-11-12 DIAGNOSIS — M54.41 CHRONIC RIGHT-SIDED LOW BACK PAIN WITH RIGHT-SIDED SCIATICA: Primary | ICD-10-CM

## 2024-11-12 DIAGNOSIS — G89.29 CHRONIC RIGHT-SIDED LOW BACK PAIN WITH RIGHT-SIDED SCIATICA: Primary | ICD-10-CM

## 2024-11-12 PROCEDURE — G8484 FLU IMMUNIZE NO ADMIN: HCPCS | Performed by: ORTHOPAEDIC SURGERY

## 2024-11-12 PROCEDURE — 1123F ACP DISCUSS/DSCN MKR DOCD: CPT | Performed by: ORTHOPAEDIC SURGERY

## 2024-11-12 PROCEDURE — 1036F TOBACCO NON-USER: CPT | Performed by: ORTHOPAEDIC SURGERY

## 2024-11-12 PROCEDURE — 3017F COLORECTAL CA SCREEN DOC REV: CPT | Performed by: ORTHOPAEDIC SURGERY

## 2024-11-12 PROCEDURE — G8428 CUR MEDS NOT DOCUMENT: HCPCS | Performed by: ORTHOPAEDIC SURGERY

## 2024-11-12 PROCEDURE — G8417 CALC BMI ABV UP PARAM F/U: HCPCS | Performed by: ORTHOPAEDIC SURGERY

## 2024-11-12 PROCEDURE — 99213 OFFICE O/P EST LOW 20 MIN: CPT | Performed by: ORTHOPAEDIC SURGERY

## 2024-11-12 PROCEDURE — 1090F PRES/ABSN URINE INCON ASSESS: CPT | Performed by: ORTHOPAEDIC SURGERY

## 2024-11-12 PROCEDURE — G8399 PT W/DXA RESULTS DOCUMENT: HCPCS | Performed by: ORTHOPAEDIC SURGERY

## 2024-11-12 NOTE — PROGRESS NOTES
use cannabis gummies for anxiety and pain     Past Surgical History:   Procedure Laterality Date    APPENDECTOMY      COLONOSCOPY N/A 2024    COLONOSCOPY POLYPECTOMY HOT SNARE BIOPSY WITH EPI INJECTION AND CLIP APPLICATION X 4 performed by Yinka Crouch MD at [facility]    FINGER TRIGGER RELEASE Right     thumb    HYSTERECTOMY, TOTAL ABDOMINAL (CERVIX REMOVED)      SACRAL NERVE STIMULATOR PLACEMENT      peripheral nerve stimulator for pain management    UPPER GASTROINTESTINAL ENDOSCOPY N/A 2022    EGD BIOPSY performed by Eulalia Fajardo MD at [facility] OR     Family History   Problem Relation Age of Onset    Cancer Mother     Breast Cancer Mother 55    Diabetes Father     High Blood Pressure Father     High Cholesterol Father     Alcohol Abuse Father     Heart Attack Father          heart attack at 90 yrs old    Substance Abuse Father         Alcohol    Hypertension Father     Cancer Brother         prostate    Alcohol Abuse Brother     Arthritis Brother     High Blood Pressure Brother     High Cholesterol Brother     Substance Abuse Brother         Alcohol        Physical Exam:  Vitals signs and nursing note reviewed.   Constitutional:       Appearance: well-developed.   HENT:      Head: Normocephalic and atraumatic.      Nose: Nose normal.   Eyes:      Conjunctiva/sclera: Conjunctivae normal.   Neck:      Musculoskeletal: Normal range of motion and neck supple.   Pulmonary:      Effort: Pulmonary effort is normal. No respiratory distress.   Musculoskeletal:      Comments: Normal gait     Skin:     General: Skin is warm and dry.   Neurological:      Mental Status: Alert and oriented to person, place, and time.      Sensory: No sensory deficit.   Psychiatric:         Behavior: Behavior normal.         Thought Content: Thought content normal.        Provider Attestation:  Kulwinder CASON, personally performed the services described in this documentation. All medical record entries made by the

## 2024-11-19 ENCOUNTER — OFFICE VISIT (OUTPATIENT)
Dept: PODIATRY | Age: 64
End: 2024-11-19
Payer: MEDICARE

## 2024-11-19 ENCOUNTER — PREP FOR PROCEDURE (OUTPATIENT)
Dept: PODIATRY | Age: 64
End: 2024-11-19

## 2024-11-19 VITALS — BODY MASS INDEX: 31.24 KG/M2 | HEIGHT: 64 IN | WEIGHT: 183 LBS

## 2024-11-19 DIAGNOSIS — M72.2 PLANTAR FASCIITIS OF RIGHT FOOT: Primary | ICD-10-CM

## 2024-11-19 DIAGNOSIS — M72.2 PLANTAR FASCIITIS: ICD-10-CM

## 2024-11-19 DIAGNOSIS — M79.671 PAIN IN RIGHT FOOT: ICD-10-CM

## 2024-11-19 PROCEDURE — 3017F COLORECTAL CA SCREEN DOC REV: CPT | Performed by: PODIATRIST

## 2024-11-19 PROCEDURE — G8417 CALC BMI ABV UP PARAM F/U: HCPCS | Performed by: PODIATRIST

## 2024-11-19 PROCEDURE — 99213 OFFICE O/P EST LOW 20 MIN: CPT | Performed by: PODIATRIST

## 2024-11-19 PROCEDURE — 1090F PRES/ABSN URINE INCON ASSESS: CPT | Performed by: PODIATRIST

## 2024-11-19 PROCEDURE — 1123F ACP DISCUSS/DSCN MKR DOCD: CPT | Performed by: PODIATRIST

## 2024-11-19 PROCEDURE — G8399 PT W/DXA RESULTS DOCUMENT: HCPCS | Performed by: PODIATRIST

## 2024-11-19 PROCEDURE — 1036F TOBACCO NON-USER: CPT | Performed by: PODIATRIST

## 2024-11-19 PROCEDURE — G8484 FLU IMMUNIZE NO ADMIN: HCPCS | Performed by: PODIATRIST

## 2024-11-19 PROCEDURE — G8427 DOCREV CUR MEDS BY ELIG CLIN: HCPCS | Performed by: PODIATRIST

## 2024-11-19 ASSESSMENT — ENCOUNTER SYMPTOMS
DIARRHEA: 0
BACK PAIN: 0
NAUSEA: 0
SHORTNESS OF BREATH: 0
COLOR CHANGE: 0

## 2024-11-19 NOTE — PROGRESS NOTES
right calcaneus. Tinel's sign is negative to the right tarsal tunnel. There is no erythema, calor, or open lesion noted to the right foot or ankle.     Assessment:    Diagnosis Orders   1. Plantar fasciitis of right foot        2. Pain in right foot              Plan: 1. Clinical evaluation of the patient. 2.  Patient informed that based on her symptoms and lack of response to conservative treatment, as well as my clinical findings, I recommend surgery to perform an endoscopic plantar fasciotomy of the right foot.  Patient agreed and will schedule this with the front office.  3. Return if symptoms worsen or fail to improve, for Patient to schedule surgery.   11/19/2024      Refugio Parker DPM

## 2024-11-20 ENCOUNTER — HOSPITAL ENCOUNTER (OUTPATIENT)
Dept: PAIN MANAGEMENT | Age: 64
Discharge: HOME | End: 2024-11-20
Payer: MEDICARE

## 2024-11-20 VITALS — WEIGHT: 183 LBS | HEIGHT: 64 IN | BODY MASS INDEX: 31.24 KG/M2

## 2024-11-20 DIAGNOSIS — M53.3 SACROILIAC JOINT PAIN: ICD-10-CM

## 2024-11-20 DIAGNOSIS — M51.369 DEGENERATION OF INTERVERTEBRAL DISC OF LUMBAR REGION, UNSPECIFIED WHETHER PAIN PRESENT: ICD-10-CM

## 2024-11-20 DIAGNOSIS — M54.16 LUMBAR RADICULOPATHY: ICD-10-CM

## 2024-11-20 DIAGNOSIS — M47.817 LUMBOSACRAL SPONDYLOSIS WITHOUT MYELOPATHY: ICD-10-CM

## 2024-11-20 DIAGNOSIS — G89.29 CHRONIC GLUTEAL PAIN: Primary | ICD-10-CM

## 2024-11-20 DIAGNOSIS — M79.18 CHRONIC GLUTEAL PAIN: Primary | ICD-10-CM

## 2024-11-20 PROCEDURE — 99213 OFFICE O/P EST LOW 20 MIN: CPT | Performed by: STUDENT IN AN ORGANIZED HEALTH CARE EDUCATION/TRAINING PROGRAM

## 2024-11-20 PROCEDURE — 99213 OFFICE O/P EST LOW 20 MIN: CPT

## 2024-11-20 ASSESSMENT — PAIN SCALES - GENERAL: PAINLEVEL_OUTOF10: 0

## 2024-11-20 NOTE — PROGRESS NOTES
Chronic Pain Clinic Note     Encounter Date: 11/20/2024     SUBJECTIVE:  Chief Complaint   Patient presents with    Back Pain       History of Present Illness:   Cynthia J Cantua is a 64 y.o. female who presents with back pain    Medication Refill: n/a    Current Complaints of Pain:   Location: back   Radiation: None  Severity: Moderate  Pain Numerical Score - 0 today   Average: 5     Highest: 8  Lowest: 3  Character/Quality: Complains of pain that is aching, burning, sharp  Timing: Constant  Associated symptoms: none  Numbness: no  Weakness: no  Exacerbating factors: standing sitting  Alleviating factors: laying down  Length of time pain has been present: Started about 6 months ago  Inciting event/injury: no  Bowel/Bladder incontinence: no  Falls: none in the past month   Physical Therapy: about 2/3 weeks ago at [facility]    History of Interventions:   Surgery: No previous lumbar/cervical surgeries  Injections: PNS 09/2024 - pt states 100% pain relief     Imaging:    MRI LUMBAR 3/27/24    FINDINGS:  BONES/ALIGNMENT: Alignment is normal.  No acute fracture is identified.  There is mild multilevel disc desiccation.  Intervertebral disc heights are  maintained.  No spondylolysis.     SPINAL CORD: The conus medullaris is normal in size and signal intensities  and terminates normally.     SOFT TISSUES: No paraspinal mass is identified.     L1-L2: There is no disc bulge or protrusion present.  There is no significant  spinal canal stenosis or neural foraminal narrowing present.     L2-L3: A disc bulge and facet arthropathy is present.  No significant spinal  canal stenosis or neural foraminal narrowing is present.     L3-L4: A disc bulge and facet arthropathy is present.  There is mild left  neural foraminal narrowing.  No spinal canal stenosis or right neural  foraminal narrowing is present.     L4-L5: There is no disc bulge or protrusion present.  There is no significant  spinal canal stenosis or

## 2024-12-02 ENCOUNTER — HOSPITAL ENCOUNTER (OUTPATIENT)
Dept: PREADMISSION TESTING | Age: 64
Setting detail: OUTPATIENT SURGERY
Discharge: HOME | End: 2024-12-06
Payer: MEDICARE

## 2024-12-02 VITALS
DIASTOLIC BLOOD PRESSURE: 91 MMHG | TEMPERATURE: 99.5 F | SYSTOLIC BLOOD PRESSURE: 165 MMHG | WEIGHT: 182 LBS | HEIGHT: 64 IN | RESPIRATION RATE: 20 BRPM | OXYGEN SATURATION: 94 % | HEART RATE: 85 BPM | BODY MASS INDEX: 31.07 KG/M2

## 2024-12-02 LAB
ANION GAP SERPL CALCULATED.3IONS-SCNC: 11 MMOL/L (ref 9–16)
BASOPHILS # BLD: 0.1 K/UL (ref 0–0.2)
BASOPHILS NFR BLD: 1 % (ref 0–2)
BUN SERPL-MCNC: 23 MG/DL (ref 8–23)
CALCIUM SERPL-MCNC: 10.6 MG/DL (ref 8.6–10.4)
CHLORIDE SERPL-SCNC: 104 MMOL/L (ref 98–107)
CO2 SERPL-SCNC: 24 MMOL/L (ref 20–31)
CREAT SERPL-MCNC: 1 MG/DL (ref 0.7–1.2)
EOSINOPHIL # BLD: 0.2 K/UL (ref 0–0.4)
EOSINOPHILS RELATIVE PERCENT: 3 % (ref 0–4)
ERYTHROCYTE [DISTWIDTH] IN BLOOD BY AUTOMATED COUNT: 13 % (ref 11.5–14.9)
GFR, ESTIMATED: 63 ML/MIN/1.73M2
GLUCOSE SERPL-MCNC: 112 MG/DL (ref 74–99)
HCT VFR BLD AUTO: 40.7 % (ref 36–46)
HGB BLD-MCNC: 14.1 G/DL (ref 12–16)
LYMPHOCYTES NFR BLD: 2 K/UL (ref 1–4.8)
LYMPHOCYTES RELATIVE PERCENT: 31 % (ref 24–44)
MCH RBC QN AUTO: 30 PG (ref 26–34)
MCHC RBC AUTO-ENTMCNC: 34.6 G/DL (ref 31–37)
MCV RBC AUTO: 86.8 FL (ref 80–100)
MONOCYTES NFR BLD: 0.4 K/UL (ref 0.1–1.3)
MONOCYTES NFR BLD: 7 % (ref 1–7)
NEUTROPHILS NFR BLD: 58 % (ref 36–66)
NEUTS SEG NFR BLD: 3.9 K/UL (ref 1.3–9.1)
PLATELET # BLD AUTO: 415 K/UL (ref 150–450)
PMV BLD AUTO: 8 FL (ref 6–12)
POTASSIUM SERPL-SCNC: 4.2 MMOL/L (ref 3.7–5.3)
RBC # BLD AUTO: 4.69 M/UL (ref 4–5.2)
SODIUM SERPL-SCNC: 139 MMOL/L (ref 136–145)
WBC OTHER # BLD: 6.6 K/UL (ref 3.5–11)

## 2024-12-02 PROCEDURE — 93005 ELECTROCARDIOGRAM TRACING: CPT | Performed by: ANESTHESIOLOGY

## 2024-12-02 PROCEDURE — 80048 BASIC METABOLIC PNL TOTAL CA: CPT

## 2024-12-02 PROCEDURE — 85025 COMPLETE CBC W/AUTO DIFF WBC: CPT

## 2024-12-02 PROCEDURE — 36415 COLL VENOUS BLD VENIPUNCTURE: CPT

## 2024-12-02 ASSESSMENT — PAIN DESCRIPTION - LOCATION: LOCATION: BACK;FOOT

## 2024-12-02 ASSESSMENT — PAIN DESCRIPTION - ORIENTATION: ORIENTATION: RIGHT;LOWER

## 2024-12-02 ASSESSMENT — PAIN DESCRIPTION - PAIN TYPE: TYPE: ACUTE PAIN

## 2024-12-02 ASSESSMENT — PAIN SCALES - GENERAL: PAINLEVEL_OUTOF10: 6

## 2024-12-02 NOTE — DISCHARGE INSTRUCTIONS
Pre-op Instructions For Out-Patient Surgery    Medication Instructions:  Please stop herbs and any supplements now (includes vitamins and minerals).    For these medications:  Dulaglutide (Trulicity), Exenatide (Byetta and Bydureon, Liraglutide (Victoza), Lixisenatide (Adlyxin), Semaglutide (Ozempic and Rybelsus), Tirzepatide (Mounjaro)- Stop 1 week prior if taking weekly or 1 day prior if taking every 12 hours or daily.     Please contact your surgeon and prescribing physician for pre-op instructions for any blood thinners. Stop taking Aspirin as directed    If you have inhalers/aerosol treatments at home, please use them the morning of your surgery and bring the inhalers with you to the hospital.    Please take the following medications the morning of your surgery with a sip of water:    Hydralazine, Metoprolol, Amlodipine, Omeprazole    Surgery Instructions:  After midnight before surgery:  Do not eat or drink anything, including water, mints, gum, and hard candy.  You may brush your teeth without swallowing.  No smoking, chewing tobacco, or street drugs.    Please shower or bathe before surgery.  If you were given Surgical Scrub Chlorhexidine Gluconate Liquid (CHG), please shower the night before and the morning of your surgery following the detailed instructions you received during your pre-admission visit.     Please do not wear any cologne, lotion, powder, deodorant, jewelry, piercings, perfume, makeup, nail polish, hair accessories, or hair spray on the day of surgery.  Wear loose comfortable clothing.    Leave your valuables at home but bring a payment source for any after-surgery prescriptions you plan to fill at [facility].  Bring a storage case for any glasses/contacts.    An adult who is responsible for you MUST drive you home and should be with you for the first 24 hours after surgery.     If having out-patient knee and foot surgeries, please arrange for planned

## 2024-12-03 ENCOUNTER — ANESTHESIA EVENT (OUTPATIENT)
Dept: OPERATING ROOM | Age: 64
End: 2024-12-03
Payer: MEDICARE

## 2024-12-03 NOTE — PRE-PROCEDURE INSTRUCTIONS
No answer, left message ?                             Unable to leave message ?    When were you told to arrive at hospital ?  1830    Do you have a  ?yes    Are you on any blood thinners ?   yes                  If yes when did you stop taking ?11/23/2024    Do you have your prep Rx filled and instruction ?  N/a    Nothing to eat the day before , only clear liquids.n/a    Are you experiencing any covid symptoms ? no    Do you have any infections or rash we should be aware of ?no      Do you have the Hibiclens soap to use the night before and the morning of surgery ?yes    Nothing to eat or drink after midnight, only a sip of water to take any medication instructed to take the night before.yes  Wear comfortable clothing, leave any valuables at home, remove any jewelry and body piercing . yes

## 2024-12-04 ENCOUNTER — HOSPITAL ENCOUNTER (OUTPATIENT)
Age: 64
Setting detail: OUTPATIENT SURGERY
Discharge: HOME | End: 2024-12-04
Attending: PODIATRIST | Admitting: PODIATRIST
Payer: MEDICARE

## 2024-12-04 ENCOUNTER — ANESTHESIA (OUTPATIENT)
Dept: OPERATING ROOM | Age: 64
End: 2024-12-04
Payer: MEDICARE

## 2024-12-04 VITALS
TEMPERATURE: 96.9 F | DIASTOLIC BLOOD PRESSURE: 80 MMHG | HEART RATE: 74 BPM | RESPIRATION RATE: 16 BRPM | HEIGHT: 64 IN | BODY MASS INDEX: 31.07 KG/M2 | OXYGEN SATURATION: 98 % | WEIGHT: 182 LBS | SYSTOLIC BLOOD PRESSURE: 136 MMHG

## 2024-12-04 DIAGNOSIS — M79.671 RIGHT FOOT PAIN: ICD-10-CM

## 2024-12-04 DIAGNOSIS — M72.2 PLANTAR FASCIITIS: Primary | ICD-10-CM

## 2024-12-04 DIAGNOSIS — Z74.09 PROLONGED IMMOBILIZATION: ICD-10-CM

## 2024-12-04 DIAGNOSIS — Z98.890 POST-OPERATIVE STATE: ICD-10-CM

## 2024-12-04 DIAGNOSIS — Z91.89 AT HIGH RISK FOR DEEP VENOUS THROMBOSIS: ICD-10-CM

## 2024-12-04 PROCEDURE — 6360000002 HC RX W HCPCS: Performed by: NURSE ANESTHETIST, CERTIFIED REGISTERED

## 2024-12-04 PROCEDURE — 7100000030 HC ASPR PHASE II RECOVERY - FIRST 15 MIN: Performed by: PODIATRIST

## 2024-12-04 PROCEDURE — 29893 ENDOSCOPIC PLANTR FASCIOTOMY: CPT | Performed by: PODIATRIST

## 2024-12-04 PROCEDURE — 2580000003 HC RX 258: Performed by: ANESTHESIOLOGY

## 2024-12-04 PROCEDURE — 3700000000 HC ANESTHESIA ATTENDED CARE: Performed by: PODIATRIST

## 2024-12-04 PROCEDURE — 6360000002 HC RX W HCPCS: Performed by: PODIATRIST

## 2024-12-04 PROCEDURE — 3600000013 HC SURGERY LEVEL 3 ADDTL 15MIN: Performed by: PODIATRIST

## 2024-12-04 PROCEDURE — 7100000000 HC PACU RECOVERY - FIRST 15 MIN: Performed by: PODIATRIST

## 2024-12-04 PROCEDURE — 2709999900 HC NON-CHARGEABLE SUPPLY: Performed by: PODIATRIST

## 2024-12-04 PROCEDURE — 7100000031 HC ASPR PHASE II RECOVERY - ADDTL 15 MIN: Performed by: PODIATRIST

## 2024-12-04 PROCEDURE — 7100000001 HC PACU RECOVERY - ADDTL 15 MIN: Performed by: PODIATRIST

## 2024-12-04 PROCEDURE — 6370000000 HC RX 637 (ALT 250 FOR IP): Performed by: ANESTHESIOLOGY

## 2024-12-04 PROCEDURE — 7100000011 HC PHASE II RECOVERY - ADDTL 15 MIN: Performed by: PODIATRIST

## 2024-12-04 PROCEDURE — 3700000001 HC ADD 15 MINUTES (ANESTHESIA): Performed by: PODIATRIST

## 2024-12-04 PROCEDURE — 3600000003 HC SURGERY LEVEL 3 BASE: Performed by: PODIATRIST

## 2024-12-04 PROCEDURE — 7100000010 HC PHASE II RECOVERY - FIRST 15 MIN: Performed by: PODIATRIST

## 2024-12-04 RX ORDER — MIDAZOLAM HYDROCHLORIDE 1 MG/ML
INJECTION, SOLUTION INTRAMUSCULAR; INTRAVENOUS
Status: DISCONTINUED | OUTPATIENT
Start: 2024-12-04 | End: 2024-12-04 | Stop reason: SDUPTHER

## 2024-12-04 RX ORDER — LIDOCAINE HYDROCHLORIDE 10 MG/ML
1 INJECTION, SOLUTION EPIDURAL; INFILTRATION; INTRACAUDAL; PERINEURAL
Status: DISCONTINUED | OUTPATIENT
Start: 2024-12-04 | End: 2024-12-04 | Stop reason: HOSPADM

## 2024-12-04 RX ORDER — LIDOCAINE HYDROCHLORIDE 10 MG/ML
INJECTION, SOLUTION EPIDURAL; INFILTRATION; INTRACAUDAL; PERINEURAL
Status: DISCONTINUED | OUTPATIENT
Start: 2024-12-04 | End: 2024-12-04 | Stop reason: SDUPTHER

## 2024-12-04 RX ORDER — FENTANYL CITRATE 50 UG/ML
INJECTION, SOLUTION INTRAMUSCULAR; INTRAVENOUS
Status: DISCONTINUED | OUTPATIENT
Start: 2024-12-04 | End: 2024-12-04 | Stop reason: SDUPTHER

## 2024-12-04 RX ORDER — SODIUM CHLORIDE 9 MG/ML
INJECTION, SOLUTION INTRAVENOUS PRN
Status: DISCONTINUED | OUTPATIENT
Start: 2024-12-04 | End: 2024-12-04 | Stop reason: HOSPADM

## 2024-12-04 RX ORDER — OXYCODONE AND ACETAMINOPHEN 5; 325 MG/1; MG/1
1 TABLET ORAL EVERY 4 HOURS PRN
Qty: 30 TABLET | Refills: 0 | Status: SHIPPED | OUTPATIENT
Start: 2024-12-04 | End: 2024-12-11

## 2024-12-04 RX ORDER — RIVAROXABAN 10 MG/1
10 TABLET, FILM COATED ORAL
Qty: 30 TABLET | Refills: 1 | Status: SHIPPED | OUTPATIENT
Start: 2024-12-04

## 2024-12-04 RX ORDER — SODIUM CHLORIDE 0.9 % (FLUSH) 0.9 %
5-40 SYRINGE (ML) INJECTION EVERY 12 HOURS SCHEDULED
Status: DISCONTINUED | OUTPATIENT
Start: 2024-12-04 | End: 2024-12-04 | Stop reason: HOSPADM

## 2024-12-04 RX ORDER — DEXAMETHASONE SODIUM PHOSPHATE 4 MG/ML
INJECTION, SOLUTION INTRA-ARTICULAR; INTRALESIONAL; INTRAMUSCULAR; INTRAVENOUS; SOFT TISSUE
Status: DISCONTINUED | OUTPATIENT
Start: 2024-12-04 | End: 2024-12-04 | Stop reason: SDUPTHER

## 2024-12-04 RX ORDER — SODIUM CHLORIDE 0.9 % (FLUSH) 0.9 %
5-40 SYRINGE (ML) INJECTION PRN
Status: DISCONTINUED | OUTPATIENT
Start: 2024-12-04 | End: 2024-12-04 | Stop reason: HOSPADM

## 2024-12-04 RX ORDER — DOXYCYCLINE 100 MG/1
100 CAPSULE ORAL DAILY
Qty: 10 CAPSULE | Refills: 0 | Status: SHIPPED | OUTPATIENT
Start: 2024-12-04 | End: 2024-12-14

## 2024-12-04 RX ORDER — NALOXONE HYDROCHLORIDE 0.4 MG/ML
INJECTION, SOLUTION INTRAMUSCULAR; INTRAVENOUS; SUBCUTANEOUS PRN
Status: DISCONTINUED | OUTPATIENT
Start: 2024-12-04 | End: 2024-12-04 | Stop reason: HOSPADM

## 2024-12-04 RX ORDER — ACETAMINOPHEN 500 MG
1000 TABLET ORAL ONCE
Status: COMPLETED | OUTPATIENT
Start: 2024-12-04 | End: 2024-12-04

## 2024-12-04 RX ORDER — GABAPENTIN 100 MG/1
100 CAPSULE ORAL ONCE
Status: COMPLETED | OUTPATIENT
Start: 2024-12-04 | End: 2024-12-04

## 2024-12-04 RX ORDER — SODIUM CHLORIDE 9 MG/ML
INJECTION, SOLUTION INTRAVENOUS CONTINUOUS
Status: DISCONTINUED | OUTPATIENT
Start: 2024-12-04 | End: 2024-12-04 | Stop reason: HOSPADM

## 2024-12-04 RX ORDER — BUPIVACAINE HYDROCHLORIDE 5 MG/ML
INJECTION, SOLUTION EPIDURAL; INTRACAUDAL PRN
Status: DISCONTINUED | OUTPATIENT
Start: 2024-12-04 | End: 2024-12-04 | Stop reason: ALTCHOICE

## 2024-12-04 RX ORDER — DIPHENHYDRAMINE HYDROCHLORIDE 50 MG/ML
12.5 INJECTION INTRAMUSCULAR; INTRAVENOUS
Status: DISCONTINUED | OUTPATIENT
Start: 2024-12-04 | End: 2024-12-04 | Stop reason: HOSPADM

## 2024-12-04 RX ORDER — ONDANSETRON 2 MG/ML
INJECTION INTRAMUSCULAR; INTRAVENOUS
Status: DISCONTINUED | OUTPATIENT
Start: 2024-12-04 | End: 2024-12-04 | Stop reason: SDUPTHER

## 2024-12-04 RX ORDER — FENTANYL CITRATE 0.05 MG/ML
25 INJECTION, SOLUTION INTRAMUSCULAR; INTRAVENOUS EVERY 5 MIN PRN
Status: DISCONTINUED | OUTPATIENT
Start: 2024-12-04 | End: 2024-12-04 | Stop reason: HOSPADM

## 2024-12-04 RX ORDER — METOCLOPRAMIDE HYDROCHLORIDE 5 MG/ML
10 INJECTION INTRAMUSCULAR; INTRAVENOUS
Status: DISCONTINUED | OUTPATIENT
Start: 2024-12-04 | End: 2024-12-04 | Stop reason: HOSPADM

## 2024-12-04 RX ORDER — PROPOFOL 10 MG/ML
INJECTION, EMULSION INTRAVENOUS
Status: DISCONTINUED | OUTPATIENT
Start: 2024-12-04 | End: 2024-12-04 | Stop reason: SDUPTHER

## 2024-12-04 RX ORDER — ONDANSETRON 2 MG/ML
INJECTION INTRAMUSCULAR; INTRAVENOUS
Status: DISCONTINUED | OUTPATIENT
Start: 2024-12-04 | End: 2024-12-04

## 2024-12-04 RX ORDER — ONDANSETRON 2 MG/ML
4 INJECTION INTRAMUSCULAR; INTRAVENOUS
Status: DISCONTINUED | OUTPATIENT
Start: 2024-12-04 | End: 2024-12-04 | Stop reason: HOSPADM

## 2024-12-04 RX ADMIN — PHENYLEPHRINE HYDROCHLORIDE 100 MCG: 10 INJECTION INTRAVENOUS at 14:36

## 2024-12-04 RX ADMIN — SODIUM CHLORIDE: 9 INJECTION, SOLUTION INTRAVENOUS at 14:15

## 2024-12-04 RX ADMIN — DEXAMETHASONE SODIUM PHOSPHATE 8 MG: 4 INJECTION INTRA-ARTICULAR; INTRALESIONAL; INTRAMUSCULAR; INTRAVENOUS; SOFT TISSUE at 14:23

## 2024-12-04 RX ADMIN — SODIUM CHLORIDE, PRESERVATIVE FREE 10 ML: 5 INJECTION INTRAVENOUS at 12:26

## 2024-12-04 RX ADMIN — ONDANSETRON 4 MG: 2 INJECTION, SOLUTION INTRAMUSCULAR; INTRAVENOUS at 14:39

## 2024-12-04 RX ADMIN — GABAPENTIN 100 MG: 100 CAPSULE ORAL at 12:15

## 2024-12-04 RX ADMIN — MIDAZOLAM 1 MG: 1 INJECTION INTRAMUSCULAR; INTRAVENOUS at 14:14

## 2024-12-04 RX ADMIN — PHENYLEPHRINE HYDROCHLORIDE 100 MCG: 10 INJECTION INTRAVENOUS at 14:41

## 2024-12-04 RX ADMIN — LIDOCAINE HYDROCHLORIDE 50 MG: 10 INJECTION, SOLUTION EPIDURAL; INFILTRATION; INTRACAUDAL; PERINEURAL at 14:18

## 2024-12-04 RX ADMIN — PROPOFOL 170 MG: 10 INJECTION, EMULSION INTRAVENOUS at 14:18

## 2024-12-04 RX ADMIN — ACETAMINOPHEN 1000 MG: 500 TABLET ORAL at 12:15

## 2024-12-04 RX ADMIN — PHENYLEPHRINE HYDROCHLORIDE 100 MCG: 10 INJECTION INTRAVENOUS at 14:32

## 2024-12-04 RX ADMIN — FENTANYL CITRATE 50 MCG: 50 INJECTION INTRAMUSCULAR; INTRAVENOUS at 14:18

## 2024-12-04 ASSESSMENT — ENCOUNTER SYMPTOMS
GASTROINTESTINAL NEGATIVE: 1
ABDOMINAL PAIN: 0
TROUBLE SWALLOWING: 0
APNEA: 0
SHORTNESS OF BREATH: 0
COUGH: 0
BACK PAIN: 1
SORE THROAT: 0

## 2024-12-04 ASSESSMENT — PAIN - FUNCTIONAL ASSESSMENT
PAIN_FUNCTIONAL_ASSESSMENT: 0-10
PAIN_FUNCTIONAL_ASSESSMENT: NONE - DENIES PAIN
PAIN_FUNCTIONAL_ASSESSMENT: 0-10

## 2024-12-04 ASSESSMENT — PAIN SCALES - GENERAL
PAINLEVEL_OUTOF10: 0
PAINLEVEL_OUTOF10: 0
PAINLEVEL_OUTOF10: 7
PAINLEVEL_OUTOF10: 0

## 2024-12-04 ASSESSMENT — PAIN DESCRIPTION - DESCRIPTORS
DESCRIPTORS: ACHING;DISCOMFORT
DESCRIPTORS: ACHING;DISCOMFORT

## 2024-12-04 ASSESSMENT — PAIN DESCRIPTION - LOCATION: LOCATION: FOOT

## 2024-12-04 ASSESSMENT — PAIN DESCRIPTION - ORIENTATION: ORIENTATION: RIGHT

## 2024-12-04 NOTE — BRIEF OP NOTE
Brief Postoperative Note      Patient: Cynthia J Cantua  YOB: 1960  MRN: [id number]    Date of Procedure: 12/4/2024    Pre-Op Diagnosis Codes:      * Plantar fasciitis [M72.2]    Post-Op Diagnosis: Same       Procedure(s):  ENDOSCOPIC PLANTAR FASCIOTOMY    Surgeon(s):  Refugio Parker DPM    Assistant:  * No surgical staff found *    Anesthesia: Monitor Anesthesia Care    Estimated Blood Loss (mL): Minimal    Complications: None    Specimens:   * No specimens in log *    Implants:  * No implants in log *      Drains: * No LDAs found *    Findings:  Infection Present At Time Of Surgery (PATOS) (choose all levels that have infection present):  No infection present  Other Findings: None    Electronically signed by Refugio Parker DPM on 12/4/2024 at 2:52 PM

## 2024-12-04 NOTE — ANESTHESIA PRE PROCEDURE
Department of Anesthesiology  Preprocedure Note       Name:  Cynthia J Cantua   Age:  64 y.o.  :  1960                                          MRN:  [id number]         Date:  2024      Surgeon: Surgeon(s):  Refugio Parker DPM    Procedure: Procedure(s):  ENDOSCOPIC PLANTAR FASCIOTOMY    Medications prior to admission:   Prior to Admission medications    Medication Sig Start Date End Date Taking? Authorizing Provider   gabapentin (NEURONTIN) 100 MG capsule Take 1 capsule by mouth 2 times daily for 90 days. 12/1/24 3/1/25 Yes Mary Jean MD   metoprolol succinate (TOPROL XL) 100 MG extended release tablet take 1 tablet by mouth once daily 24  Yes Tamara Muller APRN - CNP   amLODIPine (NORVASC) 10 MG tablet Take 1 tablet by mouth daily 10/21/24  Yes Brant Maldonado MD   buPROPion (WELLBUTRIN SR) 150 MG extended release tablet take 1 tablet by mouth twice a day 24  Yes Tamara Muller APRN - CNP   fenofibrate (TRIGLIDE) 160 MG tablet Take 1 tablet by mouth daily 24  Yes Tamara Muller APRN - CNP   hydrALAZINE (APRESOLINE) 50 MG tablet take 1 tablet by mouth three times a day 24  Yes Tamara Muller APRN - CNP   atorvastatin (LIPITOR) 40 MG tablet take 1 tablet by mouth at bedtime 24  Yes Tamara Muller APRN - CNP   valACYclovir (VALTREX) 1 g tablet take 1 tablet by mouth twice a day  Patient taking differently: Take 1 tablet by mouth 2 times daily as needed (for shingles) 23  Yes Tamara Muller APRN - CNP   Cholecalciferol (VITAMIN D) 50 MCG ( UT) CAPS capsule Take by mouth daily   Yes Derrick Cadet MD   polyethylene glycol (GLYCOLAX) 17 GM/SCOOP powder Take as directed by physician office for bowel prep 10/9/24   Yinka Crouch MD   bisacodyl 5 MG EC tablet Take as directed by physician office for bowel prep 10/9/24   Yinka Crouch MD   omeprazole (PRILOSEC) 20 MG delayed release capsule TAKE 1 CAPSULE BY MOUTH 2 TIMES A DAY 24

## 2024-12-04 NOTE — DISCHARGE INSTRUCTIONS
No Weight Bearing Right Lower Extremity with crutch assist  Ice and elevate right foot  Keep dressing clean, dry, and intact    DISCHARGE INSTRUCTIONS FOR FOOT SURGERY    In order to continue your care at home, please follow the instructions below.    For General Anesthesia  Do not drink any alcoholic beverages or make any legal or important decisions or drive for 24 hours.     Diet    After general anesthesia, start out eating lightly (broth, soup, crackers, toast, etc.) advancing as tolerated to your usual diet.  Try to avoid spicy or greasy/fatty foods for 24 hours.  Drink plenty of fluids after surgery, unless you are on a fluid restriction.  Avoid milk/milk product for several hours.      Medications  Take medications as instructed by your surgeon.   Please do not take prescribed pain medication with alcoholic beverages.  When cleared to drive by your surgeon, please do not drive or operate machinery while taking any prescribed pain medication.    Activities  As instructed by your surgeon.  Limit your activities for 24 hours.  Avoid heavy work or sports until surgeon approves.   No lifting, pushing, pulling, or twisting until surgeon approves.  No driving or operating machinery until cleared by surgeon.  You may put weight on your foot as follows: Non weight bearing.          Surgery Area or Examination Site  Always keep dressings/incisions clean and dry.  Do not remove dressings.  Apply ice pack 20-30 min 4 times a day for 48 hours to help decrease swelling and pain.  Make sure to have a piece of cloth between the ice bag and your skin.  Keep affected foot(feet) elevated above heart level with leg supported with pillows.    Call your surgeon for the following:  You have pain that does not get better after you take pain medicine.   For an oral temperature (by mouth) is 101 degrees or higher.  You have increasing and progressive bleeding or drainage from surgery site.  Signs of an infection:  increased swelling,

## 2024-12-04 NOTE — H&P
HISTORY and PHYSICAL  [facility]       NAME:  Cynthia J Cantua  MRN: [id number]   YOB: 1960   Date: 12/4/2024   Age: 64 y.o.  Gender: female       COMPLAINT AND PRESENT HISTORY:     Cynthia J Cantua is 64 y.o.,  female, presents for ENDOSCOPIC PLANTAR FASCIOTOMY   Primary dx: Plantar fasciitis [M72.2].    Office note per Dr Parker on 11/19/2024  Subjective: Cynthia J Cantua 64 y.o. female that presents for follow up evaluation of plantar fasciitis right foot.  Chief Complaint  Patient presents with   Foot Pain      PT follow up, foot feels like it is getting worse    Patient's treatment thus far has included steroid injection x 3, icing and stretching, physical therapy.  Pain is rated 7 out of 10 and is described as intermittent. Patient has been following my prescribed course of therapy as instructed.  Patient states that she is concerned as her pain is not improving.    UPDATE 12/4/2024    Cynthia J Cantua is 64 y.o.,  female, here for ENDOSCOPIC PLANTAR FASCIOTOMY   Primary dx: Plantar fasciitis [M72.2].  Pt C/O of constant right heal and lateral heal pain for 6 months.  Pain rating 3-7/10 increasing to 10/10 with ambulation.  Pt has tried oral pain medication, ice, physical therapy, injections, rest with no improvement.      No recent falls or trauma. No redness, swelling or rashes.  Denies recent fever/chills.  Denies recent chest pain or SOB.     Review of additional significant medical hx:  HTN, CKD  (See chart for additional detail, including current medications /see ROS for current S/S):     NPO status: NPO  Medications taken TODAY (with sip of water): Norvasc, Wellbutrin, Prilosec, hydralazine.  Anticoagulation status: ASA Last dose 12 days ago    Denies personal hx of blood clots.  Denies personal hx of MRSA infection.  Denies any personal or family hx of previous complications w/anesthesia.     PAST MEDICAL HISTORY     Past Medical History:   Diagnosis Date    Anxiety

## 2024-12-04 NOTE — ANESTHESIA POSTPROCEDURE EVALUATION
Department of Anesthesiology  Postprocedure Note    Patient: Cynthia J Cantua  MRN: [id number]  YOB: 1960  Date of evaluation: 12/4/2024    Procedure Summary       Date: 12/04/24 Room / Location: [address]    Anesthesia Start: 1415 Anesthesia Stop: 1455    Procedure: ENDOSCOPIC PLANTAR FASCIOTOMY (Right: Foot) Diagnosis:       Plantar fasciitis      (Plantar fasciitis [M72.2])    Surgeons: Refugio Parker DPM Responsible Provider: Miller Correia MD    Anesthesia Type: General ASA Status: 2            Anesthesia Type: General    Zoë Phase I: Zoë Score: 10    Zoë Phase II: Zoë Score: 10    Anesthesia Post Evaluation    Comments: POST- ANESTHESIA EVALUATION       Pt Name: Cynthia J Cantua  MRN: [id number]  YOB: 1960  Date of evaluation: 12/4/2024  Time:  4:46 PM      /80   Pulse 74   Temp 96.9 °F (36.1 °C) (Infrared)   Resp 16   Ht 1.626 m (5' 4\")   Wt 82.6 kg (182 lb)   SpO2 98%   BMI 31.24 kg/m²      Consciousness Level  Awake  Cardiopulmonary Status  Stable  Pain Adequately Treated YES  Nausea / Vomiting  NO  Adequate Hydration  YES  Anesthesia Related Complications NONE      Electronically signed by Jeferson Conde MD on 12/4/2024 at 4:46 PM           No notable events documented.

## 2024-12-05 LAB
EKG ATRIAL RATE: 73 BPM
EKG P AXIS: 33 DEGREES
EKG P-R INTERVAL: 176 MS
EKG Q-T INTERVAL: 374 MS
EKG QRS DURATION: 88 MS
EKG QTC CALCULATION (BAZETT): 412 MS
EKG R AXIS: -5 DEGREES
EKG T AXIS: 37 DEGREES
EKG VENTRICULAR RATE: 73 BPM

## 2024-12-05 PROCEDURE — 93010 ELECTROCARDIOGRAM REPORT: CPT | Performed by: INTERNAL MEDICINE

## 2024-12-05 NOTE — OP NOTE
removed.  Cotton tip applicators were drawn through the cannula to remove any fatty tissue that would obscure visualization.  The camera was placed in the medial port and appropriate visualization of the plantar fascia was observed.  A triangular blade was placed in the lateral port and drawn across the heel from lateral to medial.  The foot was dorsiflexed to provide tension to the plantar fascia.  The triangular blade was then used to transect a third of the plantar fascia from medial to lateral.  The blade was removed and the camera was removed.  The wound was irrigated with copious amounts of sterile saline solution.  The skin to the medial lateral aspects of the right heel were each reapproximated with 3.0 Prolene.  The wounds were dressed with Adaptic, fluff, Kerlix.  The right lower extremity was placed in a posterior splint under compression.  The patient will remain nonweightbearing and will follow-up in my office for all postoperative management.    Electronically signed by Refugio Parker DPM on 12/5/2024 at 7:15 AM

## 2024-12-06 DIAGNOSIS — Z98.890 POST-OPERATIVE STATE: ICD-10-CM

## 2024-12-06 DIAGNOSIS — R11.2 POST-OPERATIVE NAUSEA AND VOMITING: Primary | ICD-10-CM

## 2024-12-06 DIAGNOSIS — Z98.890 POST-OPERATIVE NAUSEA AND VOMITING: Primary | ICD-10-CM

## 2024-12-06 RX ORDER — ONDANSETRON 4 MG/1
4 TABLET, ORALLY DISINTEGRATING ORAL 3 TIMES DAILY PRN
Qty: 21 TABLET | Refills: 0 | Status: SHIPPED | OUTPATIENT
Start: 2024-12-06

## 2024-12-09 ENCOUNTER — OFFICE VISIT (OUTPATIENT)
Dept: PODIATRY | Age: 64
End: 2024-12-09
Payer: MEDICARE

## 2024-12-09 VITALS — BODY MASS INDEX: 31.07 KG/M2 | WEIGHT: 182 LBS | HEIGHT: 64 IN

## 2024-12-09 DIAGNOSIS — R60.0 EDEMA, LOWER EXTREMITY: ICD-10-CM

## 2024-12-09 DIAGNOSIS — M72.2 PLANTAR FASCIITIS OF RIGHT FOOT: Primary | ICD-10-CM

## 2024-12-09 DIAGNOSIS — M79.671 PAIN IN RIGHT FOOT: ICD-10-CM

## 2024-12-09 DIAGNOSIS — Z98.890 POST-OPERATIVE STATE: ICD-10-CM

## 2024-12-09 PROBLEM — F33.1 MODERATE EPISODE OF RECURRENT MAJOR DEPRESSIVE DISORDER (HCC): Status: RESOLVED | Noted: 2018-09-14 | Resolved: 2024-12-09

## 2024-12-09 PROCEDURE — 99024 POSTOP FOLLOW-UP VISIT: CPT | Performed by: PODIATRIST

## 2024-12-09 PROCEDURE — L4360 PNEUMAT WALKING BOOT PRE CST: HCPCS | Performed by: PODIATRIST

## 2024-12-09 NOTE — PROGRESS NOTES
Subjective: Patient presents to the office today status-post endoscopic plantar fasciotomy right foot.  Patient states they have kept the dressing to the right lower extremity clean, dry, and intact since the surgery. Patient states that they have remained nonweightbearing to the right lower extremity as instructed since surgery.  Patient states that their pain has been well-controlled with the prescribed pain medication. Patient states that they have been taking their oral antibiotics as prescribed. Patient denies any fever, chills, nausea, vomiting, or night sweats.   Review of Systems   Constitutional:  Negative for activity change, appetite change, chills, diaphoresis, fatigue and fever.   Respiratory:  Negative for shortness of breath.    Cardiovascular:  Negative for leg swelling.   Gastrointestinal:  Negative for diarrhea and nausea.   Endocrine: Negative for cold intolerance, heat intolerance and polyuria.   Musculoskeletal:  Positive for arthralgias and gait problem. Negative for back pain, joint swelling and myalgias.   Skin:  Positive for wound. Negative for color change, pallor and rash.   Allergic/Immunologic: Negative for environmental allergies and food allergies.   Neurological:  Negative for dizziness, weakness, light-headedness and numbness.   Hematological:  Does not bruise/bleed easily.   Psychiatric/Behavioral:  Negative for behavioral problems, confusion and self-injury. The patient is not nervous/anxious.      Objective: Clinical evaluation of the patient reveals dressing to the right lower extremity to be clean, dry, and intact. Removal of the dressing reveals incision to be well-coapted and the sutures intact. There is erythema surrounding the incision site, but it does not extend beyond this area. There is no calor, drainage, or malodor noted to the surgical site. There is moderate non-pitting edema present to the right lower extremity.   Assessment:    Diagnosis Orders   1. Plantar

## 2024-12-10 ASSESSMENT — ENCOUNTER SYMPTOMS
BACK PAIN: 0
NAUSEA: 0
COLOR CHANGE: 0
SHORTNESS OF BREATH: 0
DIARRHEA: 0

## 2024-12-19 ENCOUNTER — OFFICE VISIT (OUTPATIENT)
Dept: PODIATRY | Age: 64
End: 2024-12-19

## 2024-12-19 VITALS — WEIGHT: 185 LBS | HEIGHT: 64 IN | BODY MASS INDEX: 31.58 KG/M2

## 2024-12-19 DIAGNOSIS — R60.0 EDEMA, LOWER EXTREMITY: ICD-10-CM

## 2024-12-19 DIAGNOSIS — M72.2 PLANTAR FASCIITIS OF RIGHT FOOT: Primary | ICD-10-CM

## 2024-12-19 DIAGNOSIS — M79.671 PAIN IN RIGHT FOOT: ICD-10-CM

## 2024-12-19 DIAGNOSIS — Z98.890 POST-OPERATIVE STATE: ICD-10-CM

## 2024-12-19 PROCEDURE — 99024 POSTOP FOLLOW-UP VISIT: CPT | Performed by: PODIATRIST

## 2024-12-19 ASSESSMENT — ENCOUNTER SYMPTOMS
NAUSEA: 0
COLOR CHANGE: 0
DIARRHEA: 0
SHORTNESS OF BREATH: 0
BACK PAIN: 0

## 2024-12-19 NOTE — PROGRESS NOTES
Subjective: Patient presents to the office today for their second post-operative evaluation of plantar fasciotomy right foot. Patient states that they have kept the dressing to the right lower extremity clean, dry, and intact since last visit.  Patient states that she has worn her cam walker on her right lower extremity when weightbearing as instructed since last visit. Patient states that their pain continues to be well controlled with the prescribed pain medication.   Review of Systems   Constitutional:  Negative for activity change, appetite change, chills, diaphoresis, fatigue and fever.   Respiratory:  Negative for shortness of breath.    Cardiovascular:  Negative for leg swelling.   Gastrointestinal:  Negative for diarrhea and nausea.   Endocrine: Negative for cold intolerance, heat intolerance and polyuria.   Musculoskeletal:  Positive for arthralgias and gait problem. Negative for back pain, joint swelling and myalgias.   Skin:  Positive for wound. Negative for color change, pallor and rash.   Allergic/Immunologic: Negative for environmental allergies and food allergies.   Neurological:  Negative for dizziness, weakness, light-headedness and numbness.   Hematological:  Does not bruise/bleed easily.   Psychiatric/Behavioral:  Negative for behavioral problems, confusion and self-injury. The patient is not nervous/anxious.      Objective: Clinical evaluation of the patient reveals dressing to the right lower extremity to be clean, dry, and intact. Removal of the dressing reveals incision to be well co-apted and the sutures intact. Tension placed along the incision line does not produce any gapping. There is mild erythema remaining around the incision site, but it does not extend beyond this area. There is no calor, drainage, or malodor noted to the surgical site. There remains mild nonpitting edema to the right lower extremity. The sutures were removed and tension was once again placed along the incision line.

## 2024-12-30 ENCOUNTER — HOSPITAL ENCOUNTER (OUTPATIENT)
Dept: PAIN MANAGEMENT | Age: 64
Discharge: HOME | End: 2024-12-30
Payer: MEDICARE

## 2024-12-30 VITALS — BODY MASS INDEX: 31.58 KG/M2 | HEIGHT: 64 IN | WEIGHT: 185 LBS

## 2024-12-30 DIAGNOSIS — M51.369 DEGENERATION OF INTERVERTEBRAL DISC OF LUMBAR REGION, UNSPECIFIED WHETHER PAIN PRESENT: ICD-10-CM

## 2024-12-30 DIAGNOSIS — M47.817 LUMBOSACRAL SPONDYLOSIS WITHOUT MYELOPATHY: ICD-10-CM

## 2024-12-30 DIAGNOSIS — M79.18 CHRONIC GLUTEAL PAIN: Primary | ICD-10-CM

## 2024-12-30 DIAGNOSIS — G58.8 CLUNEAL NEUROPATHY: ICD-10-CM

## 2024-12-30 DIAGNOSIS — G89.29 CHRONIC GLUTEAL PAIN: Primary | ICD-10-CM

## 2024-12-30 DIAGNOSIS — M53.3 SACROILIAC JOINT PAIN: ICD-10-CM

## 2024-12-30 PROCEDURE — 99213 OFFICE O/P EST LOW 20 MIN: CPT

## 2024-12-30 PROCEDURE — 99214 OFFICE O/P EST MOD 30 MIN: CPT | Performed by: STUDENT IN AN ORGANIZED HEALTH CARE EDUCATION/TRAINING PROGRAM

## 2024-12-30 NOTE — PROGRESS NOTES
Father     High Cholesterol Father     Alcohol Abuse Father     Heart Attack Father          heart attack at 90 yrs old    Substance Abuse Father         Alcohol    Hypertension Father     Cancer Brother         prostate    Alcohol Abuse Brother     Arthritis Brother     High Blood Pressure Brother     High Cholesterol Brother     Substance Abuse Brother         Alcohol       Social History     Socioeconomic History    Marital status:      Spouse name: Not on file    Number of children: Not on file    Years of education: Not on file    Highest education level: Not on file   Occupational History    Not on file   Tobacco Use    Smoking status: Former     Current packs/day: 0.00     Average packs/day: 1.1 packs/day for 40.0 years (45.0 ttl pk-yrs)     Types: Cigarettes     Start date: 1992     Quit date: 2022     Years since quittin.4    Smokeless tobacco: Never    Tobacco comments:     I quit smoking 2022   Vaping Use    Vaping status: Never Used   Substance and Sexual Activity    Alcohol use: Not Currently     Comment: I quit drinking alcohol 5+yrs ago when I quit opiods    Drug use: Yes     Frequency: 4.0 times per week     Types: Marijuana (Weed)     Comment: Gummies    Sexual activity: Not Currently     Partners: Male   Other Topics Concern    Not on file   Social History Narrative    Not on file     Social Determinants of Health     Financial Resource Strain: Low Risk  (2024)    Overall Financial Resource Strain (CARDIA)     Difficulty of Paying Living Expenses: Not hard at all   Food Insecurity: No Food Insecurity (2024)    Hunger Vital Sign     Worried About Running Out of Food in the Last Year: Never true     Ran Out of Food in the Last Year: Never true   Transportation Needs: Unknown (2024)    PRAPARE - Transportation     Lack of Transportation (Medical): Not on file     Lack of Transportation (Non-Medical): No   Physical Activity: Inactive (2024)

## 2025-01-09 ENCOUNTER — TELEPHONE (OUTPATIENT)
Dept: PAIN MANAGEMENT | Age: 65
End: 2025-01-09

## 2025-01-09 NOTE — TELEPHONE ENCOUNTER
Patient called and r/s'd procedures 1/14/25 and 1/28/25 to 2/11/25 and 2/25/25 due to other family member having health issures

## 2025-01-24 PROBLEM — I12.9 BENIGN HYPERTENSION WITH CKD (CHRONIC KIDNEY DISEASE) STAGE III (HCC): Status: ACTIVE | Noted: 2024-09-03

## 2025-01-24 PROBLEM — N18.30 BENIGN HYPERTENSION WITH CKD (CHRONIC KIDNEY DISEASE) STAGE III (HCC): Status: ACTIVE | Noted: 2024-09-03

## 2025-02-11 ENCOUNTER — HOSPITAL ENCOUNTER (OUTPATIENT)
Dept: PAIN MANAGEMENT | Facility: CLINIC | Age: 65
Discharge: HOME | End: 2025-02-11
Payer: MEDICARE

## 2025-02-11 VITALS
OXYGEN SATURATION: 98 % | DIASTOLIC BLOOD PRESSURE: 33 MMHG | SYSTOLIC BLOOD PRESSURE: 148 MMHG | BODY MASS INDEX: 30.39 KG/M2 | WEIGHT: 178 LBS | TEMPERATURE: 97.7 F | HEIGHT: 64 IN | HEART RATE: 71 BPM | RESPIRATION RATE: 18 BRPM

## 2025-02-11 DIAGNOSIS — R52 PAIN MANAGEMENT: ICD-10-CM

## 2025-02-11 PROCEDURE — 99152 MOD SED SAME PHYS/QHP 5/>YRS: CPT | Performed by: STUDENT IN AN ORGANIZED HEALTH CARE EDUCATION/TRAINING PROGRAM

## 2025-02-11 PROCEDURE — 77002 NEEDLE LOCALIZATION BY XRAY: CPT | Performed by: STUDENT IN AN ORGANIZED HEALTH CARE EDUCATION/TRAINING PROGRAM

## 2025-02-11 PROCEDURE — 6360000002 HC RX W HCPCS: Performed by: STUDENT IN AN ORGANIZED HEALTH CARE EDUCATION/TRAINING PROGRAM

## 2025-02-11 PROCEDURE — 77002 NEEDLE LOCALIZATION BY XRAY: CPT

## 2025-02-11 PROCEDURE — 64450 NJX AA&/STRD OTHER PN/BRANCH: CPT

## 2025-02-11 PROCEDURE — 64450 NJX AA&/STRD OTHER PN/BRANCH: CPT | Performed by: STUDENT IN AN ORGANIZED HEALTH CARE EDUCATION/TRAINING PROGRAM

## 2025-02-11 RX ORDER — LIDOCAINE HYDROCHLORIDE 20 MG/ML
INJECTION, SOLUTION EPIDURAL; INFILTRATION; INTRACAUDAL; PERINEURAL
Status: COMPLETED | OUTPATIENT
Start: 2025-02-11 | End: 2025-02-11

## 2025-02-11 RX ORDER — TRIAMCINOLONE ACETONIDE 40 MG/ML
INJECTION, SUSPENSION INTRA-ARTICULAR; INTRAMUSCULAR
Status: COMPLETED | OUTPATIENT
Start: 2025-02-11 | End: 2025-02-11

## 2025-02-11 RX ORDER — MIDAZOLAM HYDROCHLORIDE 2 MG/2ML
INJECTION, SOLUTION INTRAMUSCULAR; INTRAVENOUS
Status: COMPLETED | OUTPATIENT
Start: 2025-02-11 | End: 2025-02-11

## 2025-02-11 RX ADMIN — MIDAZOLAM HYDROCHLORIDE 2 MG: 1 INJECTION, SOLUTION INTRAMUSCULAR; INTRAVENOUS at 09:17

## 2025-02-11 RX ADMIN — LIDOCAINE HYDROCHLORIDE 5 ML: 20 INJECTION, SOLUTION EPIDURAL; INFILTRATION; INTRACAUDAL; PERINEURAL at 09:17

## 2025-02-11 RX ADMIN — TRIAMCINOLONE ACETONIDE 40 MG: 40 INJECTION, SUSPENSION INTRA-ARTICULAR; INTRAMUSCULAR at 09:18

## 2025-02-11 ASSESSMENT — PAIN - FUNCTIONAL ASSESSMENT
PAIN_FUNCTIONAL_ASSESSMENT: 0-10
PAIN_FUNCTIONAL_ASSESSMENT: PREVENTS OR INTERFERES SOME ACTIVE ACTIVITIES AND ADLS
PAIN_FUNCTIONAL_ASSESSMENT: 0-10

## 2025-02-11 ASSESSMENT — PAIN DESCRIPTION - DESCRIPTORS: DESCRIPTORS: ACHING

## 2025-02-11 NOTE — H&P
Pain Pre-Op H&P Note    SUBJECTIVE:  No chief complaint on file.      History of Present Illness:   Cynthia J Cantua is a 65 y.o. female who presents with gluteal pain.    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 I do use cannabis gummies for anxiety and pain       Past Surgical History:   Procedure Laterality Date    APPENDECTOMY      COLONOSCOPY N/A 2024    COLONOSCOPY POLYPECTOMY HOT SNARE BIOPSY WITH EPI INJECTION AND CLIP APPLICATION X 4 performed by Yinka Crouch MD at [facility] ENDO    FINGER TRIGGER RELEASE Right     thumb    HYSTERECTOMY, TOTAL ABDOMINAL (CERVIX REMOVED)      PLANTAR FASCIA SURGERY Right 2024    ENDOSCOPIC PLANTAR FASCIOTOMY performed by Refugio Parker DPM at [facility] OR    SACRAL NERVE STIMULATOR PLACEMENT      peripheral nerve stimulator for pain management-now removed    UPPER GASTROINTESTINAL ENDOSCOPY N/A 2022    EGD BIOPSY performed by Eulalia Fajardo MD at [facility] OR    WISDOM TOOTH EXTRACTION         Family History   Problem Relation Age of Onset    Cancer Mother     Breast Cancer Mother 55    Diabetes Father     High Blood Pressure Father     High Cholesterol Father     Alcohol Abuse Father     Heart Attack Father          heart attack at 90 yrs old    Substance Abuse Father         Alcohol    Hypertension Father     Cancer Brother         prostate    Alcohol Abuse Brother     Arthritis Brother     High Blood Pressure Brother     High Cholesterol Brother     Substance Abuse Brother         Alcohol       Social

## 2025-02-11 NOTE — OP NOTE
well.     SEDATION NOTE:     ASA CLASSIFICATION  2  MP   CLASSIFICATION  2     Moderate intravenous conscious sedation was supervised by Dr. Carreno  The patient was independently monitored by a Registered Nurse assigned to the Procedure Room  Monitoring included automated blood pressure, continuous EKG, Capnography and continuous pulse oximetry.   The detailed Conscious Record is permanently stored in the Hospital Information System.      The following is the conscious sedation record:  Start Time:  913  End Time:  923  Duration:  10 minutes  MEDS GIVEN Versed 2 mg

## 2025-02-11 NOTE — DISCHARGE INSTRUCTIONS
You have received a sedative/anesthetic therefore you should not consume any alcoholic beverages for 24 hours.  Do not drive or operate machinery for 24 hours.    Do not take a tub bath for 72 hours after procedure (this includes hot tubs).  You may shower, but avoid hot water to injection site.   Avoid strenuous activity TODAY especially if you experience dizziness.   Remove band-aid the next day.    Wash off any residual iodine 24 hours from today.   Do not use heat, heating pad, or any other heating device over the injection site for 3 days after the procedure.    If you experience pain after your procedure, you may continue with your current pain medication as prescribed.  (DO NOT INCREASE YOUR PAIN MEDICATION WITHOUT TALKING TO DOCTOR)  Soreness and pain at injection site is common, may use ice to reduce soreness.    Please complete pain diary as instructed. Office staff will contact you to review results.    Call [facility] at [phone] if you experience:   Fever, chills or temperature over 100    Vomiting, headache, persistent stiff neck, nausea or blurred vision   Difficulty urinating or unable to urinate within 8 hours   Increase in weakness, numbness or loss of function of limbs  Increased redness, swelling or drainage at the injection site

## 2025-02-25 ENCOUNTER — HOSPITAL ENCOUNTER (OUTPATIENT)
Dept: PAIN MANAGEMENT | Facility: CLINIC | Age: 65
Discharge: HOME | End: 2025-02-25
Payer: MEDICARE

## 2025-02-25 VITALS
WEIGHT: 178 LBS | OXYGEN SATURATION: 98 % | DIASTOLIC BLOOD PRESSURE: 58 MMHG | HEART RATE: 88 BPM | RESPIRATION RATE: 12 BRPM | SYSTOLIC BLOOD PRESSURE: 124 MMHG | BODY MASS INDEX: 30.39 KG/M2 | TEMPERATURE: 98 F | HEIGHT: 64 IN

## 2025-02-25 DIAGNOSIS — R52 PAIN MANAGEMENT: ICD-10-CM

## 2025-02-25 PROCEDURE — 64450 NJX AA&/STRD OTHER PN/BRANCH: CPT | Performed by: STUDENT IN AN ORGANIZED HEALTH CARE EDUCATION/TRAINING PROGRAM

## 2025-02-25 PROCEDURE — 77002 NEEDLE LOCALIZATION BY XRAY: CPT | Performed by: STUDENT IN AN ORGANIZED HEALTH CARE EDUCATION/TRAINING PROGRAM

## 2025-02-25 PROCEDURE — 99152 MOD SED SAME PHYS/QHP 5/>YRS: CPT | Performed by: STUDENT IN AN ORGANIZED HEALTH CARE EDUCATION/TRAINING PROGRAM

## 2025-02-25 PROCEDURE — 6360000002 HC RX W HCPCS: Performed by: STUDENT IN AN ORGANIZED HEALTH CARE EDUCATION/TRAINING PROGRAM

## 2025-02-25 PROCEDURE — 77002 NEEDLE LOCALIZATION BY XRAY: CPT

## 2025-02-25 PROCEDURE — 64450 NJX AA&/STRD OTHER PN/BRANCH: CPT

## 2025-02-25 RX ORDER — LIDOCAINE HYDROCHLORIDE 20 MG/ML
INJECTION, SOLUTION EPIDURAL; INFILTRATION; INTRACAUDAL; PERINEURAL
Status: COMPLETED | OUTPATIENT
Start: 2025-02-25 | End: 2025-02-25

## 2025-02-25 RX ORDER — MIDAZOLAM HYDROCHLORIDE 2 MG/2ML
INJECTION, SOLUTION INTRAMUSCULAR; INTRAVENOUS
Status: COMPLETED | OUTPATIENT
Start: 2025-02-25 | End: 2025-02-25

## 2025-02-25 RX ORDER — TRIAMCINOLONE ACETONIDE 40 MG/ML
INJECTION, SUSPENSION INTRA-ARTICULAR; INTRAMUSCULAR
Status: COMPLETED | OUTPATIENT
Start: 2025-02-25 | End: 2025-02-25

## 2025-02-25 RX ADMIN — LIDOCAINE HYDROCHLORIDE 5 ML: 20 INJECTION, SOLUTION EPIDURAL; INFILTRATION; INTRACAUDAL; PERINEURAL at 08:09

## 2025-02-25 RX ADMIN — MIDAZOLAM HYDROCHLORIDE 2 MG: 1 INJECTION, SOLUTION INTRAMUSCULAR; INTRAVENOUS at 08:07

## 2025-02-25 RX ADMIN — TRIAMCINOLONE ACETONIDE 40 MG: 40 INJECTION, SUSPENSION INTRA-ARTICULAR; INTRAMUSCULAR at 08:09

## 2025-02-25 ASSESSMENT — PAIN - FUNCTIONAL ASSESSMENT: PAIN_FUNCTIONAL_ASSESSMENT: 0-10

## 2025-02-25 ASSESSMENT — PAIN SCALES - GENERAL: PAINLEVEL_OUTOF10: 5

## 2025-02-25 ASSESSMENT — PAIN DESCRIPTION - DESCRIPTORS: DESCRIPTORS: ACHING

## 2025-02-25 NOTE — DISCHARGE INSTRUCTIONS
You have received a sedative/anesthetic therefore you should not consume any alcoholic beverages for 24 hours.  Do not drive or operate machinery for 24 hours.    Do not take a tub bath for 72 hours after procedure (this includes hot tubs).  You may shower, but avoid hot water to injection site.   Avoid strenuous activity TODAY especially if you experience dizziness.   Remove band-aid the next day.    Wash off any residual iodine 24 hours from today.   Do not use heat, heating pad, or any other heating device over the injection site for 3 days after the procedure.    If you experience pain after your procedure, you may continue with your current pain medication as prescribed.  (DO NOT INCREASE YOUR PAIN MEDICATION WITHOUT TALKING TO DOCTOR)  Soreness and pain at injection site is common, may use ice to reduce soreness.    What To Expect After A Steroid Injection  You should start to feel the effects of the steroid injection in about 48-72 hours  You may experience facial flushing, night sweats and irritability.  Other common steroid related side effects are increased appetite, mood elevation, insomnia and fluid retention.  These effects usually subside in a few days.  Steroids used in epidural injections may cause muscle spasms for a few days.  If you are diabetic, your blood sugar may be elevated after your procedure due to the steroids.  You will need to monitor your blood sugar more closely while going through a series of injections (Check blood sugar at meals and bedtime for 5 days).  You may require adjustment in your diabetic medications, contact your PCP office to discuss.      Please complete pain diary as instructed. Office staff will contact you to review results.    Call [facility] at [phone] if you experience:   Fever, chills or temperature over 100    Vomiting, headache, persistent stiff neck, nausea or blurred vision   Difficulty urinating or unable to urinate within 8 hours   Increase in

## 2025-02-25 NOTE — OP NOTE
PROCEDURE PERFORMED: Right Medial, Superior and Lateral Cluneal Nerve Block using Fluoroscopy     PREOPERATIVE DIAGNOSIS: Chronic gluteal pain, cluneal neuropathy     INDICATIONS: Chronic gluteal pain     The patient's history and physical exam were reviewed.  The risk, benefits, and alternatives of the procedure were discussed and all questions were answered to the patient's satisfaction.  The patient agreed to proceed and written informed consent was obtained.     POSTOPERATIVE DIAGNOSIS: Chronic gluteal pain, cluneal neuropathy     PHYSICIAN:  Dr. Apolinar Carreno DO     ANESTHESIA:  LOCAL     ASSISTANT:  NONE     PATHOLOGY:  NONE     ESTIMATED BLOOD LOSS:  N/A     IMPLANTS:  NONE     PROCEDURE DESCRIPTION: Right Medial, Superior and Lateral Cluneal Nerve Block using Fluoroscopy     The patient was placed on the operative bed in prone position.  The area was prepped with  Chlorhexidine.  The area was then draped in a sterile fashion.     Targeted levels: Right Medial, Superior and Lateral Cluneal Nerves     An AP  fluoroscopy image was used to identify the iliac crest and nerve targets. At each cluneal nerve, a 25 gauge 3.5 inch Quincke spinal needle was advanced under AP fluoroscopic projections until bone was contacted along the medial, superior and lateral aspect of the iliac crest targeting the medial, superior and lateral cluneal nerves respectively. Aspiration of each needle was negative for blood, CSF and paresthesia prior to injection.     Then, after negative aspiration, 2 mL lidocaine 2% was injected at each level.  The needles were then removed. The needle sites were dressed appropriately.  The patient did not experience any hemodynamic or neurologic sequelae.       The patient was transferred to the postoperative care unit in stable condition.  Written discharge instructions were given to the patient.     COMPLICATIONS:  There were no apparent complications.  The patient tolerated the procedure

## 2025-02-25 NOTE — H&P
History     Socioeconomic History    Marital status:      Spouse name: Not on file    Number of children: Not on file    Years of education: Not on file    Highest education level: Not on file   Occupational History    Not on file   Tobacco Use    Smoking status: Former     Current packs/day: 0.00     Average packs/day: 1.1 packs/day for 40.0 years (45.0 ttl pk-yrs)     Types: Cigarettes     Start date: 1992     Quit date: 2022     Years since quittin.5    Smokeless tobacco: Never    Tobacco comments:     I quit smoking 2022   Vaping Use    Vaping status: Never Used   Substance and Sexual Activity    Alcohol use: Not Currently     Comment: I quit drinking alcohol 5+yrs ago when I quit opiods    Drug use: Yes     Frequency: 4.0 times per week     Types: Marijuana (Weed)     Comment: Gummies    Sexual activity: Not Currently     Partners: Male   Other Topics Concern    Not on file   Social History Narrative    Not on file     Social Determinants of Health     Financial Resource Strain: Low Risk  (2024)    Overall Financial Resource Strain (CARDIA)     Difficulty of Paying Living Expenses: Not hard at all   Food Insecurity: No Food Insecurity (2025)    Hunger Vital Sign     Worried About Running Out of Food in the Last Year: Never true     Ran Out of Food in the Last Year: Never true   Transportation Needs: No Transportation Needs (2025)    PRAPARE - Transportation     Lack of Transportation (Medical): No     Lack of Transportation (Non-Medical): No   Physical Activity: Inactive (2025)    Exercise Vital Sign     Days of Exercise per Week: 0 days     Minutes of Exercise per Session: 0 min   Stress: Not on file   Social Connections: Not on file   Intimate Partner Violence: Unknown (2024)    [facility]    [place] [facility] [place]     Fear of Current or Ex-Partner: Not on file     Emotionally Abused: Not on file

## 2025-03-10 ENCOUNTER — HOSPITAL ENCOUNTER (OUTPATIENT)
Dept: PAIN MANAGEMENT | Age: 65
Discharge: HOME | End: 2025-03-10
Payer: MEDICARE

## 2025-03-10 VITALS — WEIGHT: 178 LBS | HEIGHT: 64 IN | BODY MASS INDEX: 30.39 KG/M2

## 2025-03-10 DIAGNOSIS — M47.817 LUMBOSACRAL SPONDYLOSIS WITHOUT MYELOPATHY: ICD-10-CM

## 2025-03-10 DIAGNOSIS — M53.3 SACROILIAC JOINT PAIN: ICD-10-CM

## 2025-03-10 DIAGNOSIS — M51.369 DEGENERATION OF INTERVERTEBRAL DISC OF LUMBAR REGION, UNSPECIFIED WHETHER PAIN PRESENT: ICD-10-CM

## 2025-03-10 DIAGNOSIS — G89.29 CHRONIC GLUTEAL PAIN: Primary | ICD-10-CM

## 2025-03-10 DIAGNOSIS — M79.18 CHRONIC GLUTEAL PAIN: Primary | ICD-10-CM

## 2025-03-10 DIAGNOSIS — G58.8 CLUNEAL NEUROPATHY: ICD-10-CM

## 2025-03-10 PROCEDURE — 99213 OFFICE O/P EST LOW 20 MIN: CPT

## 2025-03-10 ASSESSMENT — PAIN SCALES - GENERAL: PAINLEVEL_OUTOF10: 5

## 2025-03-10 NOTE — PROGRESS NOTES
Chronic Pain Clinic Note     Encounter Date: 3/10/2025     SUBJECTIVE:  Chief Complaint   Patient presents with    Back Pain       History of Present Illness:   Cynthia J Cantua is a 65 y.o. female who presents with back pain    Medication Refill: n/a    Current Complaints of Pain:   Location: back   Radiation: None  Severity: Moderate  Pain Numerical Score - 5 today   Average: 5     Highest: 8  Lowest: 3  Character/Quality: Complains of pain that is aching, burning, sharp  Timing: Constant  Associated symptoms: none  Numbness: no  Weakness: no  Exacerbating factors: standing sitting  Alleviating factors: laying down  Length of time pain has been present: Started about 6 months ago  Inciting event/injury: no  Bowel/Bladder incontinence: no  Falls: none in the past month   Physical Therapy: about 2/3 weeks ago at [place] [facility]    History of Interventions:   Surgery: No previous lumbar/cervical surgeries  Injections: nerve block 02/2025 - pt states 100% pain relief during the first 2-4 hours after the procedures     Imaging:    MRI LUMBAR 3/27/24    Past Medical History:   Diagnosis Date    Anxiety 09/14/2018    Breast disorder     Chronic back pain     Back probs for years    Chronic kidney disease     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 I do use cannabis gummies for anxiety and pain       Past Surgical History:   Procedure Laterality Date    APPENDECTOMY      COLONOSCOPY N/A 09/25/2024    COLONOSCOPY POLYPECTOMY HOT SNARE BIOPSY WITH EPI INJECTION AND CLIP APPLICATION X 4 performed by Yinka Crouch MD at [facility] ENDO    FINGER TRIGGER RELEASE Right     thumb    HYSTERECTOMY, TOTAL ABDOMINAL (CERVIX REMOVED)

## 2025-03-10 NOTE — H&P (VIEW-ONLY)
Chronic Pain Clinic Note     Encounter Date: 3/10/2025     SUBJECTIVE:  Chief Complaint   Patient presents with    Back Pain       History of Present Illness:   Cynthia J Cantua is a 65 y.o. female who presents with back pain    Medication Refill: n/a    Current Complaints of Pain:   Location: back   Radiation: None  Severity: Moderate  Pain Numerical Score - 5 today   Average: 5     Highest: 8  Lowest: 3  Character/Quality: Complains of pain that is aching, burning, sharp  Timing: Constant  Associated symptoms: none  Numbness: no  Weakness: no  Exacerbating factors: standing sitting  Alleviating factors: laying down  Length of time pain has been present: Started about 6 months ago  Inciting event/injury: no  Bowel/Bladder incontinence: no  Falls: none in the past month   Physical Therapy: about 2/3 weeks ago at [place] physical [facility]    History of Interventions:   Surgery: No previous lumbar/cervical surgeries  Injections: nerve block 02/2025 - pt states 100% pain relief during the first 2-4 hours after the procedures     Imaging:    MRI LUMBAR 3/27/24    Past Medical History:   Diagnosis Date    Anxiety 09/14/2018    Breast disorder     Chronic back pain     Back probs for years    Chronic kidney disease     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 I do use cannabis gummies for anxiety and pain       Past Surgical History:   Procedure Laterality Date    APPENDECTOMY      COLONOSCOPY N/A 09/25/2024    COLONOSCOPY POLYPECTOMY HOT SNARE BIOPSY WITH EPI INJECTION AND CLIP APPLICATION X 4 performed by Yinka Crouch MD at [facility] ENDO    FINGER TRIGGER RELEASE Right     thumb    HYSTERECTOMY, TOTAL ABDOMINAL (CERVIX REMOVED)

## 2025-03-24 ENCOUNTER — HOSPITAL ENCOUNTER (OUTPATIENT)
Age: 65
Setting detail: SPECIMEN
Discharge: HOME | End: 2025-03-24

## 2025-03-24 DIAGNOSIS — N18.31 STAGE 3A CHRONIC KIDNEY DISEASE (HCC): ICD-10-CM

## 2025-03-24 DIAGNOSIS — I12.9 BENIGN HYPERTENSION WITH CKD (CHRONIC KIDNEY DISEASE) STAGE III (HCC): ICD-10-CM

## 2025-03-24 DIAGNOSIS — N18.30 BENIGN HYPERTENSION WITH CKD (CHRONIC KIDNEY DISEASE) STAGE III (HCC): ICD-10-CM

## 2025-03-24 DIAGNOSIS — E55.9 VITAMIN D DEFICIENCY: ICD-10-CM

## 2025-03-24 LAB
25(OH)D3 SERPL-MCNC: 54.7 NG/ML (ref 30–100)
ANION GAP SERPL CALCULATED.3IONS-SCNC: 13 MMOL/L (ref 9–16)
BACTERIA URNS QL MICRO: ABNORMAL
BILIRUB UR QL STRIP: NEGATIVE
BUN SERPL-MCNC: 22 MG/DL (ref 8–23)
CALCIUM SERPL-MCNC: 10.1 MG/DL (ref 8.6–10.4)
CASTS #/AREA URNS LPF: ABNORMAL /LPF (ref 0–8)
CHLORIDE SERPL-SCNC: 107 MMOL/L (ref 98–107)
CLARITY UR: ABNORMAL
CO2 SERPL-SCNC: 25 MMOL/L (ref 20–31)
COLOR UR: YELLOW
CREAT SERPL-MCNC: 1.1 MG/DL (ref 0.6–0.9)
CREAT UR-MCNC: 146 MG/DL (ref 28–217)
EPI CELLS #/AREA URNS HPF: ABNORMAL /HPF (ref 0–5)
GFR, ESTIMATED: 55 ML/MIN/1.73M2
GLUCOSE UR STRIP-MCNC: NEGATIVE MG/DL
HCT VFR BLD AUTO: 40.3 % (ref 36.3–47.1)
HGB BLD-MCNC: 12.8 G/DL (ref 11.9–15.1)
HGB UR QL STRIP.AUTO: NEGATIVE
KETONES UR STRIP-MCNC: NEGATIVE MG/DL
LEUKOCYTE ESTERASE UR QL STRIP: NEGATIVE
MAGNESIUM SERPL-MCNC: 2 MG/DL (ref 1.6–2.4)
MICROALBUMIN UR-MCNC: 63 MG/L (ref 0–20)
MICROALBUMIN/CREAT UR-RTO: 43 MCG/MG CREAT (ref 0–25)
NITRITE UR QL STRIP: NEGATIVE
PH UR STRIP: 6.5 [PH] (ref 5–8)
PHOSPHATE SERPL-MCNC: 2.4 MG/DL (ref 2.5–4.5)
POTASSIUM SERPL-SCNC: 5 MMOL/L (ref 3.7–5.3)
PROT UR STRIP-MCNC: ABNORMAL MG/DL
RBC #/AREA URNS HPF: ABNORMAL /HPF (ref 0–4)
SODIUM SERPL-SCNC: 145 MMOL/L (ref 136–145)
SP GR UR STRIP: 1.02 (ref 1–1.03)
UROBILINOGEN UR STRIP-ACNC: NORMAL EU/DL (ref 0–1)
WBC #/AREA URNS HPF: ABNORMAL /HPF (ref 0–5)

## 2025-03-25 ENCOUNTER — HOSPITAL ENCOUNTER (OUTPATIENT)
Dept: PAIN MANAGEMENT | Facility: CLINIC | Age: 65
Discharge: HOME | End: 2025-03-25
Payer: MEDICARE

## 2025-03-25 VITALS
SYSTOLIC BLOOD PRESSURE: 102 MMHG | WEIGHT: 178 LBS | RESPIRATION RATE: 10 BRPM | OXYGEN SATURATION: 95 % | DIASTOLIC BLOOD PRESSURE: 57 MMHG | TEMPERATURE: 97.7 F | BODY MASS INDEX: 30.39 KG/M2 | HEIGHT: 64 IN | HEART RATE: 68 BPM

## 2025-03-25 DIAGNOSIS — R52 PAIN MANAGEMENT: ICD-10-CM

## 2025-03-25 PROCEDURE — 6360000002 HC RX W HCPCS: Performed by: STUDENT IN AN ORGANIZED HEALTH CARE EDUCATION/TRAINING PROGRAM

## 2025-03-25 PROCEDURE — 99152 MOD SED SAME PHYS/QHP 5/>YRS: CPT | Performed by: STUDENT IN AN ORGANIZED HEALTH CARE EDUCATION/TRAINING PROGRAM

## 2025-03-25 PROCEDURE — 64640 INJECTION TREATMENT OF NERVE: CPT

## 2025-03-25 PROCEDURE — 64640 INJECTION TREATMENT OF NERVE: CPT | Performed by: STUDENT IN AN ORGANIZED HEALTH CARE EDUCATION/TRAINING PROGRAM

## 2025-03-25 PROCEDURE — 77002 NEEDLE LOCALIZATION BY XRAY: CPT

## 2025-03-25 RX ORDER — TRIAMCINOLONE ACETONIDE 40 MG/ML
INJECTION, SUSPENSION INTRA-ARTICULAR; INTRAMUSCULAR
Status: COMPLETED | OUTPATIENT
Start: 2025-03-25 | End: 2025-03-25

## 2025-03-25 RX ORDER — MIDAZOLAM HYDROCHLORIDE 2 MG/2ML
INJECTION, SOLUTION INTRAMUSCULAR; INTRAVENOUS
Status: COMPLETED | OUTPATIENT
Start: 2025-03-25 | End: 2025-03-25

## 2025-03-25 RX ORDER — LIDOCAINE HYDROCHLORIDE 10 MG/ML
INJECTION, SOLUTION EPIDURAL; INFILTRATION; INTRACAUDAL; PERINEURAL
Status: COMPLETED | OUTPATIENT
Start: 2025-03-25 | End: 2025-03-25

## 2025-03-25 RX ORDER — LIDOCAINE HYDROCHLORIDE 20 MG/ML
INJECTION, SOLUTION EPIDURAL; INFILTRATION; INTRACAUDAL; PERINEURAL
Status: COMPLETED | OUTPATIENT
Start: 2025-03-25 | End: 2025-03-25

## 2025-03-25 RX ADMIN — LIDOCAINE HYDROCHLORIDE 2 ML: 10 INJECTION, SOLUTION EPIDURAL; INFILTRATION; INTRACAUDAL; PERINEURAL at 08:36

## 2025-03-25 RX ADMIN — LIDOCAINE HYDROCHLORIDE 5 ML: 20 INJECTION, SOLUTION EPIDURAL; INFILTRATION; INTRACAUDAL; PERINEURAL at 08:33

## 2025-03-25 RX ADMIN — TRIAMCINOLONE ACETONIDE 40 MG: 40 INJECTION, SUSPENSION INTRA-ARTICULAR; INTRAMUSCULAR at 08:36

## 2025-03-25 RX ADMIN — MIDAZOLAM HYDROCHLORIDE 2 MG: 1 INJECTION, SOLUTION INTRAMUSCULAR; INTRAVENOUS at 08:29

## 2025-03-25 ASSESSMENT — PAIN DESCRIPTION - DESCRIPTORS: DESCRIPTORS: ACHING

## 2025-03-25 ASSESSMENT — PAIN - FUNCTIONAL ASSESSMENT
PAIN_FUNCTIONAL_ASSESSMENT: NONE - DENIES PAIN
PAIN_FUNCTIONAL_ASSESSMENT: PREVENTS OR INTERFERES SOME ACTIVE ACTIVITIES AND ADLS
PAIN_FUNCTIONAL_ASSESSMENT: 0-10

## 2025-03-25 NOTE — DISCHARGE INSTRUCTIONS

## 2025-03-25 NOTE — OP NOTE
patient did not experience any hemodynamic or neurologic sequelae.       The patient was transferred to the postoperative care unit in stable condition.  Written discharge instructions were given to the patient.     COMPLICATIONS:  There were no apparent complications.  The patient tolerated the procedure well.       SEDATION NOTE:     ASA CLASSIFICATION  2  MP   CLASSIFICATION  2     Moderate intravenous conscious sedation was supervised by Dr. Carreno  The patient was independently monitored by a Registered Nurse assigned to the Procedure Room  Monitoring included automated blood pressure, continuous EKG, Capnography and continuous pulse oximetry.   The detailed Conscious Record is permanently stored in the Hospital Information System.      The following is the conscious sedation record:  Start Time:  828  End Time:  838  Duration:  10 minutes  MEDS GIVEN Versed 2 mg

## 2025-03-25 NOTE — INTERVAL H&P NOTE
Update History & Physical    The patient's History and Physical of March 10, 2025 was reviewed with the patient and I examined the patient. There was no change. The surgical site was confirmed by the patient and me.     Plan: The risks, benefits, expected outcome, and alternative to the recommended procedure have been discussed with the patient. Patient understands and wants to proceed with the procedure.     ASA CLASSIFICATION  2  MP   CLASSIFICATION  2    Electronically signed by Apolinar Carreno DO on 3/25/2025 at 8:12 AM

## 2025-04-01 ENCOUNTER — HOSPITAL ENCOUNTER (OUTPATIENT)
Dept: PREADMISSION TESTING | Age: 65
Discharge: HOME | End: 2025-04-05

## 2025-04-01 VITALS — BODY MASS INDEX: 30.22 KG/M2 | WEIGHT: 177 LBS | HEIGHT: 64 IN

## 2025-04-01 NOTE — PROGRESS NOTES
Pre-op Instructions For Out-Patient Endoscopy Surgery    Medication Instructions:  Please stop herbs and any supplements now (includes vitamins and minerals).    For these medications:  Dulaglutide (Trulicity), Exenatide (Byetta and Bydureon, Liraglutide (Victoza), Lixisenatide (Adlyxin), Semaglutide (Ozempic and Rybelsus), Tirzepatide (Mounjaro, Zepbound)- Stop 1 week prior if taking weekly or 1 day prior if taking every 12 hours or daily.     Please contact your surgeon and prescribing physician for pre-op instructions for any blood thinners. STOP ASPIRIN AS DIRECTED    If you have inhalers/aerosol treatments at home, please use them the morning of your surgery and bring the inhalers with you to the hospital.    Please take the following medications the morning of your surgery with a sip of water:    Amlodipine, Gabapentin, Hydralazine, and Metoprolol     Surgery Instructions:  After midnight before surgery:  Do not eat or drink anything, including water, mints, gum, and hard candy.  You may brush your teeth without swallowing.  No smoking, chewing tobacco, or street drugs.     ** Please Follow Bowel Prep instructions if given by surgeon's office**    Please shower or bathe before surgery.       Please do not wear any cologne, lotion, powder, jewelry, piercings, perfume, makeup, nail polish, hair accessories, or hair spray on the day of surgery.  Wear loose comfortable clothing.    Leave your valuables at home but bring a payment source for any after-surgery prescriptions you plan to fill at [facility].  Bring a storage case for any glasses/contacts.    An adult who is responsible for you MUST drive you home and should be with you for the first 24 hours after surgery.     The Day of Surgery:  Arrive at [facility] Surgery Entrance at the time directed by your surgeon and check in at the desk.     If you have a living will or healthcare power of , please bring a copy.    You will be

## 2025-04-02 RX ORDER — BISACODYL 5 MG
TABLET, DELAYED RELEASE (ENTERIC COATED) ORAL
Qty: 4 TABLET | Refills: 0 | Status: SHIPPED | OUTPATIENT
Start: 2025-04-02

## 2025-04-02 RX ORDER — POLYETHYLENE GLYCOL 3350 17 G/17G
POWDER, FOR SOLUTION ORAL
Qty: 238 G | Refills: 0 | Status: SHIPPED | OUTPATIENT
Start: 2025-04-02

## 2025-04-04 ENCOUNTER — HOSPITAL ENCOUNTER (OUTPATIENT)
Dept: CT IMAGING | Age: 65
Discharge: HOME | End: 2025-04-04
Payer: MEDICARE

## 2025-04-04 VITALS — BODY MASS INDEX: 29.88 KG/M2 | HEIGHT: 64 IN | WEIGHT: 175 LBS

## 2025-04-04 DIAGNOSIS — Z87.891 PERSONAL HISTORY OF TOBACCO USE: ICD-10-CM

## 2025-04-04 PROCEDURE — 71271 CT THORAX LUNG CANCER SCR C-: CPT

## 2025-04-08 NOTE — PRE-PROCEDURE INSTRUCTIONS
No answer, left message ?                             Unable to leave message ?    When were you told to arrive at hospital ?  0615    Do you have a  ?y    Are you on any blood thinners ?      n               If yes when did you stop taking ?    Do you have your prep Rx filled and instruction ?  y    Nothing to eat the day before , only clear liquids.y    Are you experiencing any covid symptoms ? n    Do you have any infections or rash we should be aware of ?      Do you have the Hibiclens soap to use the night before and the morning of surgery ?    Nothing to eat or drink after midnight, only a sip of water to take any medication instructed to take the night before.y  Wear comfortable clothing, leave any valuables at home, remove any jewelry and body piercing . y

## 2025-04-09 ENCOUNTER — ANESTHESIA EVENT (OUTPATIENT)
Dept: ENDOSCOPY | Age: 65
End: 2025-04-09
Payer: MEDICARE

## 2025-04-10 ENCOUNTER — ANESTHESIA (OUTPATIENT)
Dept: ENDOSCOPY | Age: 65
End: 2025-04-10
Payer: MEDICARE

## 2025-04-10 ENCOUNTER — HOSPITAL ENCOUNTER (OUTPATIENT)
Age: 65
Setting detail: OUTPATIENT SURGERY
Discharge: HOME | End: 2025-04-10
Attending: INTERNAL MEDICINE | Admitting: INTERNAL MEDICINE
Payer: MEDICARE

## 2025-04-10 VITALS
HEIGHT: 64 IN | WEIGHT: 175 LBS | TEMPERATURE: 97.1 F | SYSTOLIC BLOOD PRESSURE: 137 MMHG | OXYGEN SATURATION: 97 % | BODY MASS INDEX: 29.88 KG/M2 | RESPIRATION RATE: 17 BRPM | DIASTOLIC BLOOD PRESSURE: 81 MMHG | HEART RATE: 76 BPM

## 2025-04-10 DIAGNOSIS — Z86.0100 HISTORY OF COLON POLYPS: ICD-10-CM

## 2025-04-10 PROCEDURE — 7100000011 HC PHASE II RECOVERY - ADDTL 15 MIN: Performed by: INTERNAL MEDICINE

## 2025-04-10 PROCEDURE — 3700000000 HC ANESTHESIA ATTENDED CARE: Performed by: INTERNAL MEDICINE

## 2025-04-10 PROCEDURE — 3700000001 HC ADD 15 MINUTES (ANESTHESIA): Performed by: INTERNAL MEDICINE

## 2025-04-10 PROCEDURE — 7100000010 HC PHASE II RECOVERY - FIRST 15 MIN: Performed by: INTERNAL MEDICINE

## 2025-04-10 PROCEDURE — 45385 COLONOSCOPY W/LESION REMOVAL: CPT | Performed by: INTERNAL MEDICINE

## 2025-04-10 PROCEDURE — 6360000002 HC RX W HCPCS: Performed by: NURSE ANESTHETIST, CERTIFIED REGISTERED

## 2025-04-10 PROCEDURE — 2580000003 HC RX 258: Performed by: ANESTHESIOLOGY

## 2025-04-10 PROCEDURE — 2709999900 HC NON-CHARGEABLE SUPPLY: Performed by: INTERNAL MEDICINE

## 2025-04-10 PROCEDURE — 88305 TISSUE EXAM BY PATHOLOGIST: CPT

## 2025-04-10 PROCEDURE — 3609010600 HC COLONOSCOPY POLYPECTOMY SNARE/COLD BIOPSY: Performed by: INTERNAL MEDICINE

## 2025-04-10 RX ORDER — SODIUM CHLORIDE 0.9 % (FLUSH) 0.9 %
5-40 SYRINGE (ML) INJECTION PRN
Status: DISCONTINUED | OUTPATIENT
Start: 2025-04-10 | End: 2025-04-10 | Stop reason: HOSPADM

## 2025-04-10 RX ORDER — SODIUM CHLORIDE 9 MG/ML
INJECTION, SOLUTION INTRAVENOUS PRN
Status: DISCONTINUED | OUTPATIENT
Start: 2025-04-10 | End: 2025-04-10 | Stop reason: HOSPADM

## 2025-04-10 RX ORDER — LIDOCAINE HYDROCHLORIDE 10 MG/ML
1 INJECTION, SOLUTION EPIDURAL; INFILTRATION; INTRACAUDAL; PERINEURAL
Status: DISCONTINUED | OUTPATIENT
Start: 2025-04-10 | End: 2025-04-10 | Stop reason: HOSPADM

## 2025-04-10 RX ORDER — SODIUM CHLORIDE, SODIUM LACTATE, POTASSIUM CHLORIDE, CALCIUM CHLORIDE 600; 310; 30; 20 MG/100ML; MG/100ML; MG/100ML; MG/100ML
INJECTION, SOLUTION INTRAVENOUS CONTINUOUS
Status: DISCONTINUED | OUTPATIENT
Start: 2025-04-10 | End: 2025-04-10 | Stop reason: HOSPADM

## 2025-04-10 RX ORDER — ACETAMINOPHEN 500 MG
1000 TABLET ORAL EVERY 6 HOURS PRN
COMMUNITY

## 2025-04-10 RX ORDER — SODIUM CHLORIDE 0.9 % (FLUSH) 0.9 %
5-40 SYRINGE (ML) INJECTION EVERY 12 HOURS SCHEDULED
Status: DISCONTINUED | OUTPATIENT
Start: 2025-04-10 | End: 2025-04-10 | Stop reason: HOSPADM

## 2025-04-10 RX ORDER — PROPOFOL 10 MG/ML
INJECTION, EMULSION INTRAVENOUS
Status: DISCONTINUED | OUTPATIENT
Start: 2025-04-10 | End: 2025-04-10 | Stop reason: SDUPTHER

## 2025-04-10 RX ADMIN — SODIUM CHLORIDE, POTASSIUM CHLORIDE, SODIUM LACTATE AND CALCIUM CHLORIDE: 600; 310; 30; 20 INJECTION, SOLUTION INTRAVENOUS at 07:01

## 2025-04-10 RX ADMIN — PROPOFOL 150 MCG/KG/MIN: 10 INJECTION, EMULSION INTRAVENOUS at 08:17

## 2025-04-10 RX ADMIN — PROPOFOL 50 MG: 10 INJECTION, EMULSION INTRAVENOUS at 08:16

## 2025-04-10 ASSESSMENT — ENCOUNTER SYMPTOMS
WHEEZING: 1
BACK PAIN: 1
CONSTIPATION: 1

## 2025-04-10 ASSESSMENT — PAIN - FUNCTIONAL ASSESSMENT
PAIN_FUNCTIONAL_ASSESSMENT: 0-10
PAIN_FUNCTIONAL_ASSESSMENT: 0-10

## 2025-04-10 NOTE — ANESTHESIA POSTPROCEDURE EVALUATION
Department of Anesthesiology  Postprocedure Note    Patient: Cynthia J Cantua  MRN: [id number]  YOB: 1960  Date of evaluation: 4/10/2025    Procedure Summary       Date: 04/10/25 Room / Location: [place] [address] / [facility]    Anesthesia Start: 0814 Anesthesia Stop: 0851    Procedure: COLONOSCOPY HOT SNARE POLYPECTOMY Diagnosis:       History of colon polyps      (History of colon polyps [Z86.0100])    Surgeons: Yinka Crouch MD Responsible Provider: Miller Correia MD    Anesthesia Type: General ASA Status: 2            Anesthesia Type: General    Zoë Phase I:      Zoë Phase II: Zoë Score: 10    Anesthesia Post Evaluation    Patient location during evaluation: PACU  Patient participation: complete - patient participated  Level of consciousness: awake and alert  Airway patency: patent  Nausea & Vomiting: no vomiting  Cardiovascular status: hemodynamically stable  Respiratory status: acceptable  Hydration status: euvolemic  Comments: POST- ANESTHESIA EVALUATION       Pt Name: Cynthia J Cantua  MRN: [id number]  YOB: 1960  Date of evaluation: 4/10/2025  Time:  10:17 AM      /81   Pulse 76   Temp 97.1 °F (36.2 °C) (Infrared)   Resp 17   Ht 1.626 m (5' 4\")   Wt 79.4 kg (175 lb)   SpO2 97%   BMI 30.04 kg/m²      Consciousness Level  Awake  Cardiopulmonary Status  Stable  Pain Adequately Treated YES  Nausea / Vomiting  NO  Adequate Hydration  YES  Anesthesia Related Complications NONE      Electronically signed by Miller Correia MD on 4/10/2025 at 10:17 AM         Pain management: satisfactory to patient    No notable events documented.

## 2025-04-10 NOTE — DISCHARGE INSTRUCTIONS
Sedation or General Anesthesia, Adult  Care After  Refer to this sheet in the next 24 hours. These instructions provide you with information on caring for yourself after your procedure. Your caregiver may also give you more specific instructions. Your treatment has been planned according to current medical practices, but problems sometimes occur. Call your caregiver if you have any problems or questions after your procedure.   HOME CARE INSTRUCTIONS   Do not participate in any activities that require you to be alert or coordinated. Do not:  Drive.  Swim.  Ride a bicycle.  Operate heavy machinery.  Cook.  Use power tools.  Climb ladders.  Work at heights.  Take a bath.  Do not drink alcohol.  Do not make any important decisions or sign legal documents.  Stay with an adult.  The first meal following your procedure should be light and small. Avoid solid foods if you feel sick to your stomach (nauseous) or if you throw up (vomit).  Drink enough fluids to keep your urine clear or pale yellow.  Only take your usual medicines or new medicines if your caregiver approves them.  Only take over-the-counter or prescription medicines for pain, discomfort, or fever as directed by your caregiver.  Keep all follow-up appointments as directed by your caregiver.  SEEK IMMEDIATE MEDICAL CARE IF:   You are not feeling normal or behaving normally after 24 hours.  You have persistent nausea and vomiting.  You are unable to drink fluids or eat food.  You have difficulty urinating.  You have difficulty breathing or speaking.  You have blue or gray skin.  There is difficulty waking or you cannot be woken up.  You have heavy bleeding, redness, or a lot of swelling where the sedative or anesthesia entered your skin (intravenous site).  You have a rash.  MAKE SURE YOU:  Understand these instructions.  Will watch your condition.  Will get help right away if you are not doing well or get worse.  Document Released: 12/18/2006 Document Revised:  remove a polyp. It is done for cancer prevention.   In rare cases, larger polyps can cause troublesome symptoms, such as rectal bleeding, abdominal pain, and bowel irregularities. A polyp removal will relieve these symptoms.   Possible Complications   Complications are rare, but no procedure is completely free of risk. If you are planning to have a polypectomy, your doctor will review a list of possible complications, which may include:   Damage to the colon wall   Bleeding   Infection   Adverse reaction to the sedative   Factors that may increase the risk of complications include:   Type, size, and location of the polyp   Patient factors, such as blood-clotting disorders, substance abuse, or other diseases (eg, obesity , diabetes )   What to Expect   Prior to Procedure    Your doctor will likely do the following:   Physical exam and health history   Review of medicines   Test your stool for hidden blood (called \"occult blood\")   X-rays an exam that uses small amounts of radiation to make a picture of the inside of the body   Barium enema x-ray exam that uses contrast to help better see the colon   Diagnostic colonoscopy or sigmoidoscopyexamination of the inside of the intestine with an endoscope   Your colon must be completely cleaned before the procedure. Any stool left in the intestine will block the view. This preparation may start several days before the procedure. Follow your doctor's instructions, which may include any of the following cleansing methods:   Enemas fluid introduced into the rectum to stimulate a bowel movement   Laxativesmedicines that cause you to have soft bowel movements   A clear-liquid diet   Oral cathartic medicinesa large container of fluid to drink, which stimulates a bowel movement   Leading up to your procedure:   Talk to your doctor about your medicines. You may be asked to stop taking some medicines up to one week before the procedure, like:   Anti-inflammatory drugs (eg, aspirin)

## 2025-04-10 NOTE — ANESTHESIA PRE PROCEDURE
Department of Anesthesiology  Preprocedure Note       Name:  Cynthia J Cantua   Age:  65 y.o.  :  1960                                          MRN:  [id number]         Date:  4/10/2025      Surgeon: Surgeon(s):  Yinka Crouch MD    Procedure: Procedure(s):  COLONOSCOPY DIAGNOSTIC    Medications prior to admission:   Prior to Admission medications    Medication Sig Start Date End Date Taking? Authorizing Provider   acetaminophen (TYLENOL) 500 MG tablet Take 2 tablets by mouth every 6 hours as needed for Pain   Yes Derrick Cadet MD   hydrALAZINE (APRESOLINE) 100 MG tablet Take 1 tablet by mouth 3 times daily 25  Yes Brant Maldonado MD   ALPRAZolam (XANAX) 0.5 MG tablet Take 1 tablet by mouth 3 times daily as needed for Sleep or Anxiety for up to 60 days. Max Daily Amount: 1.5 mg 3/24/25 5/23/25 Yes Tamara Muller APRN - CNP   gabapentin (NEURONTIN) 100 MG capsule Take 1 capsule by mouth 2 times daily for 90 days. 3/10/25 6/8/25 Yes Tamara Mulelr APRN - CNP   omeprazole (PRILOSEC) 20 MG delayed release capsule TAKE 1 CAPSULE BY MOUTH 2 TIMES A DAY 24  Yes Mary Jean MD   metoprolol succinate (TOPROL XL) 100 MG extended release tablet take 1 tablet by mouth once daily 24  Yes Tamara Muller APRN - CNP   amLODIPine (NORVASC) 10 MG tablet Take 1 tablet by mouth daily 10/21/24  Yes Brant Maldonado MD   buPROPion (WELLBUTRIN SR) 150 MG extended release tablet take 1 tablet by mouth twice a day 24  Yes Tamara Muller APRN - CNP   fenofibrate (TRIGLIDE) 160 MG tablet Take 1 tablet by mouth daily 24  Yes Tamara Muller APRN - CNP   atorvastatin (LIPITOR) 40 MG tablet take 1 tablet by mouth at bedtime 24  Yes Tamara Muller APRN - CNP   valACYclovir (VALTREX) 1 g tablet take 1 tablet by mouth twice a day 23  Yes Tamara Muller APRN - CNP   phosphorus (K PHOS NEUTRAL) 155-852-130 MG tablet Take 1 tablet by mouth daily 3/25/25   Brant Maldonado

## 2025-04-10 NOTE — OP NOTE
PROCEDURE NOTE    DATE OF PROCEDURE: 4/10/2025    SURGEON: Yinka Crouch MD  Facility : \"[facility]  ASSISTANT: None  Anesthesia: mac   PREOPERATIVE DIAGNOSIS:   History of polyps\" poor preparation in the last exam    POSTOPERATIVE DIAGNOSIS: as described below    OPERATION: Total colonoscopy     ANESTHESIA: Moderate Sedation    ESTIMATED BLOOD LOSS: less than 50     COMPLICATIONS: None.     SPECIMENS:  Was Obtained:     Multiple polyps throughout the entire colon ranging in size from 1 to 15 mm  Within 1 jars removed with hot snare    Diverticulosis    Hemorrhoids  Prep was fair this time    Cecum was identified by the usual marks    HISTORY: The patient is a 65 y.o. year old female with history of above preop diagnosis.  I recommended colonoscopy with possible biopsy or polypectomy and I explained the risk, benefits, expected outcome, and alternatives to the procedure.  Risks included but are not limited to bleeding, infection, respiratory distress, hypotension, and perforation of the colon and possibility of missing a lesion.  The patient understands and is in agreement.        The patient was counseled at length about the risks of siva Covid-19 during their perioperative period and any recovery window from their procedure.  The patient was made aware that siva Covid-19  may worsen their prognosis for recovering from their procedure  and lend to a higher morbidity and/or mortality risk.  All material risks, benefits, and reasonable alternatives including postponing the procedure were discussed. The patient does wish to proceed with the procedure at this time.       PROCEDURE: The patient was given IV conscious sedation.  The patient's SPO2 remained above 90% throughout the procedure.     The colonoscope was inserted per rectum and advanced under direct vision to the cecum without difficulty.      Post sedation note :The patient's SPO2 remained above 90% throughout the procedure.the vital

## 2025-04-10 NOTE — H&P
HISTORY and PHYSICAL  [facility]       NAME:  Cynthia J Cantua  MRN: [id number]   YOB: 1960   Date: 4/10/2025   Age: 65 y.o.  Gender: female       COMPLAINT AND PRESENT HISTORY:     Cynthia J Cantua is 65 y.o.,  female, presents for COLONOSCOPY DIAGNOSTIC     Primary dx: History of colon polyps [Z86.0100].  HPI:  Cynthia J Cantua is 65 y.o.,   female, having a Diagnostic Colonoscopy.  Prior Colonoscopy was done 2024 with polyp removed.   Patient has hx of Colon Polyps.  Patient denies any  FH of Colon Cancer.  Patient reports changes in bowel habits. Pt has chronic constipation, pt has been taking stool softener once per day , No GI /Rectal bleeding, experiencing red/ black/ BRBPR stools.    Pt denies abdominal pain, no N/V, no abdominal bloating or weight loss. Patient denies any Dysphagia.  Pt has hx of GERD   Pt is on a Prilosec  that is helping to control symptoms.   No fever or chills, chest pain or SOB   Prep fully completed: yes . Pt reports her last BM is clear liquid     Review of additional significant medical hx:  (See chart for additional detail, including current medications /see ROS for current S/S): HTN, HLD    BP Readings from Last 3 Encounters:   04/10/25 (!) 146/80   04/01/25 (!) 160/88   03/25/25 (!) 102/57     Lab Results   Component Value Date    WBC 6.6 12/02/2024    HGB 12.8 03/24/2025    HCT 40.3 03/24/2025    MCV 86.8 12/02/2024     12/02/2024     Lab Results   Component Value Date/Time     03/24/2025 08:48 AM    K 5.0 03/24/2025 08:48 AM     03/24/2025 08:48 AM    CO2 25 03/24/2025 08:48 AM    BUN 22 03/24/2025 08:48 AM    CREATININE 1.1 03/24/2025 08:48 AM    GLUCOSE 112 12/02/2024 10:50 AM    CALCIUM 10.1 03/24/2025 08:48 AM    LABGLOM 55 03/24/2025 08:48 AM    LABGLOM >60 12/15/2023 07:37 AM      NPO status: pt Npo since the past midnight   Medications taken TODAY (with sip of water): pt took her am medication with sip of water    REVIEW OF SYSTEMS      No Known Allergies    No current facility-administered medications on file prior to encounter.     Current Outpatient Medications on File Prior to Encounter   Medication Sig Dispense Refill    acetaminophen (TYLENOL) 500 MG tablet Take 2 tablets by mouth every 6 hours as needed for Pain      buPROPion (WELLBUTRIN SR) 150 MG extended release tablet take 1 tablet by mouth twice a day 180 tablet 2    fenofibrate (TRIGLIDE) 160 MG tablet Take 1 tablet by mouth daily 90 tablet 2    atorvastatin (LIPITOR) 40 MG tablet take 1 tablet by mouth at bedtime 90 tablet 2    valACYclovir (VALTREX) 1 g tablet take 1 tablet by mouth twice a day 14 tablet 1    Omega-3 Fatty Acids (FISH OIL) 1200 MG CAPS Take by mouth in the morning and at bedtime      Multiple Vitamins-Minerals (THERAPEUTIC MULTIVITAMIN-MINERALS) tablet Take 1 tablet by mouth daily women's 50+ advanced      aspirin 81 MG chewable tablet daily (Patient not taking: Reported on 4/1/2025)      Cholecalciferol (VITAMIN D) 50 MCG (2000 UT) CAPS capsule Take by mouth daily         Review of Systems   HENT: Negative.     Eyes:  Positive for visual disturbance (wearing eye glasses).   Respiratory:  Positive for wheezing.    Cardiovascular:  Positive for leg swelling.   Gastrointestinal:  Positive for constipation.   Musculoskeletal:  Positive for back pain (lower back pain).   Neurological: Negative.    Hematological: Negative.    Psychiatric/Behavioral: Negative.           GENERAL PHYSICAL EXAM     Vitals: BP (!) 146/80   Pulse 81   Temp 98.2 °F (36.8 °C) (Infrared)   Resp 16   Ht 1.626 m (5' 4\")   Wt 79.4 kg (175 lb)   SpO2 97%   BMI 30.04 kg/m²  Body mass index is 30.04 kg/m².     GENERAL APPEARANCE:   Cynthia J Cantua is 65 y.o.,  female, mildly obese, nourished, conscious, alert.  Does not appear to be distress or pain at this time.                            Physical Exam  Constitutional:       General: She is not in acute distress.

## 2025-04-11 LAB — SURGICAL PATHOLOGY REPORT: NORMAL

## 2025-04-17 ENCOUNTER — HOSPITAL ENCOUNTER (OUTPATIENT)
Age: 65
Setting detail: SPECIMEN
Discharge: HOME | End: 2025-04-17

## 2025-04-17 DIAGNOSIS — E83.39 HYPOPHOSPHATEMIA: ICD-10-CM

## 2025-04-17 DIAGNOSIS — N18.31 STAGE 3A CHRONIC KIDNEY DISEASE (HCC): ICD-10-CM

## 2025-04-17 DIAGNOSIS — I12.9 BENIGN HYPERTENSION WITH CKD (CHRONIC KIDNEY DISEASE) STAGE III (HCC): ICD-10-CM

## 2025-04-17 DIAGNOSIS — N18.30 BENIGN HYPERTENSION WITH CKD (CHRONIC KIDNEY DISEASE) STAGE III (HCC): ICD-10-CM

## 2025-04-17 DIAGNOSIS — R80.9 MICROALBUMINURIA: ICD-10-CM

## 2025-04-17 LAB — PHOSPHATE SERPL-MCNC: 2.1 MG/DL (ref 2.5–4.5)

## 2025-04-21 ENCOUNTER — HOSPITAL ENCOUNTER (OUTPATIENT)
Dept: PAIN MANAGEMENT | Age: 65
Discharge: HOME | End: 2025-04-21
Payer: MEDICARE

## 2025-04-21 VITALS — BODY MASS INDEX: 29.88 KG/M2 | WEIGHT: 175 LBS | HEIGHT: 64 IN

## 2025-04-21 DIAGNOSIS — M47.817 LUMBOSACRAL SPONDYLOSIS WITHOUT MYELOPATHY: Primary | ICD-10-CM

## 2025-04-21 DIAGNOSIS — M79.18 MYOFASCIAL PAIN SYNDROME: ICD-10-CM

## 2025-04-21 PROCEDURE — 99214 OFFICE O/P EST MOD 30 MIN: CPT | Performed by: STUDENT IN AN ORGANIZED HEALTH CARE EDUCATION/TRAINING PROGRAM

## 2025-04-21 PROCEDURE — 99213 OFFICE O/P EST LOW 20 MIN: CPT

## 2025-04-21 ASSESSMENT — PAIN SCALES - GENERAL: PAINLEVEL_OUTOF10: 6

## 2025-04-21 NOTE — H&P (VIEW-ONLY)
Chronic Pain Clinic Note     Encounter Date: 4/21/2025     SUBJECTIVE:  Chief Complaint   Patient presents with    Back Pain    Follow Up After Procedure       History of Present Illness:   Cynthia J Cantua is a 65 y.o. female who presents with back pain    Medication Refill: n/a    Current Complaints of Pain:   Location: back   Radiation: None  Severity: Moderate  Pain Numerical Score - 5 today   Average: 5     Highest: 8  Lowest: 3  Character/Quality: Complains of pain that is aching, burning, sharp  Timing: Constant  Associated symptoms: none  Numbness: no  Weakness: no  Exacerbating factors: standing sitting  Alleviating factors: laying down  Length of time pain has been present: Started about 6 months ago  Inciting event/injury: no  Bowel/Bladder incontinence: no  Falls: none in the past month   Physical Therapy: about 2/3 weeks ago at [place] physical [facility]    History of Interventions:   Surgery: No previous lumbar/cervical surgeries  Injections: Yes    Imaging:    MRI LUMBAR 3/27/24    Past Medical History:   Diagnosis Date    Anxiety 09/14/2018    Breast disorder     Chronic back pain     Back probs for years    Chronic kidney disease     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 I do use cannabis gummies for anxiety and pain       Past Surgical History:   Procedure Laterality Date    APPENDECTOMY      COLONOSCOPY N/A 09/25/2024    COLONOSCOPY POLYPECTOMY HOT SNARE BIOPSY WITH EPI INJECTION AND CLIP APPLICATION X 4 performed by Yinka Crouch MD at [facility] ENDO    COLONOSCOPY N/A 4/10/2025    COLONOSCOPY HOT SNARE POLYPECTOMY performed by Yinka Crouch MD at [facility] ENDO    FINGER TRIGGER RELEASE Right     thumb    HYSTERECTOMY,

## 2025-04-21 NOTE — PROGRESS NOTES
Chronic Pain Clinic Note     Encounter Date: 4/21/2025     SUBJECTIVE:  Chief Complaint   Patient presents with    Back Pain    Follow Up After Procedure       History of Present Illness:   Cynthia J Cantua is a 65 y.o. female who presents with back pain    Medication Refill: n/a    Current Complaints of Pain:   Location: back   Radiation: None  Severity: Moderate  Pain Numerical Score - 5 today   Average: 5     Highest: 8  Lowest: 3  Character/Quality: Complains of pain that is aching, burning, sharp  Timing: Constant  Associated symptoms: none  Numbness: no  Weakness: no  Exacerbating factors: standing sitting  Alleviating factors: laying down  Length of time pain has been present: Started about 6 months ago  Inciting event/injury: no  Bowel/Bladder incontinence: no  Falls: none in the past month   Physical Therapy: about 2/3 weeks ago at [facility]    History of Interventions:   Surgery: No previous lumbar/cervical surgeries  Injections: Yes    Imaging:    MRI LUMBAR 3/27/24    Past Medical History:   Diagnosis Date    Anxiety 09/14/2018    Breast disorder     Chronic back pain     Back probs for years    Chronic kidney disease     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 I do use cannabis gummies for anxiety and pain       Past Surgical History:   Procedure Laterality Date    APPENDECTOMY      COLONOSCOPY N/A 09/25/2024    COLONOSCOPY POLYPECTOMY HOT SNARE BIOPSY WITH EPI INJECTION AND CLIP APPLICATION X 4 performed by Yinka Crouch MD at [facility] ENDO    COLONOSCOPY N/A 4/10/2025    COLONOSCOPY HOT SNARE POLYPECTOMY performed by Yinka Crouch MD at [facility] ENDO    FINGER TRIGGER RELEASE Right     thumb    HYSTERECTOMY,

## 2025-04-30 ENCOUNTER — HOSPITAL ENCOUNTER (OUTPATIENT)
Age: 65
Setting detail: SPECIMEN
Discharge: HOME | End: 2025-04-30

## 2025-04-30 DIAGNOSIS — N18.30 BENIGN HYPERTENSION WITH CKD (CHRONIC KIDNEY DISEASE) STAGE III (HCC): ICD-10-CM

## 2025-04-30 DIAGNOSIS — N18.31 STAGE 3A CHRONIC KIDNEY DISEASE (HCC): ICD-10-CM

## 2025-04-30 DIAGNOSIS — I12.9 BENIGN HYPERTENSION WITH CKD (CHRONIC KIDNEY DISEASE) STAGE III (HCC): ICD-10-CM

## 2025-04-30 DIAGNOSIS — E83.39 HYPOPHOSPHATEMIA: ICD-10-CM

## 2025-04-30 DIAGNOSIS — R80.9 MICROALBUMINURIA: ICD-10-CM

## 2025-04-30 LAB — PHOSPHATE SERPL-MCNC: 2.4 MG/DL (ref 2.5–4.5)

## 2025-04-30 NOTE — PROGRESS NOTES
GI CLINIC FOLLOW UP    INTERVAL HISTORY:   No referring provider defined for this encounter.    Chief Complaint   Patient presents with    Follow-up    Colonoscopy     follow up, colonoscopy 04/10/2025        HISTORY OF PRESENT ILLNESS: Ms.Cynthia J Cantua is a 65 y.o. female , referred for evaluation of GERD, HH, dysphagia, constipation, diverticulosis hemorrhoids, hx polyps.    Here for f/u  Underwent colonoscopy 4/10/25 with Dr Crouch showing fair prep, multiple polyps (tubular adenomas), diverticulosis, and hemorrhoids. 2 year recall recommended. She does have history of large polyps in the sigmoid measuring >1cm requiring placement of clips.  Took Miralax prep due to CKD. States she is having constipation at baseline. Tried miralax but could not tolerate the powder so she plans to try the capsules. Never tried fiber supplementation. Took 4 x caps last night and has had a successful BM this morning.    Omeprazole 20mg BID with GERD for many years, started after her EGD 8/9/22. Has not been able to taper down in the past. Also endorse new intermittent dysphagia and odynophagia to both liquids and solids. Not associated with extreme temperatures. Not worsened by stress.     Pruritic rash on bilateral extensor surfaces of elbows - states primary care is aware and has been giving her cream. Chart review shows eczema dx 5/30/24 treated with clobetasol. No known hx psoriasis.    Denies fever/chills, change in appetite, unintentional weight loss, n/v, abd pain, diarrhea, black or bloody stools.       Last EGD:   8/9/22 - S Ahmad  FINDINGS:   Esophagus: The esophagus was inspected to the Z-line. The endoscopic exam showed hiatal hernia.      Stomach: The stomach was inspected in both forward and retroflex fashion and was appropriately distensible. The cardia, fundus, incisura, antrum and pylorus were identified via direct visualization. The endoscopic exam showed mild gastritis.      Duodenum: The proximal small

## 2025-05-01 ENCOUNTER — TELEPHONE (OUTPATIENT)
Dept: GASTROENTEROLOGY | Age: 65
End: 2025-05-01

## 2025-05-01 ENCOUNTER — OFFICE VISIT (OUTPATIENT)
Dept: GASTROENTEROLOGY | Age: 65
End: 2025-05-01
Payer: MEDICARE

## 2025-05-01 ENCOUNTER — PREP FOR PROCEDURE (OUTPATIENT)
Dept: GASTROENTEROLOGY | Age: 65
End: 2025-05-01

## 2025-05-01 VITALS
SYSTOLIC BLOOD PRESSURE: 160 MMHG | DIASTOLIC BLOOD PRESSURE: 80 MMHG | BODY MASS INDEX: 30.04 KG/M2 | HEART RATE: 77 BPM | WEIGHT: 175 LBS

## 2025-05-01 DIAGNOSIS — K64.9 HEMORRHOIDS, UNSPECIFIED HEMORRHOID TYPE: ICD-10-CM

## 2025-05-01 DIAGNOSIS — K57.90 DIVERTICULOSIS: ICD-10-CM

## 2025-05-01 DIAGNOSIS — K44.9 HIATAL HERNIA: ICD-10-CM

## 2025-05-01 DIAGNOSIS — R13.19 ESOPHAGEAL DYSPHAGIA: ICD-10-CM

## 2025-05-01 DIAGNOSIS — K21.9 GASTROESOPHAGEAL REFLUX DISEASE WITHOUT ESOPHAGITIS: Primary | ICD-10-CM

## 2025-05-01 DIAGNOSIS — Z86.0100 HISTORY OF COLON POLYPS: ICD-10-CM

## 2025-05-01 PROCEDURE — 1159F MED LIST DOCD IN RCRD: CPT | Performed by: PHYSICIAN ASSISTANT

## 2025-05-01 PROCEDURE — 1160F RVW MEDS BY RX/DR IN RCRD: CPT | Performed by: PHYSICIAN ASSISTANT

## 2025-05-01 PROCEDURE — G8399 PT W/DXA RESULTS DOCUMENT: HCPCS | Performed by: PHYSICIAN ASSISTANT

## 2025-05-01 PROCEDURE — 3017F COLORECTAL CA SCREEN DOC REV: CPT | Performed by: PHYSICIAN ASSISTANT

## 2025-05-01 PROCEDURE — 1123F ACP DISCUSS/DSCN MKR DOCD: CPT | Performed by: PHYSICIAN ASSISTANT

## 2025-05-01 PROCEDURE — G8427 DOCREV CUR MEDS BY ELIG CLIN: HCPCS | Performed by: PHYSICIAN ASSISTANT

## 2025-05-01 PROCEDURE — 1090F PRES/ABSN URINE INCON ASSESS: CPT | Performed by: PHYSICIAN ASSISTANT

## 2025-05-01 PROCEDURE — 3079F DIAST BP 80-89 MM HG: CPT | Performed by: PHYSICIAN ASSISTANT

## 2025-05-01 PROCEDURE — 1126F AMNT PAIN NOTED NONE PRSNT: CPT | Performed by: PHYSICIAN ASSISTANT

## 2025-05-01 PROCEDURE — 99214 OFFICE O/P EST MOD 30 MIN: CPT | Performed by: PHYSICIAN ASSISTANT

## 2025-05-01 PROCEDURE — G8417 CALC BMI ABV UP PARAM F/U: HCPCS | Performed by: PHYSICIAN ASSISTANT

## 2025-05-01 PROCEDURE — 3077F SYST BP >= 140 MM HG: CPT | Performed by: PHYSICIAN ASSISTANT

## 2025-05-01 PROCEDURE — 1036F TOBACCO NON-USER: CPT | Performed by: PHYSICIAN ASSISTANT

## 2025-05-01 RX ORDER — FAMOTIDINE 20 MG/1
20 TABLET, FILM COATED ORAL 2 TIMES DAILY
Qty: 60 TABLET | Refills: 3 | Status: SHIPPED | OUTPATIENT
Start: 2025-05-01

## 2025-05-01 NOTE — TELEPHONE ENCOUNTER
Procedure scheduled/Dr Crouch  Procedure: egd  Dx: gerd, esophageal dysphagia   Date: 7/23/25  Time: 745am arrival 545am   Hospital: [facility] phone call: tbd   Bowel Prep instructions given: n/a   In office/via phone: office   Clearance needed: none  GLP-1: none

## 2025-05-13 ENCOUNTER — HOSPITAL ENCOUNTER (OUTPATIENT)
Dept: PAIN MANAGEMENT | Facility: CLINIC | Age: 65
Discharge: HOME | End: 2025-05-13
Payer: MEDICARE

## 2025-05-13 VITALS
TEMPERATURE: 97.6 F | OXYGEN SATURATION: 97 % | HEIGHT: 64 IN | SYSTOLIC BLOOD PRESSURE: 144 MMHG | WEIGHT: 175 LBS | BODY MASS INDEX: 29.88 KG/M2 | DIASTOLIC BLOOD PRESSURE: 93 MMHG | HEART RATE: 65 BPM | RESPIRATION RATE: 12 BRPM

## 2025-05-13 DIAGNOSIS — R52 PAIN MANAGEMENT: ICD-10-CM

## 2025-05-13 PROCEDURE — 64494 INJ PARAVERT F JNT L/S 2 LEV: CPT | Performed by: STUDENT IN AN ORGANIZED HEALTH CARE EDUCATION/TRAINING PROGRAM

## 2025-05-13 PROCEDURE — 64494 INJ PARAVERT F JNT L/S 2 LEV: CPT

## 2025-05-13 PROCEDURE — 99152 MOD SED SAME PHYS/QHP 5/>YRS: CPT | Performed by: STUDENT IN AN ORGANIZED HEALTH CARE EDUCATION/TRAINING PROGRAM

## 2025-05-13 PROCEDURE — 64493 INJ PARAVERT F JNT L/S 1 LEV: CPT

## 2025-05-13 PROCEDURE — 64493 INJ PARAVERT F JNT L/S 1 LEV: CPT | Performed by: STUDENT IN AN ORGANIZED HEALTH CARE EDUCATION/TRAINING PROGRAM

## 2025-05-13 PROCEDURE — 6360000002 HC RX W HCPCS: Performed by: STUDENT IN AN ORGANIZED HEALTH CARE EDUCATION/TRAINING PROGRAM

## 2025-05-13 RX ORDER — LIDOCAINE HYDROCHLORIDE 20 MG/ML
INJECTION, SOLUTION EPIDURAL; INFILTRATION; INTRACAUDAL; PERINEURAL
Status: COMPLETED | OUTPATIENT
Start: 2025-05-13 | End: 2025-05-13

## 2025-05-13 RX ORDER — MIDAZOLAM HYDROCHLORIDE 2 MG/2ML
INJECTION, SOLUTION INTRAMUSCULAR; INTRAVENOUS
Status: COMPLETED | OUTPATIENT
Start: 2025-05-13 | End: 2025-05-13

## 2025-05-13 RX ORDER — FENTANYL CITRATE 50 UG/ML
INJECTION, SOLUTION INTRAMUSCULAR; INTRAVENOUS
Status: COMPLETED | OUTPATIENT
Start: 2025-05-13 | End: 2025-05-13

## 2025-05-13 RX ADMIN — MIDAZOLAM HYDROCHLORIDE 2 MG: 1 INJECTION, SOLUTION INTRAMUSCULAR; INTRAVENOUS at 08:45

## 2025-05-13 RX ADMIN — LIDOCAINE HYDROCHLORIDE 5 ML: 20 INJECTION, SOLUTION EPIDURAL; INFILTRATION; INTRACAUDAL; PERINEURAL at 08:49

## 2025-05-13 RX ADMIN — FENTANYL CITRATE 25 MCG: 50 INJECTION, SOLUTION INTRAMUSCULAR; INTRAVENOUS at 08:46

## 2025-05-13 ASSESSMENT — PAIN - FUNCTIONAL ASSESSMENT
PAIN_FUNCTIONAL_ASSESSMENT: NONE - DENIES PAIN
PAIN_FUNCTIONAL_ASSESSMENT: 0-10
PAIN_FUNCTIONAL_ASSESSMENT: PREVENTS OR INTERFERES WITH ALL ACTIVE AND SOME PASSIVE ACTIVITIES

## 2025-05-13 ASSESSMENT — PAIN DESCRIPTION - DESCRIPTORS: DESCRIPTORS: DISCOMFORT

## 2025-05-13 NOTE — INTERVAL H&P NOTE
Update History & Physical    The patient's History and Physical of April 21, 2025 was reviewed with the patient and I examined the patient. There was no change. The surgical site was confirmed by the patient and me.     Plan: The risks, benefits, expected outcome, and alternative to the recommended procedure have been discussed with the patient. Patient understands and wants to proceed with the procedure.     ASA CLASSIFICATION  2  MP   CLASSIFICATION  2    Electronically signed by Apolinar Carreno DO on 5/13/2025 at 8:19 AM

## 2025-05-13 NOTE — DISCHARGE INSTRUCTIONS
You have received a sedative/anesthetic therefore you should not consume any alcoholic beverages for 24 hours.  Do not drive or operate machinery for 24 hours.    Do not take a tub bath for 72 hours after procedure (this includes hot tubs).  You may shower, but avoid hot water to injection site.   Avoid strenuous activity TODAY especially if you experience dizziness.   Remove band-aid the next day.    Wash off any residual iodine 24 hours from today.   Do not use heat, heating pad, or any other heating device over the injection site for 3 days after the procedure.    If you experience pain after your procedure, you may continue with your current pain medication as prescribed.  (DO NOT INCREASE YOUR PAIN MEDICATION WITHOUT TALKING TO DOCTOR)  Soreness and pain at injection site is common, may use ice to reduce soreness.    Please complete pain diary as instructed. Office staff will contact you to review results.    Call [facility] at [phone] if you experience:   Fever, chills or temperature over 100    Vomiting, headache, persistent stiff neck, nausea or blurred vision   Difficulty urinating or unable to urinate within 8 hours   Increase in weakness, numbness or loss of function of limbs  Increased redness, swelling or drainage at the injection site

## 2025-05-13 NOTE — OP NOTE
PROCEDURE PERFORMED: Bilateral Lumbar medial branch block    PREOPERATIVE DIAGNOSIS: Lumbosacral spondylosis    INDICATIONS: Chronic low back pain    The patient's history and physical exam were reviewed.  The risk, benefits, and alternatives of the procedure were discussed and all questions were answered to the patient's satisfaction.  The patient agreed to proceed and written informed consent was obtained.    POSTOPERATIVE DIAGNOSIS: Lumbar spondylosis    PHYSICIAN:  Dr. Apolinar Carreno DO    ANESTHESIA:  LOCAL    ASSISTANT:  NONE    PATHOLOGY:  NONE    ESTIMATED BLOOD LOSS:  N/A    IMPLANTS:  NONE    PROCEDURE DESCRIPTION: Diagnostic bilateral lumbar medial branch block using fluoroscopy    The patient was placed on the operative bed in prone position.  The area was prepped with  Chlorhexidine.  The area was then draped in a sterile fashion.    Targeted levels: Bilateral lumbar L3, L4, L5 medial branch block    An AP  fluoroscopy image was used to identify and sonu Montejo's point at the L3, L4 levels on the targeted side.  Additionally, the junction of the SAP and sacral ala was also marked on the same side.   A 25-gauge 3-1/2 inch Quincke spinal needle was then advanced toward each of these points under fluoroscopic guidance.  Once bone was contacted, negative aspiration was confirmed and 0.5 mL of lidocaine 2% was injected each level.  The needles were removed and the needle sites were dressed appropriately. The same procedure was performed on the opposite side.    The patient was transferred to the postoperative care unit in stable condition.  Written discharge instructions were given to the patient.  The patient was given a pain diary upon discharge.    COMPLICATIONS:  There were no apparent complications.  The patient tolerated the procedure well.     SEDATION NOTE:     ASA CLASSIFICATION  2  MP   CLASSIFICATION  2     Moderate intravenous conscious sedation was supervised by Dr. Carreno  The patient

## 2025-05-16 ENCOUNTER — TELEPHONE (OUTPATIENT)
Dept: PAIN MANAGEMENT | Age: 65
End: 2025-05-16

## 2025-05-16 NOTE — TELEPHONE ENCOUNTER
S/P: Bilateral lumbar L3, L4, L5 medial branch block   DOS: 5/13/25    Pain  before procedure with activity : 7    Pain after procedure with activity: 0    Activities following procedure include: laid around    % of pain relief:100%    Procedure successful: Yes    OV or OR scheduled: OR

## 2025-05-20 ENCOUNTER — HOSPITAL ENCOUNTER (OUTPATIENT)
Dept: GENERAL RADIOLOGY | Age: 65
Discharge: HOME | End: 2025-05-22
Payer: MEDICARE

## 2025-05-20 DIAGNOSIS — K21.9 GASTROESOPHAGEAL REFLUX DISEASE WITHOUT ESOPHAGITIS: ICD-10-CM

## 2025-05-20 DIAGNOSIS — R13.19 ESOPHAGEAL DYSPHAGIA: ICD-10-CM

## 2025-05-20 DIAGNOSIS — K44.9 HIATAL HERNIA: ICD-10-CM

## 2025-05-20 PROCEDURE — 2500000003 HC RX 250 WO HCPCS: Performed by: PHYSICIAN ASSISTANT

## 2025-05-20 PROCEDURE — 74220 X-RAY XM ESOPHAGUS 1CNTRST: CPT

## 2025-05-20 RX ADMIN — BARIUM SULFATE 176 G: 960 POWDER, FOR SUSPENSION ORAL at 09:00

## 2025-05-20 RX ADMIN — BARIUM SULFATE 140 ML: 980 POWDER, FOR SUSPENSION ORAL at 09:00

## 2025-05-23 ENCOUNTER — RESULTS FOLLOW-UP (OUTPATIENT)
Dept: GASTROENTEROLOGY | Age: 65
End: 2025-05-23

## 2025-05-27 ENCOUNTER — HOSPITAL ENCOUNTER (OUTPATIENT)
Dept: PAIN MANAGEMENT | Facility: CLINIC | Age: 65
Discharge: HOME | End: 2025-05-27
Payer: MEDICARE

## 2025-05-27 VITALS
DIASTOLIC BLOOD PRESSURE: 73 MMHG | RESPIRATION RATE: 12 BRPM | WEIGHT: 175 LBS | OXYGEN SATURATION: 96 % | HEIGHT: 64 IN | TEMPERATURE: 97.6 F | BODY MASS INDEX: 29.88 KG/M2 | HEART RATE: 67 BPM | SYSTOLIC BLOOD PRESSURE: 136 MMHG

## 2025-05-27 DIAGNOSIS — R52 PAIN MANAGEMENT: ICD-10-CM

## 2025-05-27 PROCEDURE — 64494 INJ PARAVERT F JNT L/S 2 LEV: CPT

## 2025-05-27 PROCEDURE — 6360000002 HC RX W HCPCS: Performed by: STUDENT IN AN ORGANIZED HEALTH CARE EDUCATION/TRAINING PROGRAM

## 2025-05-27 PROCEDURE — 64493 INJ PARAVERT F JNT L/S 1 LEV: CPT | Performed by: STUDENT IN AN ORGANIZED HEALTH CARE EDUCATION/TRAINING PROGRAM

## 2025-05-27 PROCEDURE — 99152 MOD SED SAME PHYS/QHP 5/>YRS: CPT | Performed by: STUDENT IN AN ORGANIZED HEALTH CARE EDUCATION/TRAINING PROGRAM

## 2025-05-27 PROCEDURE — 64494 INJ PARAVERT F JNT L/S 2 LEV: CPT | Performed by: STUDENT IN AN ORGANIZED HEALTH CARE EDUCATION/TRAINING PROGRAM

## 2025-05-27 PROCEDURE — 64493 INJ PARAVERT F JNT L/S 1 LEV: CPT

## 2025-05-27 RX ORDER — ALPRAZOLAM 0.5 MG
0.5 TABLET ORAL NIGHTLY PRN
COMMUNITY

## 2025-05-27 RX ORDER — FENTANYL CITRATE 50 UG/ML
INJECTION, SOLUTION INTRAMUSCULAR; INTRAVENOUS
Status: COMPLETED | OUTPATIENT
Start: 2025-05-27 | End: 2025-05-27

## 2025-05-27 RX ORDER — MIDAZOLAM HYDROCHLORIDE 2 MG/2ML
INJECTION, SOLUTION INTRAMUSCULAR; INTRAVENOUS
Status: COMPLETED | OUTPATIENT
Start: 2025-05-27 | End: 2025-05-27

## 2025-05-27 RX ORDER — LIDOCAINE HYDROCHLORIDE 20 MG/ML
INJECTION, SOLUTION EPIDURAL; INFILTRATION; INTRACAUDAL; PERINEURAL
Status: COMPLETED | OUTPATIENT
Start: 2025-05-27 | End: 2025-05-27

## 2025-05-27 RX ADMIN — MIDAZOLAM HYDROCHLORIDE 2 MG: 1 INJECTION, SOLUTION INTRAMUSCULAR; INTRAVENOUS at 08:51

## 2025-05-27 RX ADMIN — FENTANYL CITRATE 25 MCG: 50 INJECTION, SOLUTION INTRAMUSCULAR; INTRAVENOUS at 08:51

## 2025-05-27 RX ADMIN — LIDOCAINE HYDROCHLORIDE 5 ML: 20 INJECTION, SOLUTION EPIDURAL; INFILTRATION; INTRACAUDAL; PERINEURAL at 08:54

## 2025-05-27 ASSESSMENT — PAIN - FUNCTIONAL ASSESSMENT
PAIN_FUNCTIONAL_ASSESSMENT: 0-10
PAIN_FUNCTIONAL_ASSESSMENT: 0-10
PAIN_FUNCTIONAL_ASSESSMENT: PREVENTS OR INTERFERES WITH ALL ACTIVE AND SOME PASSIVE ACTIVITIES

## 2025-05-27 ASSESSMENT — PAIN DESCRIPTION - DESCRIPTORS: DESCRIPTORS: BURNING;ACHING;STABBING

## 2025-05-27 NOTE — H&P
Pain Pre-Op H&P Note    SUBJECTIVE:  No chief complaint on file.      History of Present Illness:   Cynthia J Cantua is a 65 y.o. female who presents with low back pain.    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 I do use cannabis gummies for anxiety and pain       Past Surgical History:   Procedure Laterality Date    APPENDECTOMY      COLONOSCOPY N/A 2024    COLONOSCOPY POLYPECTOMY HOT SNARE BIOPSY WITH EPI INJECTION AND CLIP APPLICATION X 4 performed by Yinka Crouch MD at [facility] ENDO    COLONOSCOPY N/A 4/10/2025    COLONOSCOPY HOT SNARE POLYPECTOMY performed by Yinka Crouch MD at [facility] ENDO    FINGER TRIGGER RELEASE Right     thumb    HYSTERECTOMY, TOTAL ABDOMINAL (CERVIX REMOVED)      PLANTAR FASCIA SURGERY Right 2024    ENDOSCOPIC PLANTAR FASCIOTOMY performed by Refugio Parker DPM at [facility] OR    SACRAL NERVE STIMULATOR PLACEMENT      peripheral nerve stimulator for pain management-now removed    UPPER GASTROINTESTINAL ENDOSCOPY N/A 2022    EGD BIOPSY performed by Eulalia Fajardo MD at [facility] OR    WISDOM TOOTH EXTRACTION         Family History   Problem Relation Age of Onset    Cancer Mother     Breast Cancer Mother 55    Diabetes Father     High Blood Pressure Father     High Cholesterol Father     Alcohol Abuse Father     Heart Attack Father          heart attack at 90 yrs old    Substance Abuse Father         Alcohol    Hypertension Father     Cancer Brother         prostate    Alcohol Abuse Brother     Arthritis Brother     High

## 2025-05-27 NOTE — DISCHARGE INSTRUCTIONS
You have received a sedative/anesthetic therefore you should not consume any alcoholic beverages for 24 hours.  Do not drive or operate machinery for 24 hours.    Do not take a tub bath for 72 hours after procedure (this includes hot tubs).  You may shower, but avoid hot water to injection site.   Avoid strenuous activity TODAY especially if you experience dizziness.   Remove band-aid the next day.    Wash off any residual iodine 24 hours from today.   Do not use heat, heating pad, or any other heating device over the injection site for 3 days after the procedure.    If you experience pain after your procedure, you may continue with your current pain medication as prescribed.  (DO NOT INCREASE YOUR PAIN MEDICATION WITHOUT TALKING TO DOCTOR)  Soreness and pain at injection site is common, may use ice to reduce soreness.    Please complete pain diary as instructed. Office staff will contact you to review results.    Call [facility] at [phone] if you experience:   Fever, chills or temperature over 100    Vomiting, headache, persistent stiff neck, nausea or blurred vision   Difficulty urinating or unable to urinate within 8 hours   Increase in weakness, numbness or loss of function of limbs  Increased redness, swelling or drainage at the injection site

## 2025-06-03 ENCOUNTER — TELEPHONE (OUTPATIENT)
Dept: PAIN MANAGEMENT | Age: 65
End: 2025-06-03

## 2025-06-03 DIAGNOSIS — G89.29 CHRONIC BILATERAL LOW BACK PAIN, UNSPECIFIED WHETHER SCIATICA PRESENT: ICD-10-CM

## 2025-06-03 DIAGNOSIS — M54.50 CHRONIC BILATERAL LOW BACK PAIN, UNSPECIFIED WHETHER SCIATICA PRESENT: ICD-10-CM

## 2025-06-03 DIAGNOSIS — M47.817 LUMBOSACRAL SPONDYLOSIS WITHOUT MYELOPATHY: Primary | ICD-10-CM

## 2025-06-03 NOTE — TELEPHONE ENCOUNTER
S/P: Bilateral lumbar L3, L4, L5 medial branch block     DOS: 05/27/2025    Pain  before procedure with activity : 7    Pain after procedure with activity: 0    Activities following procedure include:  made dinner, did dishes    % of pain relief: 100%    Procedure successful: Yes    OV or OR scheduled: OR

## 2025-06-16 ENCOUNTER — HOSPITAL ENCOUNTER (OUTPATIENT)
Dept: NON INVASIVE DIAGNOSTICS | Age: 65
Discharge: HOME | End: 2025-06-16
Payer: MEDICARE

## 2025-06-16 ENCOUNTER — HOSPITAL ENCOUNTER (OUTPATIENT)
Dept: GENERAL RADIOLOGY | Age: 65
Discharge: HOME | End: 2025-06-18
Payer: MEDICARE

## 2025-06-16 ENCOUNTER — HOSPITAL ENCOUNTER (OUTPATIENT)
Age: 65
Discharge: HOME | End: 2025-06-16
Payer: MEDICARE

## 2025-06-16 ENCOUNTER — HOSPITAL ENCOUNTER (OUTPATIENT)
Age: 65
Setting detail: SPECIMEN
Discharge: HOME | End: 2025-06-16
Payer: MEDICARE

## 2025-06-16 DIAGNOSIS — R07.89 CHEST DISCOMFORT: ICD-10-CM

## 2025-06-16 DIAGNOSIS — E78.2 MIXED HYPERLIPIDEMIA: ICD-10-CM

## 2025-06-16 DIAGNOSIS — M54.2 CERVICAL PAIN: ICD-10-CM

## 2025-06-16 DIAGNOSIS — E66.811 CLASS 1 OBESITY DUE TO EXCESS CALORIES WITH SERIOUS COMORBIDITY AND BODY MASS INDEX (BMI) OF 30.0 TO 30.9 IN ADULT: ICD-10-CM

## 2025-06-16 DIAGNOSIS — N18.30 BENIGN HYPERTENSION WITH CKD (CHRONIC KIDNEY DISEASE) STAGE III (HCC): ICD-10-CM

## 2025-06-16 DIAGNOSIS — R00.2 HEART PALPITATIONS: ICD-10-CM

## 2025-06-16 DIAGNOSIS — F33.1 MODERATE EPISODE OF RECURRENT MAJOR DEPRESSIVE DISORDER (HCC): ICD-10-CM

## 2025-06-16 DIAGNOSIS — I12.9 BENIGN HYPERTENSION WITH CKD (CHRONIC KIDNEY DISEASE) STAGE III (HCC): ICD-10-CM

## 2025-06-16 DIAGNOSIS — N18.31 STAGE 3A CHRONIC KIDNEY DISEASE (HCC): ICD-10-CM

## 2025-06-16 DIAGNOSIS — R73.03 PREDIABETES: ICD-10-CM

## 2025-06-16 DIAGNOSIS — F41.9 ANXIETY: ICD-10-CM

## 2025-06-16 DIAGNOSIS — E83.39 HYPOPHOSPHATEMIA: ICD-10-CM

## 2025-06-16 DIAGNOSIS — E66.09 CLASS 1 OBESITY DUE TO EXCESS CALORIES WITH SERIOUS COMORBIDITY AND BODY MASS INDEX (BMI) OF 30.0 TO 30.9 IN ADULT: ICD-10-CM

## 2025-06-16 DIAGNOSIS — I10 ESSENTIAL HYPERTENSION: ICD-10-CM

## 2025-06-16 LAB
ALBUMIN SERPL-MCNC: 4.8 G/DL (ref 3.5–5.2)
ALP SERPL-CCNC: 48 U/L (ref 35–104)
ALT SERPL-CCNC: 25 U/L (ref 10–35)
ANION GAP SERPL CALCULATED.3IONS-SCNC: 12 MMOL/L (ref 9–16)
AST SERPL-CCNC: 19 U/L (ref 10–35)
BILIRUB SERPL-MCNC: <0.2 MG/DL (ref 0–1.2)
BUN SERPL-MCNC: 20 MG/DL (ref 8–23)
CALCIUM SERPL-MCNC: 10.4 MG/DL (ref 8.6–10.4)
CHLORIDE SERPL-SCNC: 107 MMOL/L (ref 98–107)
CHOLEST SERPL-MCNC: 175 MG/DL (ref 0–199)
CHOLESTEROL/HDL RATIO: 3
CO2 SERPL-SCNC: 24 MMOL/L (ref 20–31)
CREAT SERPL-MCNC: 1 MG/DL (ref 0.7–1.2)
ERYTHROCYTE [DISTWIDTH] IN BLOOD BY AUTOMATED COUNT: 12.5 % (ref 11.5–14.9)
EST. AVERAGE GLUCOSE BLD GHB EST-MCNC: 120 MG/DL
GFR, ESTIMATED: 62 ML/MIN/1.73M2
GLUCOSE SERPL-MCNC: 108 MG/DL (ref 74–99)
HBA1C MFR BLD: 5.8 % (ref 4–6)
HCT VFR BLD AUTO: 39.3 % (ref 36–46)
HDLC SERPL-MCNC: 58 MG/DL
HGB BLD-MCNC: 13.4 G/DL (ref 12–16)
LDLC SERPL CALC-MCNC: 90 MG/DL (ref 0–100)
MCH RBC QN AUTO: 29.6 PG (ref 26–34)
MCHC RBC AUTO-ENTMCNC: 34.1 G/DL (ref 31–37)
MCV RBC AUTO: 86.8 FL (ref 80–100)
NRBC BLD-RTO: 0 PER 100 WBC
PHOSPHATE SERPL-MCNC: 3.2 MG/DL (ref 2.5–4.5)
PLATELET # BLD AUTO: 375 K/UL (ref 150–450)
PMV BLD AUTO: 9.6 FL (ref 8–13.5)
POTASSIUM SERPL-SCNC: 4.3 MMOL/L (ref 3.7–5.3)
PROT SERPL-MCNC: 7.3 G/DL (ref 6.6–8.7)
RBC # BLD AUTO: 4.53 M/UL (ref 3.95–5.11)
SODIUM SERPL-SCNC: 143 MMOL/L (ref 136–145)
TRIGL SERPL-MCNC: 133 MG/DL (ref 0–149)
TROPONIN I SERPL HS-MCNC: 7 NG/L (ref 0–14)
TSH SERPL DL<=0.05 MIU/L-ACNC: 1 UIU/ML (ref 0.27–4.2)
WBC OTHER # BLD: 9.7 K/UL (ref 3.5–11)

## 2025-06-16 PROCEDURE — 84484 ASSAY OF TROPONIN QUANT: CPT

## 2025-06-16 PROCEDURE — 84443 ASSAY THYROID STIM HORMONE: CPT

## 2025-06-16 PROCEDURE — 83036 HEMOGLOBIN GLYCOSYLATED A1C: CPT

## 2025-06-16 PROCEDURE — 80053 COMPREHEN METABOLIC PANEL: CPT

## 2025-06-16 PROCEDURE — 84100 ASSAY OF PHOSPHORUS: CPT

## 2025-06-16 PROCEDURE — 80061 LIPID PANEL: CPT

## 2025-06-16 PROCEDURE — 85027 COMPLETE CBC AUTOMATED: CPT

## 2025-06-16 PROCEDURE — 36415 COLL VENOUS BLD VENIPUNCTURE: CPT

## 2025-06-16 PROCEDURE — 72050 X-RAY EXAM NECK SPINE 4/5VWS: CPT

## 2025-06-16 PROCEDURE — 93005 ELECTROCARDIOGRAM TRACING: CPT

## 2025-06-17 LAB
EKG ATRIAL RATE: 84 BPM
EKG P AXIS: 65 DEGREES
EKG P-R INTERVAL: 162 MS
EKG Q-T INTERVAL: 362 MS
EKG QRS DURATION: 92 MS
EKG QTC CALCULATION (BAZETT): 427 MS
EKG R AXIS: 23 DEGREES
EKG T AXIS: 56 DEGREES
EKG VENTRICULAR RATE: 84 BPM

## 2025-06-17 PROCEDURE — 93010 ELECTROCARDIOGRAM REPORT: CPT | Performed by: INTERNAL MEDICINE

## 2025-06-23 ENCOUNTER — HOSPITAL ENCOUNTER (OUTPATIENT)
Dept: ENDOSCOPY | Age: 65
Setting detail: OUTPATIENT SURGERY
Discharge: HOME | End: 2025-06-23
Payer: MEDICARE

## 2025-06-23 VITALS
DIASTOLIC BLOOD PRESSURE: 81 MMHG | RESPIRATION RATE: 22 BRPM | HEART RATE: 84 BPM | OXYGEN SATURATION: 96 % | SYSTOLIC BLOOD PRESSURE: 162 MMHG

## 2025-06-23 PROCEDURE — 3609015500 HC GASTRIC/DUODENAL MOTILITY &/OR MANOMETRY STUDY

## 2025-07-01 ENCOUNTER — HOSPITAL ENCOUNTER (OUTPATIENT)
Dept: PAIN MANAGEMENT | Facility: CLINIC | Age: 65
Discharge: HOME | End: 2025-07-01
Payer: MEDICARE

## 2025-07-01 VITALS
TEMPERATURE: 97.9 F | HEART RATE: 71 BPM | WEIGHT: 177 LBS | HEIGHT: 64 IN | BODY MASS INDEX: 30.22 KG/M2 | SYSTOLIC BLOOD PRESSURE: 105 MMHG | DIASTOLIC BLOOD PRESSURE: 60 MMHG | OXYGEN SATURATION: 94 % | RESPIRATION RATE: 12 BRPM

## 2025-07-01 DIAGNOSIS — R52 PAIN MANAGEMENT: ICD-10-CM

## 2025-07-01 PROCEDURE — 64636 DESTROY L/S FACET JNT ADDL: CPT

## 2025-07-01 PROCEDURE — 64636 DESTROY L/S FACET JNT ADDL: CPT | Performed by: STUDENT IN AN ORGANIZED HEALTH CARE EDUCATION/TRAINING PROGRAM

## 2025-07-01 PROCEDURE — 6360000002 HC RX W HCPCS: Performed by: STUDENT IN AN ORGANIZED HEALTH CARE EDUCATION/TRAINING PROGRAM

## 2025-07-01 PROCEDURE — 64635 DESTROY LUMB/SAC FACET JNT: CPT | Performed by: STUDENT IN AN ORGANIZED HEALTH CARE EDUCATION/TRAINING PROGRAM

## 2025-07-01 PROCEDURE — 64635 DESTROY LUMB/SAC FACET JNT: CPT

## 2025-07-01 PROCEDURE — 99152 MOD SED SAME PHYS/QHP 5/>YRS: CPT | Performed by: STUDENT IN AN ORGANIZED HEALTH CARE EDUCATION/TRAINING PROGRAM

## 2025-07-01 RX ORDER — TRIAMCINOLONE ACETONIDE 40 MG/ML
INJECTION, SUSPENSION INTRA-ARTICULAR; INTRAMUSCULAR
Status: COMPLETED | OUTPATIENT
Start: 2025-07-01 | End: 2025-07-01

## 2025-07-01 RX ORDER — LIDOCAINE HYDROCHLORIDE 20 MG/ML
INJECTION, SOLUTION EPIDURAL; INFILTRATION; INTRACAUDAL; PERINEURAL
Status: COMPLETED | OUTPATIENT
Start: 2025-07-01 | End: 2025-07-01

## 2025-07-01 RX ORDER — MIDAZOLAM HYDROCHLORIDE 2 MG/2ML
INJECTION, SOLUTION INTRAMUSCULAR; INTRAVENOUS
Status: COMPLETED | OUTPATIENT
Start: 2025-07-01 | End: 2025-07-01

## 2025-07-01 RX ORDER — LIDOCAINE HYDROCHLORIDE 10 MG/ML
INJECTION, SOLUTION EPIDURAL; INFILTRATION; INTRACAUDAL; PERINEURAL
Status: COMPLETED | OUTPATIENT
Start: 2025-07-01 | End: 2025-07-01

## 2025-07-01 RX ORDER — FENTANYL CITRATE 50 UG/ML
INJECTION, SOLUTION INTRAMUSCULAR; INTRAVENOUS
Status: COMPLETED | OUTPATIENT
Start: 2025-07-01 | End: 2025-07-01

## 2025-07-01 RX ADMIN — MIDAZOLAM HYDROCHLORIDE 2 MG: 1 INJECTION, SOLUTION INTRAMUSCULAR; INTRAVENOUS at 08:26

## 2025-07-01 RX ADMIN — LIDOCAINE HYDROCHLORIDE 2 ML: 10 INJECTION, SOLUTION EPIDURAL; INFILTRATION; INTRACAUDAL; PERINEURAL at 08:33

## 2025-07-01 RX ADMIN — TRIAMCINOLONE ACETONIDE 40 MG: 40 INJECTION, SUSPENSION INTRA-ARTICULAR; INTRAMUSCULAR at 08:33

## 2025-07-01 RX ADMIN — FENTANYL CITRATE 50 MCG: 50 INJECTION, SOLUTION INTRAMUSCULAR; INTRAVENOUS at 08:26

## 2025-07-01 RX ADMIN — LIDOCAINE HYDROCHLORIDE 5 ML: 20 INJECTION, SOLUTION EPIDURAL; INFILTRATION; INTRACAUDAL; PERINEURAL at 08:30

## 2025-07-01 ASSESSMENT — PAIN DESCRIPTION - LOCATION: LOCATION: BACK

## 2025-07-01 ASSESSMENT — PAIN DESCRIPTION - ONSET: ONSET: PROGRESSIVE

## 2025-07-01 ASSESSMENT — PAIN DESCRIPTION - DESCRIPTORS: DESCRIPTORS: ACHING

## 2025-07-01 ASSESSMENT — PAIN DESCRIPTION - ORIENTATION: ORIENTATION: RIGHT

## 2025-07-01 ASSESSMENT — PAIN - FUNCTIONAL ASSESSMENT: PAIN_FUNCTIONAL_ASSESSMENT: ACTIVITIES ARE NOT PREVENTED

## 2025-07-01 ASSESSMENT — PAIN DESCRIPTION - PAIN TYPE: TYPE: CHRONIC PAIN

## 2025-07-01 ASSESSMENT — PAIN SCALES - GENERAL: PAINLEVEL_OUTOF10: 0

## 2025-07-01 ASSESSMENT — PAIN DESCRIPTION - FREQUENCY: FREQUENCY: INTERMITTENT

## 2025-07-01 NOTE — DISCHARGE INSTRUCTIONS

## 2025-07-01 NOTE — OP NOTE
PROCEDURE PERFORMED: Right Lumbar Medial Branch Radiofrequency Ablation using Fluoroscopy    PREOPERATIVE DIAGNOSIS: Lumbosacral spondylosis    INDICATIONS: Chronic low back pain    The patient's history and physical exam were reviewed.  The risk, benefits, and alternatives of the procedure were discussed and all questions were answered to the patient's satisfaction.  The patient agreed to proceed and written informed consent was obtained.    POSTOPERATIVE DIAGNOSIS: Lumbosacral spondylosis    PHYSICIAN:  Dr. Apolinar Carreno DO    ANESTHESIA:  LOCAL    ASSISTANT:  NONE    PATHOLOGY:  NONE    ESTIMATED BLOOD LOSS:  N/A    IMPLANTS:  NONE    PROCEDURE DESCRIPTION: Right lumbar medial branch radiofrequency ablation using fluoroscopy    The patient was placed on the operative bed in prone position.  The area was prepped with  Chlorhexidine.  The area was then draped in a sterile fashion.    Targeted levels: Right lumbar L3, L4, L5 medial branch radiofrequency ablation    An AP  fluoroscopy image was used to identify and sonu Montejo's point at the L3, L4 levels on the targeted side.  Additionally, the junction of the SAP and sacral ala was also marked on the same side.  The skin and subcutaneous tissues were then anesthetized with 1% lidocaine. At each lumbar medial branch, a 20-gauge, 100 mm curved with 10 mm active tip probe was advanced under AP fluoroscopic projections until bone was contacted along the medial aspect of the transverse process.  Aspiration of each needle was negative for blood, CSF and paresthesia prior to injection.   Motor stimulation at 2 Hz and 1.2 V was negative.  Then, after negative aspiration, 1 mL lidocaine 2% was injected at each level prior to lesioning which was performed at 80°C for 90 seconds.  Once the lesion was complete, injectate solution containing Kenalog 40 mg and 2 mL lidocaine 1% was injected at each site with a total of 1 mL.  The probes were then removed.  The needle

## 2025-07-01 NOTE — H&P
Pain Pre-Op H&P Note    SUBJECTIVE:  No chief complaint on file.      History of Present Illness:   Cynthia J Cantua is a 65 y.o. female who presents with low back pain.    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 I do use cannabis gummies for anxiety and pain       Past Surgical History:   Procedure Laterality Date    ABDOMEN SURGERY  1997 and 1970's    Hysterectomy [facility] & appendectomy    APPENDECTOMY      BREAST BIOPSY      Years ago at [facility]    BREAST LUMPECTOMY      Years ago at [facility]    COLONOSCOPY N/A 09/25/2024    COLONOSCOPY POLYPECTOMY HOT SNARE BIOPSY WITH EPI INJECTION AND CLIP APPLICATION X 4 performed by Yinka Crouch MD at [facility] ENDO    COLONOSCOPY N/A 04/10/2025    COLONOSCOPY HOT SNARE POLYPECTOMY performed by Yinka Crouch MD at [facility] ENDO    FINGER TRIGGER RELEASE Right     thumb    HAND SURGERY  2022? Trigger thumb    At [facility]    HYSTERECTOMY, TOTAL ABDOMINAL (CERVIX REMOVED)      PARTIAL HYSTERECTOMY (CERVIX NOT REMOVED)      PLANTAR FASCIA SURGERY Right 12/04/2024    ENDOSCOPIC PLANTAR FASCIOTOMY performed by Refugio Parker DPM at [facility] OR    SACRAL NERVE STIMULATOR PLACEMENT      peripheral nerve stimulator for pain management-now removed    UPPER GASTROINTESTINAL ENDOSCOPY N/A 08/09/2022    EGD BIOPSY performed by Eulalia Fajardo MD at [facility] OR    WISDOM TOOTH EXTRACTION         Family History   Problem Relation Age of Onset    Cancer Mother     Breast Cancer Mother 55

## 2025-07-15 ENCOUNTER — HOSPITAL ENCOUNTER (OUTPATIENT)
Dept: PAIN MANAGEMENT | Facility: CLINIC | Age: 65
Discharge: HOME | End: 2025-07-15
Payer: MEDICARE

## 2025-07-15 VITALS
SYSTOLIC BLOOD PRESSURE: 109 MMHG | DIASTOLIC BLOOD PRESSURE: 67 MMHG | HEART RATE: 64 BPM | BODY MASS INDEX: 30.22 KG/M2 | RESPIRATION RATE: 11 BRPM | TEMPERATURE: 97.5 F | OXYGEN SATURATION: 93 % | HEIGHT: 64 IN | WEIGHT: 177 LBS

## 2025-07-15 DIAGNOSIS — R52 PAIN MANAGEMENT: ICD-10-CM

## 2025-07-15 PROCEDURE — 6360000002 HC RX W HCPCS: Performed by: STUDENT IN AN ORGANIZED HEALTH CARE EDUCATION/TRAINING PROGRAM

## 2025-07-15 PROCEDURE — 64635 DESTROY LUMB/SAC FACET JNT: CPT | Performed by: STUDENT IN AN ORGANIZED HEALTH CARE EDUCATION/TRAINING PROGRAM

## 2025-07-15 PROCEDURE — 64635 DESTROY LUMB/SAC FACET JNT: CPT

## 2025-07-15 PROCEDURE — 99152 MOD SED SAME PHYS/QHP 5/>YRS: CPT | Performed by: STUDENT IN AN ORGANIZED HEALTH CARE EDUCATION/TRAINING PROGRAM

## 2025-07-15 PROCEDURE — 64636 DESTROY L/S FACET JNT ADDL: CPT | Performed by: STUDENT IN AN ORGANIZED HEALTH CARE EDUCATION/TRAINING PROGRAM

## 2025-07-15 PROCEDURE — 64636 DESTROY L/S FACET JNT ADDL: CPT

## 2025-07-15 RX ORDER — LIDOCAINE HYDROCHLORIDE 10 MG/ML
INJECTION, SOLUTION EPIDURAL; INFILTRATION; INTRACAUDAL; PERINEURAL
Status: COMPLETED | OUTPATIENT
Start: 2025-07-15 | End: 2025-07-15

## 2025-07-15 RX ORDER — FENTANYL CITRATE 50 UG/ML
INJECTION, SOLUTION INTRAMUSCULAR; INTRAVENOUS
Status: COMPLETED | OUTPATIENT
Start: 2025-07-15 | End: 2025-07-15

## 2025-07-15 RX ORDER — MIDAZOLAM HYDROCHLORIDE 2 MG/2ML
INJECTION, SOLUTION INTRAMUSCULAR; INTRAVENOUS
Status: COMPLETED | OUTPATIENT
Start: 2025-07-15 | End: 2025-07-15

## 2025-07-15 RX ORDER — TRIAMCINOLONE ACETONIDE 40 MG/ML
INJECTION, SUSPENSION INTRA-ARTICULAR; INTRAMUSCULAR
Status: COMPLETED | OUTPATIENT
Start: 2025-07-15 | End: 2025-07-15

## 2025-07-15 RX ORDER — LIDOCAINE HYDROCHLORIDE 20 MG/ML
INJECTION, SOLUTION EPIDURAL; INFILTRATION; INTRACAUDAL; PERINEURAL
Status: COMPLETED | OUTPATIENT
Start: 2025-07-15 | End: 2025-07-15

## 2025-07-15 RX ADMIN — LIDOCAINE HYDROCHLORIDE 5 ML: 20 INJECTION, SOLUTION EPIDURAL; INFILTRATION; INTRACAUDAL; PERINEURAL at 08:37

## 2025-07-15 RX ADMIN — TRIAMCINOLONE ACETONIDE 40 MG: 40 INJECTION, SUSPENSION INTRA-ARTICULAR; INTRAMUSCULAR at 08:39

## 2025-07-15 RX ADMIN — FENTANYL CITRATE 50 MCG: 50 INJECTION, SOLUTION INTRAMUSCULAR; INTRAVENOUS at 08:34

## 2025-07-15 RX ADMIN — MIDAZOLAM HYDROCHLORIDE 2 MG: 1 INJECTION, SOLUTION INTRAMUSCULAR; INTRAVENOUS at 08:33

## 2025-07-15 RX ADMIN — LIDOCAINE HYDROCHLORIDE 2 ML: 10 INJECTION, SOLUTION EPIDURAL; INFILTRATION; INTRACAUDAL; PERINEURAL at 08:39

## 2025-07-15 ASSESSMENT — PAIN DESCRIPTION - DESCRIPTORS: DESCRIPTORS: ACHING;SHARP

## 2025-07-15 ASSESSMENT — PAIN SCALES - GENERAL: PAINLEVEL_OUTOF10: 0

## 2025-07-15 ASSESSMENT — PAIN - FUNCTIONAL ASSESSMENT
PAIN_FUNCTIONAL_ASSESSMENT: 0-10
PAIN_FUNCTIONAL_ASSESSMENT: PREVENTS OR INTERFERES WITH MANY ACTIVE NOT PASSIVE ACTIVITIES

## 2025-07-15 NOTE — OP NOTE
PROCEDURE PERFORMED: Left Lumbar Medial Branch Radiofrequency Ablation using Fluoroscopy    PREOPERATIVE DIAGNOSIS: Lumbosacral spondylosis    INDICATIONS: Chronic low back pain    The patient's history and physical exam were reviewed.  The risk, benefits, and alternatives of the procedure were discussed and all questions were answered to the patient's satisfaction.  The patient agreed to proceed and written informed consent was obtained.    POSTOPERATIVE DIAGNOSIS: Lumbosacral spondylosis    PHYSICIAN:  Dr. Apolinar Carreno DO    ANESTHESIA:  LOCAL    ASSISTANT:  NONE    PATHOLOGY:  NONE    ESTIMATED BLOOD LOSS:  N/A    IMPLANTS:  NONE    PROCEDURE DESCRIPTION: Left lumbar medial branch radiofrequency ablation using fluoroscopy    The patient was placed on the operative bed in prone position.  The area was prepped with  Chlorhexidine.  The area was then draped in a sterile fashion.    Targeted levels: Left lumbar L3, L4, L5 medial branch radiofrequency ablation    An AP  fluoroscopy image was used to identify and sonu Montejo's point at the L3, L4 levels on the targeted side.  Additionally, the junction of the SAP and sacral ala was also marked on the same side.  The skin and subcutaneous tissues were then anesthetized with 1% lidocaine. At each lumbar medial branch, a 20-gauge, 100 mm curved with 10 mm active tip probe was advanced under AP fluoroscopic projections until bone was contacted along the medial aspect of the transverse process.  Aspiration of each needle was negative for blood, CSF and paresthesia prior to injection.   Motor stimulation at 2 Hz and 1.2 V was negative.  Then, after negative aspiration, 1 mL lidocaine 2% was injected at each level prior to lesioning which was performed at 80°C for 90 seconds.  Once the lesion was complete, injectate solution containing Kenalog 40 mg and 2 mL lidocaine 1% was injected at each site with a total of 1 mL.  The probes were then removed.  The needle sites

## 2025-07-15 NOTE — DISCHARGE INSTRUCTIONS

## 2025-07-15 NOTE — H&P
Diabetes Father     High Blood Pressure Father     High Cholesterol Father     Alcohol Abuse Father     Heart Attack Father          heart attack at 90 yrs old    Substance Abuse Father         Alcohol    Hypertension Father     Heart Disease Father     Cancer Brother         prostate    Alcohol Abuse Brother     Arthritis Brother     High Blood Pressure Brother     High Cholesterol Brother     Substance Abuse Brother         Alcohol    Breast Cancer Brother     Alcohol Abuse Brother     Arthritis Brother     Breast Cancer Brother         Prostate cancer    High Blood Pressure Brother     High Cholesterol Brother     Substance Abuse Brother         Alcohol       Social History     Socioeconomic History    Marital status:      Spouse name: Not on file    Number of children: Not on file    Years of education: Not on file    Highest education level: Not on file   Occupational History    Not on file   Tobacco Use    Smoking status: Former     Current packs/day: 0.00     Average packs/day: 1.1 packs/day for 40.0 years (45.0 ttl pk-yrs)     Types: Cigarettes     Start date: 1992     Quit date: 2022     Years since quittin.9    Smokeless tobacco: Never    Tobacco comments:     I quit smoking 2022   Vaping Use    Vaping status: Never Used   Substance and Sexual Activity    Alcohol use: Not Currently     Comment: I quit drinking alcohol 5+yrs ago when I quit opiods    Drug use: Yes     Frequency: 4.0 times per week     Types: Marijuana (Weed)     Comment: Gummies    Sexual activity: Not Currently     Partners: Male   Other Topics Concern    Not on file   Social History Narrative    Not on file     Social Drivers of Health     Financial Resource Strain: Low Risk  (2024)    Overall Financial Resource Strain (CARDIA)     Difficulty of Paying Living Expenses: Not hard at all   Food Insecurity: No Food Insecurity (2025)    Hunger Vital Sign     Worried About Running Out of Food in the

## 2025-07-16 ENCOUNTER — HOSPITAL ENCOUNTER (OUTPATIENT)
Dept: PREADMISSION TESTING | Age: 65
Discharge: HOME | End: 2025-07-20

## 2025-07-16 VITALS — HEIGHT: 64 IN | WEIGHT: 175 LBS | BODY MASS INDEX: 29.88 KG/M2

## 2025-07-16 NOTE — PROGRESS NOTES
Pre-op Instructions For Out-Patient Endoscopy Surgery    Medication Instructions:  Please stop herbs and any supplements now (includes vitamins and minerals).    For these medications:  Dulaglutide (Trulicity), Exenatide (Byetta and Bydureon, Liraglutide (Victoza), Lixisenatide (Adlyxin), Semaglutide (Ozempic and Rybelsus), Tirzepatide (Mounjaro, Zepbound,Wegovy)- Stop 1 week prior if taking weekly or 1 day prior if taking every 12 hours or daily.     Please contact your surgeon and prescribing physician for pre-op instructions for any blood thinners. STOP ASPIRIN AS DIRECTED.     If you have inhalers/aerosol treatments at home, please use them the morning of your surgery and bring the inhalers with you to the hospital.    Please take the following medications the morning of your surgery with a sip of water:    WELLBUTRIN, VALTREX, METOPROLOL, AMLODIPINE, HYDRALAZINE    Surgery Instructions:  After midnight before surgery:  Do not eat or drink anything, including water, mints, gum, and hard candy.  You may brush your teeth without swallowing.  No smoking, chewing tobacco, or street drugs.    Please shower or bathe before surgery.       Please do not wear any cologne, lotion, powder, jewelry, piercings, perfume, makeup, nail polish, hair accessories, or hair spray on the day of surgery.  Wear loose comfortable clothing.    Leave your valuables at home but bring a payment source for any after-surgery prescriptions you plan to fill at [facility].  Bring a storage case for any glasses/contacts.    An adult who is responsible for you MUST remain in the hospital and drive you home and should be with you for the first 24 hours after surgery.     The Day of Surgery: 7/23/25 @ 7:45 AM    Arrive at [facility] Surgery Entrance at 5:45 AM and check in at the desk.     If you have a living will or healthcare power of , please bring a copy.    You will be taken to the pre-op holding area where

## 2025-07-21 NOTE — PRE-PROCEDURE INSTRUCTIONS
No answer, left message ?                             Unable to leave message ?    When were you told to arrive at hospital ?  1845    Do you have a  ?y    Are you on any blood thinners ?   Asp stopped x 1 month                  If yes when did you stop taking ?    Do you have your prep Rx filled and instruction ?      Nothing to eat the day before , only clear liquids.    Are you experiencing any covid symptoms ? n    Do you have any infections or rash we should be aware of ?      Do you have the Hibiclens soap to use the night before and the morning of surgery ?    Nothing to eat or drink after midnight, only a sip of water to take any medication instructed to take the night before.y  Wear comfortable clothing, leave any valuables at home, remove any jewelry and body piercing . y

## 2025-07-22 ENCOUNTER — ANESTHESIA EVENT (OUTPATIENT)
Dept: ENDOSCOPY | Age: 65
End: 2025-07-22
Payer: MEDICARE

## 2025-07-23 ENCOUNTER — ANESTHESIA (OUTPATIENT)
Dept: ENDOSCOPY | Age: 65
End: 2025-07-23
Payer: MEDICARE

## 2025-07-23 ENCOUNTER — HOSPITAL ENCOUNTER (OUTPATIENT)
Age: 65
Setting detail: OUTPATIENT SURGERY
Discharge: HOME | End: 2025-07-23
Attending: INTERNAL MEDICINE | Admitting: INTERNAL MEDICINE
Payer: MEDICARE

## 2025-07-23 VITALS
OXYGEN SATURATION: 99 % | TEMPERATURE: 96.8 F | DIASTOLIC BLOOD PRESSURE: 66 MMHG | RESPIRATION RATE: 12 BRPM | HEART RATE: 66 BPM | SYSTOLIC BLOOD PRESSURE: 116 MMHG

## 2025-07-23 DIAGNOSIS — K21.9 GERD (GASTROESOPHAGEAL REFLUX DISEASE): ICD-10-CM

## 2025-07-23 DIAGNOSIS — R13.19 ESOPHAGEAL DYSPHAGIA: ICD-10-CM

## 2025-07-23 PROCEDURE — 3700000000 HC ANESTHESIA ATTENDED CARE: Performed by: INTERNAL MEDICINE

## 2025-07-23 PROCEDURE — 6360000002 HC RX W HCPCS: Performed by: NURSE ANESTHETIST, CERTIFIED REGISTERED

## 2025-07-23 PROCEDURE — 88305 TISSUE EXAM BY PATHOLOGIST: CPT

## 2025-07-23 PROCEDURE — 2720000010 HC SURG SUPPLY STERILE: Performed by: INTERNAL MEDICINE

## 2025-07-23 PROCEDURE — 3700000001 HC ADD 15 MINUTES (ANESTHESIA): Performed by: INTERNAL MEDICINE

## 2025-07-23 PROCEDURE — 2580000003 HC RX 258: Performed by: NURSE ANESTHETIST, CERTIFIED REGISTERED

## 2025-07-23 PROCEDURE — 2709999900 HC NON-CHARGEABLE SUPPLY: Performed by: INTERNAL MEDICINE

## 2025-07-23 PROCEDURE — 7100000011 HC PHASE II RECOVERY - ADDTL 15 MIN: Performed by: INTERNAL MEDICINE

## 2025-07-23 PROCEDURE — 7100000010 HC PHASE II RECOVERY - FIRST 15 MIN: Performed by: INTERNAL MEDICINE

## 2025-07-23 PROCEDURE — 3609012400 HC EGD TRANSORAL BIOPSY SINGLE/MULTIPLE: Performed by: INTERNAL MEDICINE

## 2025-07-23 RX ORDER — LIDOCAINE HYDROCHLORIDE 10 MG/ML
1 INJECTION, SOLUTION EPIDURAL; INFILTRATION; INTRACAUDAL; PERINEURAL
Status: DISCONTINUED | OUTPATIENT
Start: 2025-07-23 | End: 2025-07-23 | Stop reason: HOSPADM

## 2025-07-23 RX ORDER — LIDOCAINE HYDROCHLORIDE 10 MG/ML
INJECTION, SOLUTION EPIDURAL; INFILTRATION; INTRACAUDAL; PERINEURAL
Status: DISCONTINUED | OUTPATIENT
Start: 2025-07-23 | End: 2025-07-23 | Stop reason: SDUPTHER

## 2025-07-23 RX ORDER — SODIUM CHLORIDE 0.9 % (FLUSH) 0.9 %
5-40 SYRINGE (ML) INJECTION PRN
Status: DISCONTINUED | OUTPATIENT
Start: 2025-07-23 | End: 2025-07-23 | Stop reason: HOSPADM

## 2025-07-23 RX ORDER — SODIUM CHLORIDE, SODIUM LACTATE, POTASSIUM CHLORIDE, CALCIUM CHLORIDE 600; 310; 30; 20 MG/100ML; MG/100ML; MG/100ML; MG/100ML
INJECTION, SOLUTION INTRAVENOUS CONTINUOUS
Status: DISCONTINUED | OUTPATIENT
Start: 2025-07-23 | End: 2025-07-23 | Stop reason: HOSPADM

## 2025-07-23 RX ORDER — SODIUM CHLORIDE 0.9 % (FLUSH) 0.9 %
5-40 SYRINGE (ML) INJECTION EVERY 12 HOURS SCHEDULED
Status: DISCONTINUED | OUTPATIENT
Start: 2025-07-23 | End: 2025-07-23 | Stop reason: HOSPADM

## 2025-07-23 RX ORDER — PROPOFOL 10 MG/ML
INJECTION, EMULSION INTRAVENOUS
Status: DISCONTINUED | OUTPATIENT
Start: 2025-07-23 | End: 2025-07-23 | Stop reason: SDUPTHER

## 2025-07-23 RX ORDER — SODIUM CHLORIDE 9 MG/ML
INJECTION, SOLUTION INTRAVENOUS PRN
Status: DISCONTINUED | OUTPATIENT
Start: 2025-07-23 | End: 2025-07-23 | Stop reason: HOSPADM

## 2025-07-23 RX ORDER — SODIUM CHLORIDE, SODIUM LACTATE, POTASSIUM CHLORIDE, CALCIUM CHLORIDE 600; 310; 30; 20 MG/100ML; MG/100ML; MG/100ML; MG/100ML
INJECTION, SOLUTION INTRAVENOUS
Status: DISCONTINUED | OUTPATIENT
Start: 2025-07-23 | End: 2025-07-23 | Stop reason: SDUPTHER

## 2025-07-23 RX ADMIN — PROPOFOL 560 MG: 10 INJECTION, EMULSION INTRAVENOUS at 07:52

## 2025-07-23 RX ADMIN — SODIUM CHLORIDE, POTASSIUM CHLORIDE, SODIUM LACTATE AND CALCIUM CHLORIDE: 600; 310; 30; 20 INJECTION, SOLUTION INTRAVENOUS at 07:48

## 2025-07-23 RX ADMIN — LIDOCAINE HYDROCHLORIDE 50 MG: 10 INJECTION, SOLUTION EPIDURAL; INFILTRATION; INTRACAUDAL; PERINEURAL at 07:52

## 2025-07-23 ASSESSMENT — PAIN - FUNCTIONAL ASSESSMENT
PAIN_FUNCTIONAL_ASSESSMENT: 0-10
PAIN_FUNCTIONAL_ASSESSMENT: ADULT NONVERBAL PAIN SCALE (NPVS)

## 2025-07-23 ASSESSMENT — ENCOUNTER SYMPTOMS
SHORTNESS OF BREATH: 0
SORE THROAT: 0
COUGH: 0
BACK PAIN: 1
APNEA: 0
TROUBLE SWALLOWING: 1

## 2025-07-23 NOTE — OP NOTE
PROCEDURE NOTE    DATE OF PROCEDURE: 7/23/2025     SURGEON: Yinka Crouch MD  Facility: \"[facility]  ASSISTANT: None  Anesthesia: mac   PREOPERATIVE DIAGNOSIS:   Dysphagia  Abnormal motility of the esophagus    Diagnosis:    Patient does have a thick distal esophageal stricture biopsies were taken  Dilated with 18-19-20 balloon        POSTOPERATIVE DIAGNOSIS: As described below    OPERATION: Upper GI endoscopy with Biopsy    ANESTHESIA: Moderate Sedation     ESTIMATED BLOOD LOSS: Less than 50 ml    COMPLICATIONS: None.     SPECIMENS:  Was Obtained: As above    HISTORY: The patient is a 65 y.o. year old female with history of above preop diagnosis.  I recommended esophagogastroduodenoscopy with possible biopsy and I explained the risk, benefits, expected outcome, and alternatives to the procedure.  Risks included but are not limited to bleeding, infection, respiratory distress, hypotension, and perforation of the esophagus, stomach, or duodenum.  Patient understands and is in agreement.      The patient was counseled at length about the risks of siva Covid-19 during their perioperative period and any recovery window from their procedure.  The patient was made aware that siva Covid-19  may worsen their prognosis for recovering from their procedure  and lend to a higher morbidity and/or mortality risk.  All material risks, benefits, and reasonable alternatives including postponing the procedure were discussed. The patient does wish to proceed with the procedure at this time.         PROCEDURE: The patient was given IV conscious sedation.  The patient's SPO2 remained above 90% throughout the procedure.The gastroscope was inserted orally and advanced under direct vision through the esophagus, through the stomach, through the pylorus, and into the descending duodenum.      Post sedation note :The patient's SPO2 remained above 90% throughout the procedure.the vital signs remained stable , and no

## 2025-07-23 NOTE — ANESTHESIA PRE PROCEDURE
Department of Anesthesiology  Preprocedure Note       Name:  Cynthia J Cantua   Age:  65 y.o.  :  1960                                          MRN:  [id number]         Date:  2025      Surgeon: Surgeon(s):  Yinka Crouch MD    Procedure: Procedure(s):  ESOPHAGOGASTRODUODENOSCOPY BIOPSY    Medications prior to admission:   Prior to Admission medications    Medication Sig Start Date End Date Taking? Authorizing Provider   valACYclovir (VALTREX) 1 g tablet Take 1 tablet by mouth 2 times daily 25  Yes Mary Jean MD   gabapentin (NEURONTIN) 300 MG capsule Take 1 capsule by mouth 3 times daily for 180 days. Intended supply: 30 days 25 Yes Tamara Muller APRN - CNP   buPROPion (WELLBUTRIN SR) 150 MG extended release tablet TAKE 1 TABLET BY MOUTH 2 TIMES A DAY 25  Yes Tamara Muller APRN - CNP   metoprolol succinate (TOPROL XL) 100 MG extended release tablet TAKE 1 TABLET BY MOUTH DAILY 25  Yes Tamara Muller APRN - CNP   omeprazole (PRILOSEC) 20 MG delayed release capsule TAKE 1 CAPSULE BY MOUTH 2 TIMES A DAY 25  Yes Tamara Muller APRN - CNP   atorvastatin (LIPITOR) 40 MG tablet TAKE 1 TABLET BY MOUTH EVERY NIGHT AT BEDTIME 25  Yes Tamara Muller APRN - CNP   fenofibrate 160 MG tablet TAKE 1 TABLET BY MOUTH DAILY 4/15/25  Yes Tamara Muller APRN - CNP   acetaminophen (TYLENOL) 500 MG tablet Take 2 tablets by mouth every 6 hours as needed for Pain   Yes Derrick Cadet MD   hydrALAZINE (APRESOLINE) 100 MG tablet Take 1 tablet by mouth 3 times daily 25  Yes Brant Maldonado MD   amLODIPine (NORVASC) 10 MG tablet Take 1 tablet by mouth daily 10/21/24  Yes Brant Maldonado MD   Omega-3 Fatty Acids (FISH OIL) 1200 MG CAPS Take by mouth in the morning and at bedtime   Yes Derrick Cadet MD   Multiple Vitamins-Minerals (THERAPEUTIC MULTIVITAMIN-MINERALS) tablet Take 1 tablet by mouth daily women's 50+ advanced   Yes Provider,

## 2025-07-23 NOTE — ANESTHESIA POSTPROCEDURE EVALUATION
Department of Anesthesiology  Postprocedure Note    Patient: Cynthia J Cantua  MRN: [id number]  YOB: 1960  Date of evaluation: 7/23/2025    Procedure Summary       Date: 07/23/25 Room / Location: [place] [id number] / [facility]    Anesthesia Start: 0748 Anesthesia Stop: 0819    Procedure: ESOPHAGOGASTRODUODENOSCOPY BIOPSY WITH BALLOON DILATION (Esophagus) Diagnosis:       GERD (gastroesophageal reflux disease)      Esophageal dysphagia      (GERD (gastroesophageal reflux disease) [K21.9])      (Esophageal dysphagia [R13.19])    Surgeons: Yinka Crouch MD Responsible Provider: Jeferson Conde MD    Anesthesia Type: general, TIVA ASA Status: 2            Anesthesia Type: No value filed.    Zoë Phase I: Zoë Score: 10    Zoë Phase II: Zoë Score: 9    Anesthesia Post Evaluation    Comments: POST- ANESTHESIA EVALUATION       Pt Name: Cynthia J Cantua  MRN: [id number]  YOB: 1960  Date of evaluation: 7/23/2025  Time:  2:41 PM      /66   Pulse 66   Temp 96.8 °F (36 °C) (Infrared)   Resp 12   SpO2 99%      Consciousness Level  Awake  Cardiopulmonary Status  Stable  Pain Adequately Treated YES  Nausea / Vomiting  NO  Adequate Hydration  YES  Anesthesia Related Complications NONE      Electronically signed by Jeferson Conde MD on 7/23/2025 at 2:41 PM           No notable events documented.

## 2025-07-23 NOTE — H&P
HISTORY and PHYSICAL  [facility]       NAME:  Cynthia J Cantua  MRN: [id number]   YOB: 1960   Date: 7/23/2025   Age: 65 y.o.  Gender: female       COMPLAINT AND PRESENT HISTORY:     Cynthia J Cantua is 65 y.o.,  female, presents for ESOPHAGOGASTRODUODENOSCOPY BIOPSY   Primary dx: GERD (gastroesophageal reflux disease) [K21.9]  Esophageal dysphagia [R13.19].    Office note per Mya FRENCH on 5/1/2025    HISTORY OF PRESENT ILLNESS: Ms.Cynthia J Cantua is a 65 y.o. female , referred for evaluation of GERD, HH, dysphagia, constipation, diverticulosis hemorrhoids, hx polyps.     Here for f/u  Underwent colonoscopy 4/10/25 with Dr Crouch showing fair prep, multiple polyps (tubular adenomas), diverticulosis, and hemorrhoids. 2 year recall recommended. She does have history of large polyps in the sigmoid measuring >1cm requiring placement of clips.  Took Miralax prep due to CKD. States she is having constipation at baseline. Tried miralax but could not tolerate the powder so she plans to try the capsules. Never tried fiber supplementation. Took 4 x caps last night and has had a successful BM this morning.     Omeprazole 20mg BID with GERD for many years, started after her EGD 8/9/22. Has not been able to taper down in the past. Also endorse new intermittent dysphagia and odynophagia to both liquids and solids. Not associated with extreme temperatures. Not worsened by stress.      Pruritic rash on bilateral extensor surfaces of elbows - states primary care is aware and has been giving her cream. Chart review shows eczema dx 5/30/24 treated with clobetasol. No known hx psoriasis.     Denies fever/chills, change in appetite, unintentional weight loss, n/v, abd pain, diarrhea, black or bloody stools.         Last EGD:   8/9/22 - [facility]  FINDINGS:   Esophagus: The esophagus was inspected to the Z-line. The endoscopic exam showed hiatal hernia.      Stomach: The stomach was inspected in

## 2025-07-24 LAB — SURGICAL PATHOLOGY REPORT: NORMAL

## 2025-08-06 ENCOUNTER — OFFICE VISIT (OUTPATIENT)
Dept: GASTROENTEROLOGY | Age: 65
End: 2025-08-06
Payer: MEDICARE

## 2025-08-06 ENCOUNTER — TELEPHONE (OUTPATIENT)
Dept: GASTROENTEROLOGY | Age: 65
End: 2025-08-06

## 2025-08-06 VITALS
HEART RATE: 70 BPM | DIASTOLIC BLOOD PRESSURE: 85 MMHG | SYSTOLIC BLOOD PRESSURE: 140 MMHG | BODY MASS INDEX: 30.56 KG/M2 | WEIGHT: 179 LBS | HEIGHT: 64 IN | OXYGEN SATURATION: 94 %

## 2025-08-06 DIAGNOSIS — K57.90 DIVERTICULOSIS: ICD-10-CM

## 2025-08-06 DIAGNOSIS — K64.9 HEMORRHOIDS, UNSPECIFIED HEMORRHOID TYPE: ICD-10-CM

## 2025-08-06 DIAGNOSIS — K22.2 STRICTURE AND STENOSIS OF ESOPHAGUS: ICD-10-CM

## 2025-08-06 DIAGNOSIS — K59.00 CONSTIPATION, UNSPECIFIED CONSTIPATION TYPE: Primary | ICD-10-CM

## 2025-08-06 PROCEDURE — 3079F DIAST BP 80-89 MM HG: CPT | Performed by: PHYSICIAN ASSISTANT

## 2025-08-06 PROCEDURE — G8417 CALC BMI ABV UP PARAM F/U: HCPCS | Performed by: PHYSICIAN ASSISTANT

## 2025-08-06 PROCEDURE — 3017F COLORECTAL CA SCREEN DOC REV: CPT | Performed by: PHYSICIAN ASSISTANT

## 2025-08-06 PROCEDURE — 1126F AMNT PAIN NOTED NONE PRSNT: CPT | Performed by: PHYSICIAN ASSISTANT

## 2025-08-06 PROCEDURE — 1090F PRES/ABSN URINE INCON ASSESS: CPT | Performed by: PHYSICIAN ASSISTANT

## 2025-08-06 PROCEDURE — 3077F SYST BP >= 140 MM HG: CPT | Performed by: PHYSICIAN ASSISTANT

## 2025-08-06 PROCEDURE — G8399 PT W/DXA RESULTS DOCUMENT: HCPCS | Performed by: PHYSICIAN ASSISTANT

## 2025-08-06 PROCEDURE — 1160F RVW MEDS BY RX/DR IN RCRD: CPT | Performed by: PHYSICIAN ASSISTANT

## 2025-08-06 PROCEDURE — 1123F ACP DISCUSS/DSCN MKR DOCD: CPT | Performed by: PHYSICIAN ASSISTANT

## 2025-08-06 PROCEDURE — G8427 DOCREV CUR MEDS BY ELIG CLIN: HCPCS | Performed by: PHYSICIAN ASSISTANT

## 2025-08-06 PROCEDURE — 99214 OFFICE O/P EST MOD 30 MIN: CPT | Performed by: PHYSICIAN ASSISTANT

## 2025-08-06 PROCEDURE — 1036F TOBACCO NON-USER: CPT | Performed by: PHYSICIAN ASSISTANT

## 2025-08-06 PROCEDURE — 1159F MED LIST DOCD IN RCRD: CPT | Performed by: PHYSICIAN ASSISTANT

## 2025-08-06 RX ORDER — POLYETHYLENE GLYCOL 3350 17 G/17G
17 POWDER, FOR SOLUTION ORAL DAILY PRN
Qty: 30 EACH | Refills: 5 | Status: SHIPPED | OUTPATIENT
Start: 2025-08-06

## 2025-08-06 RX ORDER — PSYLLIUM HUSK 0.4 G
2 CAPSULE ORAL DAILY
Qty: 60 CAPSULE | Refills: 5 | Status: SHIPPED | OUTPATIENT
Start: 2025-08-06

## 2025-08-18 ENCOUNTER — HOSPITAL ENCOUNTER (OUTPATIENT)
Dept: PAIN MANAGEMENT | Age: 65
Discharge: HOME | End: 2025-08-18
Payer: MEDICARE

## 2025-08-18 VITALS — WEIGHT: 179 LBS | BODY MASS INDEX: 30.56 KG/M2 | HEIGHT: 64 IN

## 2025-08-18 DIAGNOSIS — G89.29 CHRONIC PAIN OF RIGHT THUMB: ICD-10-CM

## 2025-08-18 DIAGNOSIS — M79.18 CHRONIC GLUTEAL PAIN: Primary | ICD-10-CM

## 2025-08-18 DIAGNOSIS — M47.817 LUMBOSACRAL SPONDYLOSIS WITHOUT MYELOPATHY: ICD-10-CM

## 2025-08-18 DIAGNOSIS — M53.3 SACROILIAC JOINT PAIN: ICD-10-CM

## 2025-08-18 DIAGNOSIS — M79.644 CHRONIC PAIN OF RIGHT THUMB: ICD-10-CM

## 2025-08-18 DIAGNOSIS — G89.29 CHRONIC GLUTEAL PAIN: Primary | ICD-10-CM

## 2025-08-18 PROCEDURE — 99213 OFFICE O/P EST LOW 20 MIN: CPT

## 2025-08-18 PROCEDURE — 99214 OFFICE O/P EST MOD 30 MIN: CPT | Performed by: STUDENT IN AN ORGANIZED HEALTH CARE EDUCATION/TRAINING PROGRAM

## 2025-08-18 ASSESSMENT — PAIN SCALES - GENERAL: PAINLEVEL_OUTOF10: 7

## (undated) DEVICE — FORCEPS BX L240CM JAW DIA2.8MM L CAP W/ NDL MIC MESH TOOTH

## (undated) DEVICE — SPLINT QUICK STEP SCOTCHCAST 5 X 30

## (undated) DEVICE — BITEBLOCK 54FR W/ DENT RIM BLOX

## (undated) DEVICE — PADDING UNDERCAST W4INXL12FT RAYON POLY SYN NONADHESIVE

## (undated) DEVICE — FORCEPS BX L240CM WRK CHN 2.8MM STD CAP W/ NDL MIC MESH

## (undated) DEVICE — TRIANGLE KNIFE BOX OF 6: Brand: ECTRA

## (undated) DEVICE — GOWN,POLY REINFORCED,LG: Brand: MEDLINE

## (undated) DEVICE — GAUZE,SPONGE,4"X4",16PLY,STRL,LF,10/TRAY: Brand: MEDLINE

## (undated) DEVICE — GLOVE SURG SZ 75 L12IN FNGR THK79MIL GRN LTX FREE

## (undated) DEVICE — DEFENDO AIR WATER SUCTION AND BIOPSY VALVE KIT FOR  OLYMPUS: Brand: DEFENDO AIR/WATER/SUCTION AND BIOPSY VALVE

## (undated) DEVICE — RETROGRADE KNIFE BOX OF 6: Brand: ECTRA

## (undated) DEVICE — ERBE NESSY® OMEGA PLATE USA (85+23)CM² , WITH CABLE 3 M: Brand: ERBE

## (undated) DEVICE — 4-PORT MANIFOLD: Brand: NEPTUNE 2

## (undated) DEVICE — ZIMMER® STERILE DISPOSABLE TOURNIQUET CUFF WITH PLC, DUAL PORT, SINGLE BLADDER, 18 IN. (46 CM)

## (undated) DEVICE — ENDOSCOPIC KIT 1.1+ 10 FT OP4 CA DE

## (undated) DEVICE — GLOVE SURG SZ 85 L12IN FNGR ORTHO 126MIL CRM LTX FREE

## (undated) DEVICE — POLYP TRAP: Brand: TRAPEASE®

## (undated) DEVICE — NEEDLE SCLERO 25GA L240CM OD0.51MM ID0.24MM EXTN L4MM SHTH

## (undated) DEVICE — ST CHARLES PODIATRY: Brand: MEDLINE INDUSTRIES, INC.

## (undated) DEVICE — APPLICATOR COTTON TIP STRL 5/PK

## (undated) DEVICE — Device: Brand: DEFENDO VALVE AND CONNECTOR KIT

## (undated) DEVICE — BANDAGE COMPR W6INXL5YD WHT BGE POLY COT M E WRP WV HK AND

## (undated) DEVICE — SNARE ENDOSCP POLYP MED STD AD 2.4X27X240 CM 2.8 MM OVL SENS

## (undated) DEVICE — SNARE ENDOSCP L240CM LOOP W27MM SHTH DIA2.4MM WRK CHN 2.8MM

## (undated) DEVICE — GAUZE,SPONGE,FLUFF,6"X6.75",STRL,5/TRAY: Brand: MEDLINE

## (undated) DEVICE — ENDO KIT W/SYRINGE: Brand: MEDLINE INDUSTRIES, INC.

## (undated) DEVICE — SNARE ENDOSCP AD SM L240CM LOOP W1.3CM SHTH DIA2.4MM POLYP

## (undated) DEVICE — STOCKINETTE,SINGLE PLY,4X48,STERILE: Brand: MEDLINE

## (undated) DEVICE — SINGLE PORT MANIFOLD: Brand: NEPTUNE 2

## (undated) DEVICE — SUTURE PROL SZ 3-0 L18IN NONABSORBABLE BLU L24MM FS-1 3/8 8684G

## (undated) DEVICE — ESOPHAGEAL BALLOON DILATATION CATHETER: Brand: CRE FIXED WIRE

## (undated) DEVICE — SINGLE USE AIR/WATER, SUCTION AND BIOPSY VALVES SET: Brand: ORCAPOD™

## (undated) DEVICE — PERRYSBURG ENDO PACK: Brand: MEDLINE INDUSTRIES, INC.

## (undated) DEVICE — SYRINGE INFL 60ML DISP ALLIANCE II